# Patient Record
Sex: MALE | Race: WHITE | NOT HISPANIC OR LATINO | Employment: OTHER | URBAN - METROPOLITAN AREA
[De-identification: names, ages, dates, MRNs, and addresses within clinical notes are randomized per-mention and may not be internally consistent; named-entity substitution may affect disease eponyms.]

---

## 2018-08-20 ENCOUNTER — APPOINTMENT (OUTPATIENT)
Dept: CARDIOLOGY | Facility: HOSPITAL | Age: 63
End: 2018-08-20
Attending: INTERNAL MEDICINE

## 2018-08-20 ENCOUNTER — APPOINTMENT (OUTPATIENT)
Dept: GENERAL RADIOLOGY | Facility: HOSPITAL | Age: 63
End: 2018-08-20

## 2018-08-20 ENCOUNTER — HOSPITAL ENCOUNTER (INPATIENT)
Facility: HOSPITAL | Age: 63
LOS: 2 days | Discharge: HOME | End: 2018-08-22
Attending: EMERGENCY MEDICINE | Admitting: INTERNAL MEDICINE

## 2018-08-20 DIAGNOSIS — I21.19 ACUTE MI, INFERIOR WALL (HCC): Primary | ICD-10-CM

## 2018-08-20 LAB
ACT BLD: 334 SECONDS (ref 82–152)
ALBUMIN SERPL-MCNC: 4 G/DL (ref 3.5–5.2)
ALBUMIN/GLOB SERPL: 1.8 G/DL
ALP SERPL-CCNC: 58 U/L (ref 39–117)
ALT SERPL W P-5'-P-CCNC: 14 U/L (ref 1–41)
ANION GAP SERPL CALCULATED.3IONS-SCNC: 12.1 MMOL/L
AST SERPL-CCNC: 13 U/L (ref 1–40)
BASOPHILS # BLD AUTO: 0.02 10*3/MM3 (ref 0–0.2)
BASOPHILS NFR BLD AUTO: 0.2 % (ref 0–1.5)
BH CV ECHO MEAS - ACS: 1.9 CM
BH CV ECHO MEAS - AO MAX PG (FULL): 7 MMHG
BH CV ECHO MEAS - AO MAX PG: 15.2 MMHG
BH CV ECHO MEAS - AO MEAN PG (FULL): 4 MMHG
BH CV ECHO MEAS - AO MEAN PG: 8 MMHG
BH CV ECHO MEAS - AO ROOT AREA (BSA CORRECTED): 1.3
BH CV ECHO MEAS - AO ROOT AREA: 5.3 CM^2
BH CV ECHO MEAS - AO ROOT DIAM: 2.6 CM
BH CV ECHO MEAS - AO V2 MAX: 195 CM/SEC
BH CV ECHO MEAS - AO V2 MEAN: 129 CM/SEC
BH CV ECHO MEAS - AO V2 VTI: 42.6 CM
BH CV ECHO MEAS - AVA(I,A): 2.5 CM^2
BH CV ECHO MEAS - AVA(I,D): 2.5 CM^2
BH CV ECHO MEAS - AVA(V,A): 2.5 CM^2
BH CV ECHO MEAS - AVA(V,D): 2.5 CM^2
BH CV ECHO MEAS - BSA(HAYCOCK): 2.1 M^2
BH CV ECHO MEAS - BSA: 2.1 M^2
BH CV ECHO MEAS - BZI_BMI: 25.5 KILOGRAMS/M^2
BH CV ECHO MEAS - BZI_METRIC_HEIGHT: 182.9 CM
BH CV ECHO MEAS - BZI_METRIC_WEIGHT: 85.3 KG
BH CV ECHO MEAS - EDV(CUBED): 79.5 ML
BH CV ECHO MEAS - EDV(MOD-SP2): 76 ML
BH CV ECHO MEAS - EDV(MOD-SP4): 90 ML
BH CV ECHO MEAS - EDV(TEICH): 83.1 ML
BH CV ECHO MEAS - EF(CUBED): 69.3 %
BH CV ECHO MEAS - EF(MOD-BP): 70 %
BH CV ECHO MEAS - EF(MOD-SP2): 67.1 %
BH CV ECHO MEAS - EF(MOD-SP4): 70 %
BH CV ECHO MEAS - EF(TEICH): 61.2 %
BH CV ECHO MEAS - ESV(CUBED): 24.4 ML
BH CV ECHO MEAS - ESV(MOD-SP2): 25 ML
BH CV ECHO MEAS - ESV(MOD-SP4): 27 ML
BH CV ECHO MEAS - ESV(TEICH): 32.2 ML
BH CV ECHO MEAS - FS: 32.6 %
BH CV ECHO MEAS - IVS/LVPW: 1.1
BH CV ECHO MEAS - IVSD: 1.1 CM
BH CV ECHO MEAS - LAT PEAK E' VEL: 8.9 CM/SEC
BH CV ECHO MEAS - LV DIASTOLIC VOL/BSA (35-75): 43.4 ML/M^2
BH CV ECHO MEAS - LV MASS(C)D: 152.6 GRAMS
BH CV ECHO MEAS - LV MASS(C)DI: 73.5 GRAMS/M^2
BH CV ECHO MEAS - LV MAX PG: 8.2 MMHG
BH CV ECHO MEAS - LV MEAN PG: 4 MMHG
BH CV ECHO MEAS - LV SYSTOLIC VOL/BSA (12-30): 13 ML/M^2
BH CV ECHO MEAS - LV V1 MAX: 143 CM/SEC
BH CV ECHO MEAS - LV V1 MEAN: 86.1 CM/SEC
BH CV ECHO MEAS - LV V1 VTI: 30.2 CM
BH CV ECHO MEAS - LVIDD: 4.3 CM
BH CV ECHO MEAS - LVIDS: 2.9 CM
BH CV ECHO MEAS - LVLD AP2: 7.3 CM
BH CV ECHO MEAS - LVLD AP4: 8 CM
BH CV ECHO MEAS - LVLS AP2: 6.1 CM
BH CV ECHO MEAS - LVLS AP4: 6.3 CM
BH CV ECHO MEAS - LVOT AREA (M): 3.5 CM^2
BH CV ECHO MEAS - LVOT AREA: 3.5 CM^2
BH CV ECHO MEAS - LVOT DIAM: 2.1 CM
BH CV ECHO MEAS - LVPWD: 1 CM
BH CV ECHO MEAS - MED PEAK E' VEL: 9.8 CM/SEC
BH CV ECHO MEAS - MV A DUR: 0.18 SEC
BH CV ECHO MEAS - MV A MAX VEL: 102 CM/SEC
BH CV ECHO MEAS - MV DEC SLOPE: 386 CM/SEC^2
BH CV ECHO MEAS - MV DEC TIME: 0.18 SEC
BH CV ECHO MEAS - MV E MAX VEL: 94.6 CM/SEC
BH CV ECHO MEAS - MV E/A: 0.93
BH CV ECHO MEAS - MV MAX PG: 4.8 MMHG
BH CV ECHO MEAS - MV MEAN PG: 1 MMHG
BH CV ECHO MEAS - MV P1/2T MAX VEL: 115 CM/SEC
BH CV ECHO MEAS - MV P1/2T: 87.3 MSEC
BH CV ECHO MEAS - MV V2 MAX: 109 CM/SEC
BH CV ECHO MEAS - MV V2 MEAN: 50.3 CM/SEC
BH CV ECHO MEAS - MV V2 VTI: 41.7 CM
BH CV ECHO MEAS - MVA P1/2T LCG: 1.9 CM^2
BH CV ECHO MEAS - MVA(P1/2T): 2.5 CM^2
BH CV ECHO MEAS - MVA(VTI): 2.5 CM^2
BH CV ECHO MEAS - PA ACC TIME: 0.11 SEC
BH CV ECHO MEAS - PA MAX PG (FULL): 1.7 MMHG
BH CV ECHO MEAS - PA MAX PG: 3 MMHG
BH CV ECHO MEAS - PA PR(ACCEL): 30 MMHG
BH CV ECHO MEAS - PA V2 MAX: 86.9 CM/SEC
BH CV ECHO MEAS - PULM A REVS DUR: 0.16 SEC
BH CV ECHO MEAS - PULM A REVS VEL: 34.6 CM/SEC
BH CV ECHO MEAS - PULM DIAS VEL: 54.1 CM/SEC
BH CV ECHO MEAS - PULM S/D: 1.1
BH CV ECHO MEAS - PULM SYS VEL: 58.5 CM/SEC
BH CV ECHO MEAS - PVA(V,A): 3.2 CM^2
BH CV ECHO MEAS - PVA(V,D): 3.2 CM^2
BH CV ECHO MEAS - QP/QS: 0.75
BH CV ECHO MEAS - RAP SYSTOLE: 3 MMHG
BH CV ECHO MEAS - RV MAX PG: 1.3 MMHG
BH CV ECHO MEAS - RV MEAN PG: 1 MMHG
BH CV ECHO MEAS - RV V1 MAX: 56.9 CM/SEC
BH CV ECHO MEAS - RV V1 MEAN: 39.7 CM/SEC
BH CV ECHO MEAS - RV V1 VTI: 15.9 CM
BH CV ECHO MEAS - RVOT AREA: 4.9 CM^2
BH CV ECHO MEAS - RVOT DIAM: 2.5 CM
BH CV ECHO MEAS - RVSP: 24.3 MMHG
BH CV ECHO MEAS - SI(AO): 109 ML/M^2
BH CV ECHO MEAS - SI(CUBED): 26.6 ML/M^2
BH CV ECHO MEAS - SI(LVOT): 50.4 ML/M^2
BH CV ECHO MEAS - SI(MOD-SP2): 24.6 ML/M^2
BH CV ECHO MEAS - SI(MOD-SP4): 30.4 ML/M^2
BH CV ECHO MEAS - SI(TEICH): 24.5 ML/M^2
BH CV ECHO MEAS - SV(AO): 226.2 ML
BH CV ECHO MEAS - SV(CUBED): 55.1 ML
BH CV ECHO MEAS - SV(LVOT): 104.6 ML
BH CV ECHO MEAS - SV(MOD-SP2): 51 ML
BH CV ECHO MEAS - SV(MOD-SP4): 63 ML
BH CV ECHO MEAS - SV(RVOT): 78 ML
BH CV ECHO MEAS - SV(TEICH): 50.9 ML
BH CV ECHO MEAS - TAPSE (>1.6): 2.6 CM2
BH CV ECHO MEAS - TR MAX VEL: 231 CM/SEC
BH CV ECHO MEASUREMENTS AVERAGE E/E' RATIO: 10.12
BH CV XLRA - RV BASE: 3.8 CM
BH CV XLRA - RV LENGTH: 7 CM
BH CV XLRA - RV MID: 2.8 CM
BH CV XLRA - TDI S': 15.8 CM/SEC
BILIRUB SERPL-MCNC: 0.2 MG/DL (ref 0.1–1.2)
BUN BLD-MCNC: 15 MG/DL (ref 8–23)
BUN/CREAT SERPL: 15.3 (ref 7–25)
CALCIUM SPEC-SCNC: 8.7 MG/DL (ref 8.6–10.5)
CHLORIDE SERPL-SCNC: 105 MMOL/L (ref 98–107)
CO2 SERPL-SCNC: 24.9 MMOL/L (ref 22–29)
CREAT BLD-MCNC: 0.98 MG/DL (ref 0.76–1.27)
DEPRECATED RDW RBC AUTO: 47.7 FL (ref 37–54)
EOSINOPHIL # BLD AUTO: 0.17 10*3/MM3 (ref 0–0.7)
EOSINOPHIL NFR BLD AUTO: 1.7 % (ref 0.3–6.2)
ERYTHROCYTE [DISTWIDTH] IN BLOOD BY AUTOMATED COUNT: 14.1 % (ref 11.5–14.5)
GFR SERPL CREATININE-BSD FRML MDRD: 78 ML/MIN/1.73
GLOBULIN UR ELPH-MCNC: 2.2 GM/DL
GLUCOSE BLD-MCNC: 136 MG/DL (ref 65–99)
GLUCOSE BLDC GLUCOMTR-MCNC: 120 MG/DL (ref 70–130)
HCT VFR BLD AUTO: 49.6 % (ref 40.4–52.2)
HGB BLD-MCNC: 15.9 G/DL (ref 13.7–17.6)
HOLD SPECIMEN: NORMAL
HOLD SPECIMEN: NORMAL
IMM GRANULOCYTES # BLD: 0.03 10*3/MM3 (ref 0–0.03)
IMM GRANULOCYTES NFR BLD: 0.3 % (ref 0–0.5)
INR PPP: 1.05 (ref 0.9–1.1)
LEFT ATRIUM VOLUME INDEX: 31 ML/M2
LV EF 2D ECHO EST: 70 %
LYMPHOCYTES # BLD AUTO: 5.11 10*3/MM3 (ref 0.9–4.8)
LYMPHOCYTES NFR BLD AUTO: 49.9 % (ref 19.6–45.3)
MAGNESIUM SERPL-MCNC: 2.1 MG/DL (ref 1.6–2.4)
MAGNESIUM SERPL-MCNC: 2.2 MG/DL (ref 1.6–2.4)
MAXIMAL PREDICTED HEART RATE: 159 BPM
MCH RBC QN AUTO: 29.9 PG (ref 27–32.7)
MCHC RBC AUTO-ENTMCNC: 32.1 G/DL (ref 32.6–36.4)
MCV RBC AUTO: 93.4 FL (ref 79.8–96.2)
MONOCYTES # BLD AUTO: 0.71 10*3/MM3 (ref 0.2–1.2)
MONOCYTES NFR BLD AUTO: 6.9 % (ref 5–12)
NEUTROPHILS # BLD AUTO: 4.2 10*3/MM3 (ref 1.9–8.1)
NEUTROPHILS NFR BLD AUTO: 41 % (ref 42.7–76)
PLATELET # BLD AUTO: 198 10*3/MM3 (ref 140–500)
PMV BLD AUTO: 10.7 FL (ref 6–12)
POTASSIUM BLD-SCNC: 4.2 MMOL/L (ref 3.5–5.2)
POTASSIUM BLD-SCNC: 4.3 MMOL/L (ref 3.5–5.2)
PROT SERPL-MCNC: 6.2 G/DL (ref 6–8.5)
PROTHROMBIN TIME: 13.5 SECONDS (ref 11.7–14.2)
RBC # BLD AUTO: 5.31 10*6/MM3 (ref 4.6–6)
SODIUM BLD-SCNC: 142 MMOL/L (ref 136–145)
STRESS TARGET HR: 135 BPM
TROPONIN T SERPL-MCNC: 0.05 NG/ML (ref 0–0.03)
TROPONIN T SERPL-MCNC: 0.72 NG/ML (ref 0–0.03)
WBC NRBC COR # BLD: 10.24 10*3/MM3 (ref 4.5–10.7)
WHOLE BLOOD HOLD SPECIMEN: NORMAL
WHOLE BLOOD HOLD SPECIMEN: NORMAL

## 2018-08-20 PROCEDURE — 83735 ASSAY OF MAGNESIUM: CPT | Performed by: EMERGENCY MEDICINE

## 2018-08-20 PROCEDURE — 84484 ASSAY OF TROPONIN QUANT: CPT | Performed by: EMERGENCY MEDICINE

## 2018-08-20 PROCEDURE — 92941 PRQ TRLML REVSC TOT OCCL AMI: CPT | Performed by: INTERNAL MEDICINE

## 2018-08-20 PROCEDURE — 83735 ASSAY OF MAGNESIUM: CPT | Performed by: INTERNAL MEDICINE

## 2018-08-20 PROCEDURE — C1769 GUIDE WIRE: HCPCS | Performed by: INTERNAL MEDICINE

## 2018-08-20 PROCEDURE — C1757 CATH, THROMBECTOMY/EMBOLECT: HCPCS | Performed by: INTERNAL MEDICINE

## 2018-08-20 PROCEDURE — C1874 STENT, COATED/COV W/DEL SYS: HCPCS | Performed by: INTERNAL MEDICINE

## 2018-08-20 PROCEDURE — 25010000002 PHENYLEPHRINE PER 1 ML: Performed by: INTERNAL MEDICINE

## 2018-08-20 PROCEDURE — 99152 MOD SED SAME PHYS/QHP 5/>YRS: CPT | Performed by: INTERNAL MEDICINE

## 2018-08-20 PROCEDURE — 25010000002 HEPARIN (PORCINE) PER 1000 UNITS: Performed by: INTERNAL MEDICINE

## 2018-08-20 PROCEDURE — 02C03ZZ EXTIRPATION OF MATTER FROM CORONARY ARTERY, ONE ARTERY, PERCUTANEOUS APPROACH: ICD-10-PCS | Performed by: INTERNAL MEDICINE

## 2018-08-20 PROCEDURE — 93010 ELECTROCARDIOGRAM REPORT: CPT | Performed by: INTERNAL MEDICINE

## 2018-08-20 PROCEDURE — G0378 HOSPITAL OBSERVATION PER HR: HCPCS

## 2018-08-20 PROCEDURE — 027034Z DILATION OF CORONARY ARTERY, ONE ARTERY WITH DRUG-ELUTING INTRALUMINAL DEVICE, PERCUTANEOUS APPROACH: ICD-10-PCS | Performed by: INTERNAL MEDICINE

## 2018-08-20 PROCEDURE — 85025 COMPLETE CBC W/AUTO DIFF WBC: CPT | Performed by: EMERGENCY MEDICINE

## 2018-08-20 PROCEDURE — 99220 PR INITIAL OBSERVATION CARE/DAY 70 MINUTES: CPT | Performed by: INTERNAL MEDICINE

## 2018-08-20 PROCEDURE — 71045 X-RAY EXAM CHEST 1 VIEW: CPT

## 2018-08-20 PROCEDURE — 25010000002 MIDAZOLAM PER 1 MG: Performed by: INTERNAL MEDICINE

## 2018-08-20 PROCEDURE — 25010000002 MORPHINE PER 10 MG: Performed by: EMERGENCY MEDICINE

## 2018-08-20 PROCEDURE — C9606 PERC D-E COR REVASC W AMI S: HCPCS | Performed by: INTERNAL MEDICINE

## 2018-08-20 PROCEDURE — 4A023N7 MEASUREMENT OF CARDIAC SAMPLING AND PRESSURE, LEFT HEART, PERCUTANEOUS APPROACH: ICD-10-PCS | Performed by: INTERNAL MEDICINE

## 2018-08-20 PROCEDURE — 84484 ASSAY OF TROPONIN QUANT: CPT | Performed by: INTERNAL MEDICINE

## 2018-08-20 PROCEDURE — 93005 ELECTROCARDIOGRAM TRACING: CPT | Performed by: EMERGENCY MEDICINE

## 2018-08-20 PROCEDURE — 84132 ASSAY OF SERUM POTASSIUM: CPT | Performed by: INTERNAL MEDICINE

## 2018-08-20 PROCEDURE — 25010000002 BH (CUPID ONLY) ADENOSINE 6 MG/100ML MIXTURE: Performed by: INTERNAL MEDICINE

## 2018-08-20 PROCEDURE — 3E073PZ INTRODUCTION OF PLATELET INHIBITOR INTO CORONARY ARTERY, PERCUTANEOUS APPROACH: ICD-10-PCS | Performed by: INTERNAL MEDICINE

## 2018-08-20 PROCEDURE — 0 IOPAMIDOL PER 1 ML: Performed by: INTERNAL MEDICINE

## 2018-08-20 PROCEDURE — C1725 CATH, TRANSLUMIN NON-LASER: HCPCS | Performed by: INTERNAL MEDICINE

## 2018-08-20 PROCEDURE — 82962 GLUCOSE BLOOD TEST: CPT

## 2018-08-20 PROCEDURE — 93458 L HRT ARTERY/VENTRICLE ANGIO: CPT | Performed by: INTERNAL MEDICINE

## 2018-08-20 PROCEDURE — 25010000002 ONDANSETRON PER 1 MG: Performed by: INTERNAL MEDICINE

## 2018-08-20 PROCEDURE — 25010000002 FENTANYL CITRATE (PF) 100 MCG/2ML SOLUTION: Performed by: INTERNAL MEDICINE

## 2018-08-20 PROCEDURE — B2111ZZ FLUOROSCOPY OF MULTIPLE CORONARY ARTERIES USING LOW OSMOLAR CONTRAST: ICD-10-PCS | Performed by: INTERNAL MEDICINE

## 2018-08-20 PROCEDURE — 85347 COAGULATION TIME ACTIVATED: CPT

## 2018-08-20 PROCEDURE — 93005 ELECTROCARDIOGRAM TRACING: CPT | Performed by: INTERNAL MEDICINE

## 2018-08-20 PROCEDURE — 93306 TTE W/DOPPLER COMPLETE: CPT

## 2018-08-20 PROCEDURE — 80053 COMPREHEN METABOLIC PANEL: CPT | Performed by: EMERGENCY MEDICINE

## 2018-08-20 PROCEDURE — C1894 INTRO/SHEATH, NON-LASER: HCPCS | Performed by: INTERNAL MEDICINE

## 2018-08-20 PROCEDURE — 85610 PROTHROMBIN TIME: CPT | Performed by: EMERGENCY MEDICINE

## 2018-08-20 PROCEDURE — 99153 MOD SED SAME PHYS/QHP EA: CPT | Performed by: INTERNAL MEDICINE

## 2018-08-20 PROCEDURE — 99285 EMERGENCY DEPT VISIT HI MDM: CPT

## 2018-08-20 PROCEDURE — 93306 TTE W/DOPPLER COMPLETE: CPT | Performed by: INTERNAL MEDICINE

## 2018-08-20 PROCEDURE — 25010000002 HEPARIN (PORCINE) PER 1000 UNITS: Performed by: EMERGENCY MEDICINE

## 2018-08-20 PROCEDURE — 25010000002 ONDANSETRON PER 1 MG: Performed by: EMERGENCY MEDICINE

## 2018-08-20 PROCEDURE — B2151ZZ FLUOROSCOPY OF LEFT HEART USING LOW OSMOLAR CONTRAST: ICD-10-PCS | Performed by: INTERNAL MEDICINE

## 2018-08-20 PROCEDURE — C1887 CATHETER, GUIDING: HCPCS | Performed by: INTERNAL MEDICINE

## 2018-08-20 DEVICE — XIENCE SIERRA™ EVEROLIMUS ELUTING CORONARY STENT SYSTEM 3.00 MM X 38 MM / RAPID-EXCHANGE
Type: IMPLANTABLE DEVICE | Status: FUNCTIONAL
Brand: XIENCE SIERRA™

## 2018-08-20 RX ORDER — SODIUM CHLORIDE 9 MG/ML
50 INJECTION, SOLUTION INTRAVENOUS CONTINUOUS
Status: DISCONTINUED | OUTPATIENT
Start: 2018-08-20 | End: 2018-08-21

## 2018-08-20 RX ORDER — ONDANSETRON 2 MG/ML
4 INJECTION INTRAMUSCULAR; INTRAVENOUS EVERY 6 HOURS PRN
Status: DISCONTINUED | OUTPATIENT
Start: 2018-08-20 | End: 2018-08-22 | Stop reason: HOSPADM

## 2018-08-20 RX ORDER — ONDANSETRON 4 MG/1
4 TABLET, ORALLY DISINTEGRATING ORAL EVERY 6 HOURS PRN
Status: DISCONTINUED | OUTPATIENT
Start: 2018-08-20 | End: 2018-08-22 | Stop reason: HOSPADM

## 2018-08-20 RX ORDER — ACETAMINOPHEN 325 MG/1
650 TABLET ORAL EVERY 4 HOURS PRN
Status: DISCONTINUED | OUTPATIENT
Start: 2018-08-20 | End: 2018-08-22 | Stop reason: HOSPADM

## 2018-08-20 RX ORDER — CLOPIDOGREL BISULFATE 75 MG/1
600 TABLET ORAL ONCE
Status: COMPLETED | OUTPATIENT
Start: 2018-08-20 | End: 2018-08-20

## 2018-08-20 RX ORDER — TAMSULOSIN HYDROCHLORIDE 0.4 MG/1
1 CAPSULE ORAL NIGHTLY
COMMUNITY
End: 2020-10-08 | Stop reason: ALTCHOICE

## 2018-08-20 RX ORDER — MORPHINE SULFATE 2 MG/ML
1 INJECTION, SOLUTION INTRAMUSCULAR; INTRAVENOUS EVERY 4 HOURS PRN
Status: DISCONTINUED | OUTPATIENT
Start: 2018-08-20 | End: 2018-08-22 | Stop reason: HOSPADM

## 2018-08-20 RX ORDER — ONDANSETRON 2 MG/ML
4 INJECTION INTRAMUSCULAR; INTRAVENOUS ONCE
Status: COMPLETED | OUTPATIENT
Start: 2018-08-20 | End: 2018-08-20

## 2018-08-20 RX ORDER — HYDROCODONE BITARTRATE AND ACETAMINOPHEN 5; 325 MG/1; MG/1
1 TABLET ORAL EVERY 4 HOURS PRN
Status: DISCONTINUED | OUTPATIENT
Start: 2018-08-20 | End: 2018-08-22 | Stop reason: HOSPADM

## 2018-08-20 RX ORDER — ONDANSETRON 2 MG/ML
INJECTION INTRAMUSCULAR; INTRAVENOUS AS NEEDED
Status: DISCONTINUED | OUTPATIENT
Start: 2018-08-20 | End: 2018-08-20 | Stop reason: HOSPADM

## 2018-08-20 RX ORDER — TAMSULOSIN HYDROCHLORIDE 0.4 MG/1
0.4 CAPSULE ORAL NIGHTLY
Status: DISCONTINUED | OUTPATIENT
Start: 2018-08-20 | End: 2018-08-22 | Stop reason: HOSPADM

## 2018-08-20 RX ORDER — ASPIRIN 81 MG/1
81 TABLET, CHEWABLE ORAL DAILY
Status: DISCONTINUED | OUTPATIENT
Start: 2018-08-20 | End: 2018-08-22 | Stop reason: HOSPADM

## 2018-08-20 RX ORDER — SODIUM CHLORIDE 9 MG/ML
INJECTION, SOLUTION INTRAVENOUS CONTINUOUS PRN
Status: COMPLETED | OUTPATIENT
Start: 2018-08-20 | End: 2018-08-20

## 2018-08-20 RX ORDER — NALOXONE HCL 0.4 MG/ML
0.4 VIAL (ML) INJECTION
Status: DISCONTINUED | OUTPATIENT
Start: 2018-08-20 | End: 2018-08-22 | Stop reason: HOSPADM

## 2018-08-20 RX ORDER — ONDANSETRON 4 MG/1
4 TABLET, FILM COATED ORAL EVERY 6 HOURS PRN
Status: DISCONTINUED | OUTPATIENT
Start: 2018-08-20 | End: 2018-08-22 | Stop reason: HOSPADM

## 2018-08-20 RX ORDER — ATORVASTATIN CALCIUM 20 MG/1
40 TABLET, FILM COATED ORAL NIGHTLY
Status: DISCONTINUED | OUTPATIENT
Start: 2018-08-20 | End: 2018-08-22 | Stop reason: HOSPADM

## 2018-08-20 RX ORDER — MIDAZOLAM HYDROCHLORIDE 1 MG/ML
INJECTION INTRAMUSCULAR; INTRAVENOUS AS NEEDED
Status: DISCONTINUED | OUTPATIENT
Start: 2018-08-20 | End: 2018-08-20 | Stop reason: HOSPADM

## 2018-08-20 RX ORDER — NICOTINE 21 MG/24HR
1 PATCH, TRANSDERMAL 24 HOURS TRANSDERMAL
Status: DISCONTINUED | OUTPATIENT
Start: 2018-08-20 | End: 2018-08-22 | Stop reason: HOSPADM

## 2018-08-20 RX ORDER — FENTANYL CITRATE 50 UG/ML
INJECTION, SOLUTION INTRAMUSCULAR; INTRAVENOUS AS NEEDED
Status: DISCONTINUED | OUTPATIENT
Start: 2018-08-20 | End: 2018-08-20 | Stop reason: HOSPADM

## 2018-08-20 RX ORDER — HEPARIN SODIUM 1000 [USP'U]/ML
INJECTION, SOLUTION INTRAVENOUS; SUBCUTANEOUS AS NEEDED
Status: DISCONTINUED | OUTPATIENT
Start: 2018-08-20 | End: 2018-08-20 | Stop reason: HOSPADM

## 2018-08-20 RX ORDER — HEPARIN SODIUM 5000 [USP'U]/ML
4000 INJECTION, SOLUTION INTRAVENOUS; SUBCUTANEOUS ONCE
Status: COMPLETED | OUTPATIENT
Start: 2018-08-20 | End: 2018-08-20

## 2018-08-20 RX ORDER — PHENYLEPHRINE HCL IN 0.9% NACL 0.5 MG/5ML
SYRINGE (ML) INTRAVENOUS AS NEEDED
Status: DISCONTINUED | OUTPATIENT
Start: 2018-08-20 | End: 2018-08-20 | Stop reason: HOSPADM

## 2018-08-20 RX ORDER — TEMAZEPAM 15 MG/1
15 CAPSULE ORAL NIGHTLY PRN
Status: DISCONTINUED | OUTPATIENT
Start: 2018-08-20 | End: 2018-08-22 | Stop reason: HOSPADM

## 2018-08-20 RX ORDER — SODIUM CHLORIDE 0.9 % (FLUSH) 0.9 %
10 SYRINGE (ML) INJECTION AS NEEDED
Status: DISCONTINUED | OUTPATIENT
Start: 2018-08-20 | End: 2018-08-22 | Stop reason: HOSPADM

## 2018-08-20 RX ADMIN — MORPHINE SULFATE 4 MG: 4 INJECTION INTRAVENOUS at 06:04

## 2018-08-20 RX ADMIN — ONDANSETRON 4 MG: 2 INJECTION, SOLUTION INTRAMUSCULAR; INTRAVENOUS at 06:04

## 2018-08-20 RX ADMIN — CLOPIDOGREL BISULFATE 600 MG: 300 TABLET, FILM COATED ORAL at 05:54

## 2018-08-20 RX ADMIN — ATORVASTATIN CALCIUM 40 MG: 20 TABLET, FILM COATED ORAL at 20:24

## 2018-08-20 RX ADMIN — SODIUM CHLORIDE 50 ML/HR: 9 INJECTION, SOLUTION INTRAVENOUS at 15:35

## 2018-08-20 RX ADMIN — ACETAMINOPHEN 650 MG: 325 TABLET, FILM COATED ORAL at 20:23

## 2018-08-20 RX ADMIN — TICAGRELOR 90 MG: 90 TABLET ORAL at 20:26

## 2018-08-20 RX ADMIN — TAMSULOSIN HYDROCHLORIDE 0.4 MG: 0.4 CAPSULE ORAL at 20:24

## 2018-08-20 RX ADMIN — HEPARIN SODIUM 4000 UNITS: 5000 INJECTION, SOLUTION INTRAVENOUS; SUBCUTANEOUS at 06:02

## 2018-08-20 RX ADMIN — IOPAMIDOL 131 ML: 755 INJECTION, SOLUTION INTRAVENOUS at 07:00

## 2018-08-20 NOTE — PROGRESS NOTES
Clinical Pharmacy Services: Medication History    Flavio Frost is a 62 y.o. male presenting to [facility] for Acute MI, inferior wall (CMS/HCC) [I21.19]  Acute MI, inferior wall (CMS/HCC) [I21.19]    He  has a past medical history of Cancer (CMS/HCC); Coronary artery disease; and Enlarged prostate.    Allergies as of 08/20/2018 - Reviewed 08/20/2018   Allergen Reaction Noted   • Penicillins Hives 08/20/2018       Medication information was obtained from: pharmacy  Pharmacy and Phone Number: [facility] [phone]    Prior to Admission Medications       Prescriptions Last Dose Informant Patient Reported? Taking?    tamsulosin (FLOMAX) 0.4 MG capsule 24 hr capsule 8/19/2018  Yes Yes    Take 1 capsule by mouth Every Night.          This medication list is complete to the best of my knowledge as of 8/20/2018    Please call if questions.    Lesly Kowalski, PharmD, BCACP  8/20/2018 3:34 PM

## 2018-08-20 NOTE — H&P
Patient Name: Flavio Frost  Age/Sex: 62 y.o. male  : 1955  MRN: [id number]    Date of Admission: 2018  Date of Encounter Visit: 18  Encounter Provider: Giovanny Leyva MD  Place of Service: [facility] CARDIOLOGY      Referring Provider: No ref. provider found  Patient Care Team:  Lit Salgado MD as PCP - General (Family Medicine)    Subjective:     Admitted for: STEMI    Chief Complaint: Chest pain    History of Present Illness:  Flavio Frost is a 62 y.o. male with a history of hyperlipidemia, cancer, enlarged prostate, current smoker, no known cardiac history. He presented to the ED via EMS early this morning with c/o chest pain that started when he woke up to use the restroom.  Per his report the pain was severe in intensity, central without radiation.  It was associated with diaphoresis and mild shortness of breath.  It was still ongoing when he arrived to the catheterization lab.  His EKG showed inferior ST elevation and sinus bradycardia.  He underwent coronary angiography with an occluded mid RCA.  He underwent a drug-eluting stent placed from the proximal to mid RCA.  This was a 3.0 x 38 mm Xience Lilo stent.  This was postdilated to 3.5 mm with a noncompliant balloon in the proximal to mid segment.  He received multiple doses of intracoronary adenosine and nitroglycerin along with intracoronary Integrilin.  He tolerated this well.  He is noted to have an ischemic cardiomyopathy based on his ejection fraction on ventriculogram as well      Cardiac Testing:  Echo ordered     Past Medical History:  Past Medical History:   Diagnosis Date   • Cancer (CMS/HCC)     skin cancer   • Coronary artery disease    • Enlarged prostate        Past Surgical History:   Procedure Laterality Date   • CARDIAC CATHETERIZATION     • SKIN BIOPSY     • SKIN CANCER EXCISION      Basal cell carcinoma x 2, malignant melanoma x 1   • WRIST SURGERY Left  "2007       Home Medications:   Prescriptions Prior to Admission   Medication Sig Dispense Refill Last Dose   • tamsulosin (FLOMAX) 0.4 MG capsule 24 hr capsule Take 1 capsule by mouth Every Night.   8/19/2018 at Unknown time       Allergies:  Allergies   Allergen Reactions   • Penicillins Hives       Past Social History:  Social History     Social History   • Marital status: Unknown     Spouse name: N/A   • Number of children: N/A   • Years of education: N/A     Occupational History   • Not on file.     Social History Main Topics   • Smoking status: Current Every Day Smoker     Packs/day: 1.00     Types: Cigarettes   • Smokeless tobacco: Never Used   • Alcohol use Yes      Comment: \"a little, not much\"   • Drug use: No   • Sexual activity: Defer     Other Topics Concern   • Not on file     Social History Narrative   • No narrative on file        Past Family History:  History reviewed. No pertinent family history.      Review of Systems:  All systems reviewed. Pertinent positives identified in HPI. All other systems are negative.       Objective:     Objective:  Temp:  [97.7 °F (36.5 °C)-98.2 °F (36.8 °C)] 98.2 °F (36.8 °C)  Heart Rate:  [42-58] 49  Resp:  [16-18] 16  BP: ()/(37-98) 99/68    Intake/Output Summary (Last 24 hours) at 08/20/18 1515  Last data filed at 08/20/18 1200   Gross per 24 hour   Intake              631 ml   Output              300 ml   Net              331 ml     Body mass index is 25.5 kg/m².  1    08/20/18  0551 08/20/18  0813 08/20/18  1101   Weight: 84.8 kg (187 lb) 85.3 kg (188 lb) 85.3 kg (188 lb)           Physical Exam:   General Appearance Well developed, cooperative and well nourished and no acute distress   Head Normocephalic, without abnormality, atraumatic   Ears Ears appear intact with no abnormalities noted   Throat No oral lesions, no thrush, oral mucosa moist   Neck No adenopathy, supple, trachea midline, no thyromegaly, no carotid bruit, no JVD   Back No skin lesions, " erythema or scars, no tenderness to percussion or palptaion,range of motion is normal   Lungs Clear to auscultation,respirations regular, even and unlabored   Heart Regular rhythm and normal rate, normal S1 and S2, no murmur, no gallop, no rub, no click   Chest wall No abnormalities observed   Abdomen Normal bowel sounds, no masses, no hepatomegaly,    Extremities Moves all extremities well, no edema, no cyanosis, no redness   Pulses Pulses palpable and equal bilaterally. Normal radial, carotid, femoral, dorsalis pedis and posterior tibial pulses bilaterally. Normal abdominal aorta   Skin No bleeding, bruising or rash   Psyhiatric Alert and oriented x 3, normal mood and affect       Lab Review:       Results from last 7 days  Lab Units 08/20/18  1438 08/20/18  0554   SODIUM mmol/L  --  142   POTASSIUM mmol/L 4.3 4.2   CHLORIDE mmol/L  --  105   CO2 mmol/L  --  24.9   BUN mg/dL  --  15   CREATININE mg/dL  --  0.98   GLUCOSE mg/dL  --  136*   CALCIUM mg/dL  --  8.7         Results from last 7 days  Lab Units 08/20/18  0926 08/20/18  0554   TROPONIN T ng/mL 0.716* 0.048*       Results from last 7 days  Lab Units 08/20/18  0554   WBC 10*3/mm3 10.24   HEMOGLOBIN g/dL 15.9   HEMATOCRIT % 49.6   PLATELETS 10*3/mm3 198       Results from last 7 days  Lab Units 08/20/18  0554   INR  1.05           Results from last 7 days  Lab Units 08/20/18  1438   MAGNESIUM mg/dL 2.1                       Imaging:    Imaging Results (most recent)     Procedure Component Value Units Date/Time    XR Chest 1 View [[id number]] Collected:  08/20/18 0634     Updated:  08/20/18 0638    Narrative:       XR CHEST 1 VW-     Clinical: Acute STEMI protocol     COMPARISON: None     FINDINGS: Monitoring leads and pads superimposing chest. The lungs are  clear. Cardiac size upper limits of normal. Mediastinum and matt are  satisfactory in appearance. The remainder is unremarkable.     CONCLUSION: No acute cardiovascular or pulmonary process identified.      This report was finalized on 8/20/2018 6:35 AM by Dr. Oscar Kurtz M.D.             Results for orders placed during the hospital encounter of 08/20/18   Echocardiogram 2D complete    Narrative · Left ventricular systolic function is normal. Estimated EF = 70%.  · Mild tricuspid valve regurgitation is present.  · Estimated right ventricular systolic pressure from tricuspid   regurgitation is normal (<35 mmHg).          EKG:               I personally viewed and interpreted the patient's EKG/Telemetry data.    Assessment:   Assessment/Plan   1.  Inferior ST elevation MI  2.  Coronary artery disease  3.  Ischemic cardiomyopathy    -Patient is currently doing well.  Had mild hypotension during procedure however this has resolved.      -Recommend dual antiplatelet therapy with aspirin and ticagrelor  -Metoprolol tartrate with hold parameters  -Continue atorvastatin 40 mg by mouth daily at bedtime  -Transthoracic echocardiogram has been ordered  -Routine post MI ICU care    Thank you for allowing me to participate in the care of Flavio Frost. Feel free to contact me directly with any further questions or concerns.    Giovanny Leyva MD  [facility]  08/20/18  3:15 PM

## 2018-08-20 NOTE — ED PROVIDER NOTES
" EMERGENCY DEPARTMENT ENCOUNTER    CHIEF COMPLAINT  Chief Complaint: Chest pain  History given by: Pt  History limited by: Nothing  Room Number: 16/16  PMD: Lit Salgado MD    HPI:  Pt is a 62 y.o. male who presents complaining of chest pain that began 1 hour ago when the pt woke up to go to the bathroom, which the pt describes as \"feeling funny\". The pt states that he has had slightly elevated BP over the last few weeks but no h/o hypertension.    Began 1 hour ago. The pt has no cardiac history or cardiologist.     Duration:  1 hour  Onset: Sudden  Timing: Constant  Location: Chest  Radiation: None  Quality: \"pain\"  Intensity/Severity: Moderate  Progression: No change  Associated Symptoms: Unknown  Aggravating Factors: Unknown  Alleviating Factors: None  Previous Episodes: None    PAST MEDICAL HISTORY  Active Ambulatory Problems     Diagnosis Date Noted   • No Active Ambulatory Problems     Resolved Ambulatory Problems     Diagnosis Date Noted   • No Resolved Ambulatory Problems     Past Medical History:   Diagnosis Date   • Cancer (CMS/HCC)    • Enlarged prostate        PAST SURGICAL HISTORY  History reviewed. No pertinent surgical history.    FAMILY HISTORY  History reviewed. No pertinent family history.    SOCIAL HISTORY  Social History     Social History   • Marital status: Unknown     Spouse name: N/A   • Number of children: N/A   • Years of education: N/A     Occupational History   • Not on file.     Social History Main Topics   • Smoking status: Current Every Day Smoker     Packs/day: 1.00     Types: Cigarettes   • Smokeless tobacco: Never Used   • Alcohol use Not on file      Comment: socially   • Drug use: No   • Sexual activity: Not on file     Other Topics Concern   • Not on file     Social History Narrative   • No narrative on file       ALLERGIES  Penicillins    REVIEW OF SYSTEMS  Review of Systems   Constitutional: Negative for activity change, appetite change and fever.   HENT: Negative for " congestion and sore throat.    Eyes: Negative.    Respiratory: Negative for cough and shortness of breath.    Cardiovascular: Positive for chest pain. Negative for leg swelling.   Gastrointestinal: Negative for abdominal pain, diarrhea and vomiting.   Endocrine: Negative.    Genitourinary: Negative for decreased urine volume and dysuria.   Musculoskeletal: Negative for neck pain.   Skin: Negative for rash and wound.   Allergic/Immunologic: Negative.    Neurological: Negative for weakness, numbness and headaches.   Hematological: Negative.    Psychiatric/Behavioral: Negative.    All other systems reviewed and are negative.      PHYSICAL EXAM  ED Triage Vitals   Temp Pulse Resp BP SpO2   -- -- -- -- --      Temp src Heart Rate Source Patient Position BP Location FiO2 (%)   -- -- -- -- --       Physical Exam   Constitutional: He is oriented to person, place, and time. No distress.   HENT:   Head: Normocephalic and atraumatic.   Eyes: Pupils are equal, round, and reactive to light. EOM are normal.   Neck: Normal range of motion. Neck supple.   Cardiovascular: Regular rhythm and normal heart sounds.  Bradycardia present.    Pulmonary/Chest: Effort normal and breath sounds normal. No respiratory distress.   Abdominal: Soft. There is no tenderness. There is no rebound and no guarding.   Musculoskeletal: Normal range of motion. He exhibits no edema or deformity.   Neurological: He is alert and oriented to person, place, and time. He has normal sensation and normal strength.   Skin: Skin is warm and dry.   Psychiatric: Mood and affect normal.   Nursing note and vitals reviewed.      LAB RESULTS  Lab Results (last 24 hours)     Procedure Component Value Units Date/Time    CBC & Differential [[id number]] Collected:  08/20/18 0554    Specimen:  Blood Updated:  08/20/18 0603    Narrative:       The following orders were created for panel order CBC & Differential.  Procedure                               Abnormality         Status                      ---------                               -----------         ------                     CBC Auto Differential[[id number]]        Abnormal            Final result                 Please view results for these tests on the individual orders.    Troponin [[id number]] Collected:  08/20/18 0554    Specimen:  Blood Updated:  08/20/18 0558    Comprehensive Metabolic Panel [[id number]] Collected:  08/20/18 0554    Specimen:  Blood Updated:  08/20/18 0558    Magnesium [[id number]] Collected:  08/20/18 0554    Specimen:  Blood Updated:  08/20/18 0558    Protime-INR [[id number]] Collected:  08/20/18 0554    Specimen:  Blood Updated:  08/20/18 0558    CBC Auto Differential [[id number]]  (Abnormal) Collected:  08/20/18 0554    Specimen:  Blood Updated:  08/20/18 0603     WBC 10.24 10*3/mm3      RBC 5.31 10*6/mm3      Hemoglobin 15.9 g/dL      Hematocrit 49.6 %      MCV 93.4 fL      MCH 29.9 pg      MCHC 32.1 (L) g/dL      RDW 14.1 %      RDW-SD 47.7 fl      MPV 10.7 fL      Platelets 198 10*3/mm3      Neutrophil % 41.0 (L) %      Lymphocyte % 49.9 (H) %      Monocyte % 6.9 %      Eosinophil % 1.7 %      Basophil % 0.2 %      Immature Grans % 0.3 %      Neutrophils, Absolute 4.20 10*3/mm3      Lymphocytes, Absolute 5.11 (H) 10*3/mm3      Monocytes, Absolute 0.71 10*3/mm3      Eosinophils, Absolute 0.17 10*3/mm3      Basophils, Absolute 0.02 10*3/mm3      Immature Grans, Absolute 0.03 10*3/mm3           I ordered the above labs and reviewed the results    RADIOLOGY  XR Chest 1 View    (Results Pending)        I ordered the above noted radiological studies. Interpreted by radiologist. Reviewed by me in PACS.       PROCEDURES  Critical Care  Performed by: GRANT PALMA  Authorized by: GRANT PALMA     Critical care provider statement:     Critical care time (minutes):  35    Critical care time was exclusive of:  Separately billable procedures and treating other patients    Critical care was necessary to treat or prevent  imminent or life-threatening deterioration of the following conditions:  Cardiac failure    Critical care was time spent personally by me on the following activities:  Blood draw for specimens, development of treatment plan with patient or surrogate, discussions with consultants, evaluation of patient's response to treatment, examination of patient, interpretation of cardiac output measurements, obtaining history from patient or surrogate, review of old charts, re-evaluation of patient's condition, pulse oximetry, ordering and review of radiographic studies and ordering and review of laboratory studies    I assumed direction of critical care for this patient from another provider in my specialty: no              EKG by EMS at 0528         Rhythm/Rate: Sinus jaime, 42  ST and T waves: ST elevation in 2, 3, aVF, reciprocal depression in 1 and 2     Interpreted Contemporaneously by me, independently viewed  No prior      EKG           EKG time: 0550  Rhythm/Rate: Jaime, 48  P waves and ID: Normal  QRS, axis: Normal   ST and T waves: ST elevation in 2, 3, and aVF with reciprocal changes in aVL and v2     Interpreted Contemporaneously by me, independently viewed  Similar to EKG by EMS which shows MI      PROGRESS AND CONSULTS     0531 Called team C.    0532 Discussed the pt with Dr. Leyva (cardio) who agrees to see the pt.     0548 Discussed the pt's EKG which shows that the pt is having a heart attack. Discussed that the pt will go to cath lab. The pt agrees with the plan and all questions were addressed.     0551 Ordered troponin, CMP, magnesium, protime-INR, CBC, CXR, and EKG for further evaluation. Ordered plavix and heparin. Ordered morphine and Zofran for pain and nausea.     0605 Rechecked the pt, who is feeling ok. The pt is about to receive pain medication.     0607 Pt is going to cath lab.     MEDICAL DECISION MAKING  Results were reviewed/discussed with the patient and they were also made aware of online  access. Pt also made aware that some labs, such as cultures, will not be resulted during ER visit and follow up with PMD is necessary.     MDM  Number of Diagnoses or Management Options  Acute MI, inferior wall (CMS/HCC):      Amount and/or Complexity of Data Reviewed  Clinical lab tests: ordered and reviewed  Tests in the radiology section of CPT®: ordered and reviewed  Tests in the medicine section of CPT®: ordered and reviewed (See EKG)  Decide to obtain previous medical records or to obtain history from someone other than the patient: yes  Review and summarize past medical records: yes  Discuss the patient with other providers: yes (Dr. Leyva (cardio))    Patient Progress  Patient progress: stable         DIAGNOSIS  Final diagnoses:   Acute MI, inferior wall (CMS/HCC)       DISPOSITION  ADMISSION    Discussed treatment plan and reason for admission with pt/family and admitting physician.  Pt/family voiced understanding of the plan for admission for further testing/treatment as needed.     Latest Documented Vital Signs:  As of 6:08 AM  BP- 91/76 HR- (!) 42 Temp- 97.7 °F (36.5 °C) (Oral) O2 sat- 99%    --  Documentation assistance provided by aniya Tinoco for Dr. Ku.  Information recorded by the scribstacie was done at my direction and has been verified and validated by me.     Kayleen Tinoco  08/20/18 0609       Kendell Ku MD  08/20/18 0615

## 2018-08-20 NOTE — PLAN OF CARE
Problem: Patient Care Overview  Goal: Plan of Care Review  Outcome: Ongoing (interventions implemented as appropriate)   08/20/18 5992   Coping/Psychosocial   Plan of Care Reviewed With patient   Plan of Care Review   Progress improving   OTHER   Outcome Summary Pt remains in CCU. No complaints of CP or SOA this shift. Pt did develop small hematoma, area marked and now soft. HR remains in the 40's-50's, MD aware. BP 90's-100's/50's-60's. VSS. Will continue to monitor.        Problem: Fall Risk (Adult)  Goal: Absence of Fall  Outcome: Ongoing (interventions implemented as appropriate)      Problem: Cardiac: ACS (Acute Coronary Syndrome) (Adult)  Goal: Signs and Symptoms of Listed Potential Problems Will be Absent, Minimized or Managed (Cardiac: ACS)  Outcome: Ongoing (interventions implemented as appropriate)

## 2018-08-20 NOTE — CONSULTS
Spiritual Care:  Advance Care planning information provided.  Pt will communicate with nurse when AD is completed and he needs a notary.   Jaylen Galindo

## 2018-08-20 NOTE — CONSULTS
"Met with patient and his daughters, discussed benefits of cardiac rehab. Provided phase II information packet, which includes; general information about cardiac rehab, [facility] Cardiac Re[facility] Programs handout and [employer] [facility] letter article entitled “Cardiac Rehab is often the Best Medicine for Recovery\", stresses the importance of cardiac rehab after a heart event.   I provided the contact information for cardiac rehab here at [facility] along with a list of other facilities in the area with  and their contact information  and encouraged him to call when discharged.   Verbalized understanding  and expressed interest in attending the program after he spoke with his cardiologist .      "

## 2018-08-21 LAB
ANION GAP SERPL CALCULATED.3IONS-SCNC: 11 MMOL/L
BUN BLD-MCNC: 10 MG/DL (ref 8–23)
BUN/CREAT SERPL: 13 (ref 7–25)
CALCIUM SPEC-SCNC: 7.9 MG/DL (ref 8.6–10.5)
CHLORIDE SERPL-SCNC: 108 MMOL/L (ref 98–107)
CHOLEST SERPL-MCNC: 124 MG/DL (ref 0–200)
CO2 SERPL-SCNC: 23 MMOL/L (ref 22–29)
CREAT BLD-MCNC: 0.77 MG/DL (ref 0.76–1.27)
DEPRECATED RDW RBC AUTO: 51.3 FL (ref 37–54)
ERYTHROCYTE [DISTWIDTH] IN BLOOD BY AUTOMATED COUNT: 14.5 % (ref 11.5–14.5)
GFR SERPL CREATININE-BSD FRML MDRD: 103 ML/MIN/1.73
GLUCOSE BLD-MCNC: 95 MG/DL (ref 65–99)
GLUCOSE BLDC GLUCOMTR-MCNC: 82 MG/DL (ref 70–130)
HBA1C MFR BLD: 5.2 % (ref 4.8–5.6)
HCT VFR BLD AUTO: 42.7 % (ref 40.4–52.2)
HDLC SERPL-MCNC: 25 MG/DL (ref 40–60)
HGB BLD-MCNC: 13.1 G/DL (ref 13.7–17.6)
LDLC SERPL CALC-MCNC: 77 MG/DL (ref 0–100)
LDLC/HDLC SERPL: 3.06 {RATIO}
MCH RBC QN AUTO: 29.5 PG (ref 27–32.7)
MCHC RBC AUTO-ENTMCNC: 30.7 G/DL (ref 32.6–36.4)
MCV RBC AUTO: 96.2 FL (ref 79.8–96.2)
PLATELET # BLD AUTO: 149 10*3/MM3 (ref 140–500)
PMV BLD AUTO: 10.6 FL (ref 6–12)
POTASSIUM BLD-SCNC: 4.4 MMOL/L (ref 3.5–5.2)
RBC # BLD AUTO: 4.44 10*6/MM3 (ref 4.6–6)
SODIUM BLD-SCNC: 142 MMOL/L (ref 136–145)
TRIGL SERPL-MCNC: 112 MG/DL (ref 0–150)
VLDLC SERPL-MCNC: 22.4 MG/DL (ref 5–40)
WBC NRBC COR # BLD: 8.58 10*3/MM3 (ref 4.5–10.7)

## 2018-08-21 PROCEDURE — 85027 COMPLETE CBC AUTOMATED: CPT | Performed by: INTERNAL MEDICINE

## 2018-08-21 PROCEDURE — 83036 HEMOGLOBIN GLYCOSYLATED A1C: CPT | Performed by: INTERNAL MEDICINE

## 2018-08-21 PROCEDURE — 80061 LIPID PANEL: CPT | Performed by: INTERNAL MEDICINE

## 2018-08-21 PROCEDURE — 93010 ELECTROCARDIOGRAM REPORT: CPT | Performed by: INTERNAL MEDICINE

## 2018-08-21 PROCEDURE — 99225 PR SBSQ OBSERVATION CARE/DAY 25 MINUTES: CPT | Performed by: INTERNAL MEDICINE

## 2018-08-21 PROCEDURE — 80048 BASIC METABOLIC PNL TOTAL CA: CPT | Performed by: INTERNAL MEDICINE

## 2018-08-21 PROCEDURE — 93005 ELECTROCARDIOGRAM TRACING: CPT | Performed by: INTERNAL MEDICINE

## 2018-08-21 PROCEDURE — 82962 GLUCOSE BLOOD TEST: CPT

## 2018-08-21 RX ADMIN — ATORVASTATIN CALCIUM 40 MG: 20 TABLET, FILM COATED ORAL at 20:16

## 2018-08-21 RX ADMIN — TICAGRELOR 90 MG: 90 TABLET ORAL at 08:33

## 2018-08-21 RX ADMIN — ASPIRIN 81 MG: 81 TABLET, CHEWABLE ORAL at 08:33

## 2018-08-21 RX ADMIN — TAMSULOSIN HYDROCHLORIDE 0.4 MG: 0.4 CAPSULE ORAL at 20:16

## 2018-08-21 RX ADMIN — TICAGRELOR 90 MG: 90 TABLET ORAL at 20:16

## 2018-08-21 NOTE — CONSULTS
"Adult Nutrition  Assessment/PES    Patient Name:  Flavio Frost  YOB: 1955  MRN: [id number]  Admit Date:  8/20/2018    Assessment Date:  8/21/2018    Comments:  Nutrition screen completed.          Reason for Assessment     Row Name 08/21/18 1049          Reason for Assessment    Reason For Assessment diagnosis/disease state     Diagnosis cardiac disease   STEMI, CAD, ischemic cardiomyopathy, tobacco abuse               Anthropometrics     Row Name 08/21/18 1050          Anthropometrics    Height 182.9 cm (72.01\")     Weight 85.3 kg (188 lb)        Ideal Body Weight (IBW)    Ideal Body Weight (IBW) (kg) 82.09     % Ideal Body Weight 103.88        Body Mass Index (BMI)    BMI (kg/m2) 25.54     BMI Assessment BMI 25-29.9: overweight        IBW Adjustment, Para/Tetraplegia    5% Adjustment, Para (IBW) 77.99     10% Adjustment, Para (IBW) 73.88     10% Adjustment, Tetra (IBW) 73.88     15% Adjustment, Tetra (IBW) 69.78             Labs/Tests/Procedures/Meds     Row Name 08/21/18 1050          Labs/Procedures/Meds    Lab Results Reviewed reviewed        Diagnostic Tests/Procedures    Diagnostic Test/Procedure Reviewed reviewed        Medications    Pertinent Medications Reviewed reviewed     Pertinent Medications Comments lipitor             Physical Findings     Row Name 08/21/18 1051          Physical Findings    Skin --   intact             Estimated/Assessed Needs     Row Name 08/21/18 1050          Calculation Measurements    Height 182.9 cm (72.01\")             Nutrition Prescription Ordered     Row Name 08/21/18 1052          Nutrition Prescription PO    Current PO Diet Regular     Common Modifiers Cardiac;Consistent Carbohydrate             Evaluation of Received Nutrient/Fluid Intake     Row Name 08/21/18 1050          Calculation Measurements    Height 182.9 cm (72.01\")             Evaluation of Prescribed Nutrient/Fluid Intake     Row Name 08/21/18 1050          Calculation Measurements    " "Height 182.9 cm (72.01\")           Problem/Interventions:        Problem 1     Row Name 08/21/18 1052          Nutrition Diagnoses Problem 1    Problem 1 Nutrition Appropriate for Condition at this Time     Etiology (related to) Medical Diagnosis     Cardiac CAD;Cardiomyopathy;MI;Hypertriglyceridemia                     Intervention Goal     Row Name 08/21/18 1052          Intervention Goal    General Maintain nutrition     PO Tolerate PO     Weight Maintain weight             Nutrition Intervention     Row Name 08/21/18 1052          Nutrition Intervention    RD/Tech Action Follow Tx progress;Care plan reviewd               Education/Evaluation     Row Name 08/21/18 1052          Education    Education Will Instruct as appropriate        Monitor/Evaluation    Monitor Per protocol         Electronically signed by:  Debbie Jj RD  08/21/18 10:53 AM  "

## 2018-08-21 NOTE — PROGRESS NOTES
Discharge Planning Assessment  [facility]     Patient Name: Flavio Frost  MRN: [id number]  Today's Date: 8/21/2018    Admit Date: 8/20/2018          Discharge Needs Assessment     Row Name 08/21/18 9679       Living Environment    Lives With alone    Current Living Arrangements home/apartment/condo    Primary Care Provided by self    Provides Primary Care For no one    Family Caregiver if Needed child(nitza), adult    Family Caregiver Names Lizzie or Debbie    Quality of Family Relationships supportive    Able to Return to Prior Arrangements yes       Resource/Environmental Concerns    Resource/Environmental Concerns none    Transportation Concerns car, none       Transition Planning    Patient/Family Anticipates Transition to home    Patient/Family Anticipated Services at Transition other (see comments)   cardiac rehab    Transportation Anticipated family or friend will provide       Discharge Needs Assessment    Readmission Within the Last 30 Days no previous admission in last 30 days    Concerns to be Addressed denies needs/concerns at this time    Equipment Currently Used at Home none    Anticipated Changes Related to Illness none    Equipment Needed After Discharge none    Offered/Gave Vendor List yes    Current Discharge Risk dependent with mobility/activities of daily living            Discharge Plan     Row Name 08/21/18 7893       Plan    Plan Home with family support.  Will fill prescriptions at [facility] retail pharmacy prior to discharge.    Patient/Family in Agreement with Plan yes    Plan Comments CCP spoke with pt at bedside.  Introduced self and explained role.  CCP contact information provided.  Face sheet and pharmacy verified.  Pt was independent at home and uses no DME.  He states that he has no discharge needs at this time.  Pt has been applied for Medicaid.  Pt has his paperwork from [employer] including his Medicaid ID number, [employer] savings card and instructions.  [facility] also provided a [employer]  Savings card for pt's use.  Discussed pt's discharge medications.  CCP suggested filling them at the  retail pharmacy prior to his leaving in order to assure no problems getting his Brilinta.  Pt is in agreement.  CCP will follow up in the AM for possible dc tomorrow.  CCP placed a call to the pharmacy to verify that they do have the Brilinta in stock.           Destination     No service coordination in this encounter.      Durable Medical Equipment     No service coordination in this encounter.      Dialysis/Infusion     No service coordination in this encounter.      Home Medical Care     No service coordination in this encounter.      Social Care     No service coordination in this encounter.                Demographic Summary     Row Name 08/21/18 1644       General Information    Admission Type inpatient    Arrived From home    Referral Source admission list    Reason for Consult discharge planning    Preferred Language English     Used During This Interaction no            Functional Status     Row Name 08/21/18 1644       Functional Status    Usual Activity Tolerance good    Current Activity Tolerance good       Functional Status, IADL    Medications independent    Meal Preparation independent    Housekeeping independent    Laundry independent    Shopping independent       Mental Status    General Appearance WDL WDL       Mental Status Summary    Recent Changes in Mental Status/Cognitive Functioning no changes       Employment/    Employment Status self-employed            Psychosocial    No documentation.           Abuse/Neglect    No documentation.           Legal    No documentation.           Substance Abuse    No documentation.           Patient Forms    No documentation.         Marva Hills RN

## 2018-08-21 NOTE — PROGRESS NOTES
"Patient Care Team:  Lit Salgado MD as PCP - General (Family Medicine)    Chief Complaint: Follow-up inferior ST elevation MI    Interval History:    Patient is doing very well today.  Mild chest discomfort still.  ST segments have resolved    Objective   Vital Signs  Temp:  [97.9 °F (36.6 °C)-98.8 °F (37.1 °C)] 98.2 °F (36.8 °C)  Heart Rate:  [43-58] 45  BP: ()/(37-98) 107/65    Intake/Output Summary (Last 24 hours) at 08/21/18 0839  Last data filed at 08/21/18 0600   Gross per 24 hour   Intake           457.89 ml   Output             2075 ml   Net         -1617.11 ml     Flowsheet Rows      First Filed Value   Admission Height  182.9 cm (72\") Documented at 08/20/2018 0551   Admission Weight  84.8 kg (187 lb) Documented at 08/20/2018 0551          General Appearance:    Alert, cooperative, in no acute distress   Head:    Normocephalic, without obvious abnormality, atraumatic       Neck:   No adenopathy, supple, no thyromegaly, no carotid bruit, no    JVD   Lungs:     Clear to auscultation bilaterally, no wheezes, rales, or     rhonchi    Heart:    Normal rate, regular rhythm,  No murmur, rub, or gallop   Chest Wall:    No abnormalities observed   Abdomen:     Normal bowel sounds, soft, non-tender, non-distended,            no rebound tenderness   Extremities:   No cyanosis, clubbing, or edema   Pulses:   Pulses palpable and equal bilaterally   Skin:   No bleeding or rash       Neurologic:   Cranial nerves 2 - 12 grossly intact, sensation intact             aspirin 81 mg Oral Daily   atorvastatin 40 mg Oral Nightly   eptifibatide 180 mcg/kg Intravenous Once   iopamidol 131 mL Intravenous Once in imaging   nicotine 1 patch Transdermal Q24H   tamsulosin 0.4 mg Oral Nightly   ticagrelor 90 mg Oral BID         sodium chloride 50 mL/hr Last Rate: 50 mL/hr (08/20/18 1535)       Results Review:      Results from last 7 days  Lab Units 08/21/18  0546   SODIUM mmol/L 142   POTASSIUM mmol/L 4.4   CHLORIDE " mmol/L 108*   CO2 mmol/L 23.0   BUN mg/dL 10   CREATININE mg/dL 0.77   GLUCOSE mg/dL 95   CALCIUM mg/dL 7.9*       Results from last 7 days  Lab Units 08/20/18  0926 08/20/18  0554   TROPONIN T ng/mL 0.716* 0.048*       Results from last 7 days  Lab Units 08/21/18  0546   WBC 10*3/mm3 8.58   HEMOGLOBIN g/dL 13.1*   HEMATOCRIT % 42.7   PLATELETS 10*3/mm3 149       Results from last 7 days  Lab Units 08/20/18  0554   INR  1.05       Results from last 7 days  Lab Units 08/21/18  0546   CHOLESTEROL mg/dL 124       Results from last 7 days  Lab Units 08/20/18  1438   MAGNESIUM mg/dL 2.1       Results from last 7 days  Lab Units 08/21/18  0546   CHOLESTEROL mg/dL 124   TRIGLYCERIDES mg/dL 112   HDL CHOL mg/dL 25*   LDL CHOL mg/dL 77     @LABRCNT(bnp)@  I reviewed the patient's new clinical results.  I personally viewed and interpreted the patient's EKG/Telemetry data        Assessment/Plan   1.  Inferior ST elevation MI: s/p PCI to the RCA  2.  Coronary artery disease  3.  Ischemic cardiomyopathy:  left ventricular ejection fraction now normal.  4.  Tobacco abuse    -Recommend continuing dual antiplatelet therapy with aspirin and ticagrelor  -Stop metoprolol tartrate.  Patient's resting heart rate less than 50 bpm preclude use of this medication  -Tobacco cessation has been discussed  -I will plan on transferring him to the floor today.  I would love to have a low-dose of lisinopril if possible, however I will monitor his blood pressure today.

## 2018-08-21 NOTE — PLAN OF CARE
Problem: Patient Care Overview  Goal: Plan of Care Review  Outcome: Ongoing (interventions implemented as appropriate)   08/20/18 1907 08/21/18 0400 08/21/18 0757   Coping/Psychosocial   Plan of Care Reviewed With --  patient --    Plan of Care Review   Progress improving --  --    OTHER   Outcome Summary --  --  pt VSS, HR continues to be 40's-50's, complaints of a headache with relief from tylenol        Problem: Fall Risk (Adult)  Goal: Identify Related Risk Factors and Signs and Symptoms  Outcome: Outcome(s) achieved Date Met: 08/21/18    Goal: Absence of Fall  Outcome: Ongoing (interventions implemented as appropriate)      Problem: Cardiac: ACS (Acute Coronary Syndrome) (Adult)  Goal: Signs and Symptoms of Listed Potential Problems Will be Absent, Minimized or Managed (Cardiac: ACS)  Outcome: Ongoing (interventions implemented as appropriate)      Problem: Pain, Acute (Adult)  Goal: Identify Related Risk Factors and Signs and Symptoms  Outcome: Outcome(s) achieved Date Met: 08/21/18    Goal: Acceptable Pain Control/Comfort Level  Outcome: Ongoing (interventions implemented as appropriate)

## 2018-08-21 NOTE — PAYOR COMM NOTE
"UR CONTACT:   JEFF                 P: [phone]  F: [phone]    MEDICAID ID # [id number]    FACILITY NPI:  [id number]  FACILITY TAX ID:  [id number]        Flavio Frost  (62 y.o. Male)     Date of Birth Social Security Number Address Home Phone MRN    1955  [address]  [place] [address] [phone] [id number]    [employer] Marital Status          Unknown Unknown       Admission Date Admission Type Admitting Provider Attending Provider Department, Room/Bed    18 Emergency Giovanny Leyva MD Semder, Christopher A, MD [facility] CORONARY CARE, 332/    Discharge Date Discharge Disposition Discharge Destination                       Attending Provider:  Giovanny Leyva MD    Allergies:  Penicillins    Isolation:  None   Infection:  None   Code Status:  CPR    Ht:  182.9 cm (72.01\")   Wt:  85.3 kg (188 lb)    Admission Cmt:  None   Principal Problem:  None                Active Insurance as of 2018     Patient has no active insurance coverage on file for 2018.          Emergency Contacts      (Rel.) Home Phone Work Phone Mobile Phone    Debbie Frost (Daughter) [phone] -- --    Lizzie Frost (Daughter) [phone] -- --               History & Physical      Giovanny Leyva MD at 2018  6:50 AM                   Patient Name: Flavio Frost  Age/Sex: 62 y.o. male  : 1955  MRN: [id number]    Date of Admission: 2018  Date of Encounter Visit: 18  Encounter Provider: Giovanny Leyva MD  Place of Service: [facility] CARDIOLOGY      Referring Provider: No ref. provider found  Patient Care Team:  Lit Salgado MD as PCP - General (Family Medicine)    Subjective:     Admitted for: STEMI    Chief Complaint: Chest pain    History of Present Illness:  Flavio Frost is a 62 y.o. male with a history of hyperlipidemia, cancer, enlarged prostate, current smoker, " no known cardiac history. He presented to the ED via EMS early this morning with c/o chest pain that started when he woke up to use the restroom.  Per his report the pain was severe in intensity, central without radiation.  It was associated with diaphoresis and mild shortness of breath.  It was still ongoing when he arrived to the catheterization lab.  His EKG showed inferior ST elevation and sinus bradycardia.  He underwent coronary angiography with an occluded mid RCA.  He underwent a drug-eluting stent placed from the proximal to mid RCA.  This was a 3.0 x 38 mm Xience Lilo stent.  This was postdilated to 3.5 mm with a noncompliant balloon in the proximal to mid segment.  He received multiple doses of intracoronary adenosine and nitroglycerin along with intracoronary Integrilin.  He tolerated this well.  He is noted to have an ischemic cardiomyopathy based on his ejection fraction on ventriculogram as well      Cardiac Testing:  Echo ordered     Past Medical History:  Past Medical History:   Diagnosis Date   • Cancer (CMS/HCC)     skin cancer   • Coronary artery disease    • Enlarged prostate        Past Surgical History:   Procedure Laterality Date   • CARDIAC CATHETERIZATION     • SKIN BIOPSY     • SKIN CANCER EXCISION  2001    Basal cell carcinoma x 2, malignant melanoma x 1   • WRIST SURGERY Left 2007       Home Medications:   Prescriptions Prior to Admission   Medication Sig Dispense Refill Last Dose   • tamsulosin (FLOMAX) 0.4 MG capsule 24 hr capsule Take 1 capsule by mouth Every Night.   8/19/2018 at Unknown time       Allergies:  Allergies   Allergen Reactions   • Penicillins Hives       Past Social History:  Social History     Social History   • Marital status: Unknown     Spouse name: N/A   • Number of children: N/A   • Years of education: N/A     Occupational History   • Not on file.     Social History Main Topics   • Smoking status: Current Every Day Smoker     Packs/day: 1.00     Types:  "Cigarettes   • Smokeless tobacco: Never Used   • Alcohol use Yes      Comment: \"a little, not much\"   • Drug use: No   • Sexual activity: Defer     Other Topics Concern   • Not on file     Social History Narrative   • No narrative on file        Past Family History:  History reviewed. No pertinent family history.      Review of Systems:  All systems reviewed. Pertinent positives identified in HPI. All other systems are negative.       Objective:     Objective:  Temp:  [97.7 °F (36.5 °C)-98.2 °F (36.8 °C)] 98.2 °F (36.8 °C)  Heart Rate:  [42-58] 49  Resp:  [16-18] 16  BP: ()/(37-98) 99/68    Intake/Output Summary (Last 24 hours) at 08/20/18 1515  Last data filed at 08/20/18 1200   Gross per 24 hour   Intake              631 ml   Output              300 ml   Net              331 ml     Body mass index is 25.5 kg/m².  1    08/20/18  0551 08/20/18  0813 08/20/18  1101   Weight: 84.8 kg (187 lb) 85.3 kg (188 lb) 85.3 kg (188 lb)           Physical Exam:   General Appearance Well developed, cooperative and well nourished and no acute distress   Head Normocephalic, without abnormality, atraumatic   Ears Ears appear intact with no abnormalities noted   Throat No oral lesions, no thrush, oral mucosa moist   Neck No adenopathy, supple, trachea midline, no thyromegaly, no carotid bruit, no JVD   Back No skin lesions, erythema or scars, no tenderness to percussion or palptaion,range of motion is normal   Lungs Clear to auscultation,respirations regular, even and unlabored   Heart Regular rhythm and normal rate, normal S1 and S2, no murmur, no gallop, no rub, no click   Chest wall No abnormalities observed   Abdomen Normal bowel sounds, no masses, no hepatomegaly,    Extremities Moves all extremities well, no edema, no cyanosis, no redness   Pulses Pulses palpable and equal bilaterally. Normal radial, carotid, femoral, dorsalis pedis and posterior tibial pulses bilaterally. Normal abdominal aorta   Skin No bleeding, " bruising or rash   Psyhiatric Alert and oriented x 3, normal mood and affect       Lab Review:       Results from last 7 days  Lab Units 08/20/18  1438 08/20/18  0554   SODIUM mmol/L  --  142   POTASSIUM mmol/L 4.3 4.2   CHLORIDE mmol/L  --  105   CO2 mmol/L  --  24.9   BUN mg/dL  --  15   CREATININE mg/dL  --  0.98   GLUCOSE mg/dL  --  136*   CALCIUM mg/dL  --  8.7         Results from last 7 days  Lab Units 08/20/18  0926 08/20/18  0554   TROPONIN T ng/mL 0.716* 0.048*       Results from last 7 days  Lab Units 08/20/18  0554   WBC 10*3/mm3 10.24   HEMOGLOBIN g/dL 15.9   HEMATOCRIT % 49.6   PLATELETS 10*3/mm3 198       Results from last 7 days  Lab Units 08/20/18  0554   INR  1.05           Results from last 7 days  Lab Units 08/20/18  1438   MAGNESIUM mg/dL 2.1                       Imaging:    Imaging Results (most recent)     Procedure Component Value Units Date/Time    XR Chest 1 View [[id number]] Collected:  08/20/18 0634     Updated:  08/20/18 0638    Narrative:       XR CHEST 1 VW-     Clinical: Acute STEMI protocol     COMPARISON: None     FINDINGS: Monitoring leads and pads superimposing chest. The lungs are  clear. Cardiac size upper limits of normal. Mediastinum and matt are  satisfactory in appearance. The remainder is unremarkable.     CONCLUSION: No acute cardiovascular or pulmonary process identified.     This report was finalized on 8/20/2018 6:35 AM by Dr. Oscar Kurtz M.D.             Results for orders placed during the hospital encounter of 08/20/18   Echocardiogram 2D complete    Narrative · Left ventricular systolic function is normal. Estimated EF = 70%.  · Mild tricuspid valve regurgitation is present.  · Estimated right ventricular systolic pressure from tricuspid   regurgitation is normal (<35 mmHg).          EKG:               I personally viewed and interpreted the patient's EKG/Telemetry data.    Assessment:   Assessment/Plan   1.  Inferior ST elevation MI  2.  Coronary artery  "disease  3.  Ischemic cardiomyopathy    -Patient is currently doing well.  Had mild hypotension during procedure however this has resolved.      -Recommend dual antiplatelet therapy with aspirin and ticagrelor  -Metoprolol tartrate with hold parameters  -Continue atorvastatin 40 mg by mouth daily at bedtime  -Transthoracic echocardiogram has been ordered  -Routine post MI ICU care    Thank you for allowing me to participate in the care of Flavio Frost. Feel free to contact me directly with any further questions or concerns.    Giovanny Leyva MD  [facility]  08/20/18  3:15 PM    Electronically signed by Giovanny Leyva MD at 8/20/2018  3:17 PM          Emergency Department Notes      Kendell Ku MD at 8/20/2018  5:49 AM      Procedure Orders:    1. Critical Care [[id number]] ordered by Kendell Ku MD at 08/20/18 0559                 EMERGENCY DEPARTMENT ENCOUNTER    CHIEF COMPLAINT  Chief Complaint: Chest pain  History given by: Pt  History limited by: Nothing  Room Number: 16/16  PMD: Lit Salgado MD    HPI:  Pt is a 62 y.o. male who presents complaining of chest pain that began 1 hour ago when the pt woke up to go to the bathroom, which the pt describes as \"feeling funny\". The pt states that he has had slightly elevated BP over the last few weeks but no h/o hypertension.    Began 1 hour ago. The pt has no cardiac history or cardiologist.     Duration:  1 hour  Onset: Sudden  Timing: Constant  Location: Chest  Radiation: None  Quality: \"pain\"  Intensity/Severity: Moderate  Progression: No change  Associated Symptoms: Unknown  Aggravating Factors: Unknown  Alleviating Factors: None  Previous Episodes: None    PAST MEDICAL HISTORY  Active Ambulatory Problems     Diagnosis Date Noted   • No Active Ambulatory Problems     Resolved Ambulatory Problems     Diagnosis Date Noted   • No Resolved Ambulatory Problems     Past Medical History:   Diagnosis Date   • Cancer (CMS/HCC)  "   • Enlarged prostate        PAST SURGICAL HISTORY  History reviewed. No pertinent surgical history.    FAMILY HISTORY  History reviewed. No pertinent family history.    SOCIAL HISTORY  Social History     Social History   • Marital status: Unknown     Spouse name: N/A   • Number of children: N/A   • Years of education: N/A     Occupational History   • Not on file.     Social History Main Topics   • Smoking status: Current Every Day Smoker     Packs/day: 1.00     Types: Cigarettes   • Smokeless tobacco: Never Used   • Alcohol use Not on file      Comment: socially   • Drug use: No   • Sexual activity: Not on file     Other Topics Concern   • Not on file     Social History Narrative   • No narrative on file       ALLERGIES  Penicillins    REVIEW OF SYSTEMS  Review of Systems   Constitutional: Negative for activity change, appetite change and fever.   HENT: Negative for congestion and sore throat.    Eyes: Negative.    Respiratory: Negative for cough and shortness of breath.    Cardiovascular: Positive for chest pain. Negative for leg swelling.   Gastrointestinal: Negative for abdominal pain, diarrhea and vomiting.   Endocrine: Negative.    Genitourinary: Negative for decreased urine volume and dysuria.   Musculoskeletal: Negative for neck pain.   Skin: Negative for rash and wound.   Allergic/Immunologic: Negative.    Neurological: Negative for weakness, numbness and headaches.   Hematological: Negative.    Psychiatric/Behavioral: Negative.    All other systems reviewed and are negative.      PHYSICAL EXAM  ED Triage Vitals   Temp Pulse Resp BP SpO2   -- -- -- -- --      Temp src Heart Rate Source Patient Position BP Location FiO2 (%)   -- -- -- -- --       Physical Exam   Constitutional: He is oriented to person, place, and time. No distress.   HENT:   Head: Normocephalic and atraumatic.   Eyes: Pupils are equal, round, and reactive to light. EOM are normal.   Neck: Normal range of motion. Neck supple.    Cardiovascular: Regular rhythm and normal heart sounds.  Bradycardia present.    Pulmonary/Chest: Effort normal and breath sounds normal. No respiratory distress.   Abdominal: Soft. There is no tenderness. There is no rebound and no guarding.   Musculoskeletal: Normal range of motion. He exhibits no edema or deformity.   Neurological: He is alert and oriented to person, place, and time. He has normal sensation and normal strength.   Skin: Skin is warm and dry.   Psychiatric: Mood and affect normal.   Nursing note and vitals reviewed.      LAB RESULTS  Lab Results (last 24 hours)     Procedure Component Value Units Date/Time    CBC & Differential [[id number]] Collected:  08/20/18 0554    Specimen:  Blood Updated:  08/20/18 0603    Narrative:       The following orders were created for panel order CBC & Differential.  Procedure                               Abnormality         Status                     ---------                               -----------         ------                     CBC Auto Differential[[id number]]        Abnormal            Final result                 Please view results for these tests on the individual orders.    Troponin [[id number]] Collected:  08/20/18 0554    Specimen:  Blood Updated:  08/20/18 0558    Comprehensive Metabolic Panel [[id number]] Collected:  08/20/18 0554    Specimen:  Blood Updated:  08/20/18 0558    Magnesium [[id number]] Collected:  08/20/18 0554    Specimen:  Blood Updated:  08/20/18 0558    Protime-INR [[id number]] Collected:  08/20/18 0554    Specimen:  Blood Updated:  08/20/18 0558    CBC Auto Differential [[id number]]  (Abnormal) Collected:  08/20/18 0554    Specimen:  Blood Updated:  08/20/18 0603     WBC 10.24 10*3/mm3      RBC 5.31 10*6/mm3      Hemoglobin 15.9 g/dL      Hematocrit 49.6 %      MCV 93.4 fL      MCH 29.9 pg      MCHC 32.1 (L) g/dL      RDW 14.1 %      RDW-SD 47.7 fl      MPV 10.7 fL      Platelets 198 10*3/mm3      Neutrophil % 41.0 (L) %       Lymphocyte % 49.9 (H) %      Monocyte % 6.9 %      Eosinophil % 1.7 %      Basophil % 0.2 %      Immature Grans % 0.3 %      Neutrophils, Absolute 4.20 10*3/mm3      Lymphocytes, Absolute 5.11 (H) 10*3/mm3      Monocytes, Absolute 0.71 10*3/mm3      Eosinophils, Absolute 0.17 10*3/mm3      Basophils, Absolute 0.02 10*3/mm3      Immature Grans, Absolute 0.03 10*3/mm3           I ordered the above labs and reviewed the results    RADIOLOGY  XR Chest 1 View    (Results Pending)        I ordered the above noted radiological studies. Interpreted by radiologist. Reviewed by me in PACS.       PROCEDURES  Critical Care  Performed by: GRANT PALMA  Authorized by: GRANT PALMA     Critical care provider statement:     Critical care time (minutes):  35    Critical care time was exclusive of:  Separately billable procedures and treating other patients    Critical care was necessary to treat or prevent imminent or life-threatening deterioration of the following conditions:  Cardiac failure    Critical care was time spent personally by me on the following activities:  Blood draw for specimens, development of treatment plan with patient or surrogate, discussions with consultants, evaluation of patient's response to treatment, examination of patient, interpretation of cardiac output measurements, obtaining history from patient or surrogate, review of old charts, re-evaluation of patient's condition, pulse oximetry, ordering and review of radiographic studies and ordering and review of laboratory studies    I assumed direction of critical care for this patient from another provider in my specialty: no              EKG by EMS at 0528         Rhythm/Rate: Sinus jaime, 42  ST and T waves: ST elevation in 2, 3, aVF, reciprocal depression in 1 and 2     Interpreted Contemporaneously by me, independently viewed  No prior      EKG           EKG time: 0550  Rhythm/Rate: Jaime, 48  P waves and GA: Normal  QRS, axis: Normal   ST and T waves:  ST elevation in 2, 3, and aVF with reciprocal changes in aVL and v2     Interpreted Contemporaneously by me, independently viewed  Similar to EKG by EMS which shows MI      PROGRESS AND CONSULTS     0531 Called team C.    0532 Discussed the pt with Dr. Leyva (cardio) who agrees to see the pt.     0548 Discussed the pt's EKG which shows that the pt is having a heart attack. Discussed that the pt will go to cath lab. The pt agrees with the plan and all questions were addressed.     0551 Ordered troponin, CMP, magnesium, protime-INR, CBC, CXR, and EKG for further evaluation. Ordered plavix and heparin. Ordered morphine and Zofran for pain and nausea.     0605 Rechecked the pt, who is feeling ok. The pt is about to receive pain medication.     0607 Pt is going to cath lab.     MEDICAL DECISION MAKING  Results were reviewed/discussed with the patient and they were also made aware of online access. Pt also made aware that some labs, such as cultures, will not be resulted during ER visit and follow up with PMD is necessary.     MDM  Number of Diagnoses or Management Options  Acute MI, inferior wall (CMS/HCC):      Amount and/or Complexity of Data Reviewed  Clinical lab tests: ordered and reviewed  Tests in the radiology section of CPT®:  ordered and reviewed  Tests in the medicine section of CPT®:  ordered and reviewed (See EKG)  Decide to obtain previous medical records or to obtain history from someone other than the patient: yes  Review and summarize past medical records: yes  Discuss the patient with other providers: yes (Dr. Leyva (cardio))    Patient Progress  Patient progress: stable         DIAGNOSIS  Final diagnoses:   Acute MI, inferior wall (CMS/HCC)       DISPOSITION  ADMISSION    Discussed treatment plan and reason for admission with pt/family and admitting physician.  Pt/family voiced understanding of the plan for admission for further testing/treatment as needed.     Latest Documented Vital Signs:  As of  6:08 AM  BP- 91/76 HR- (!) 42 Temp- 97.7 °F (36.5 °C) (Oral) O2 sat- 99%    --  Documentation assistance provided by aniya Tinoco for Dr. Ku.  Information recorded by the scribe was done at my direction and has been verified and validated by me.     Kayleen Tinoco  08/20/18 0609       Kendell Ku MD  08/20/18 0615      Electronically signed by Kendell Ku MD at 8/20/2018  6:15 AM     Steph Juan RN at 8/20/2018  6:05 AM        Pt here for c/o left chestpain that started 1 hr pta - pt given 324 mg aspirin per EMS prior to arrival.       Steph Juan RN  08/20/18 0606      Electronically signed by Steph Juan RN at 8/20/2018  6:06 AM       Lines, Drains & Airways    Active LDAs     Name:   Placement date:   Placement time:   Site:   Days:    Peripheral IV 08/20/18 0551 Right Hand  08/20/18    0551    Hand    1    Peripheral IV 08/20/18 0552 Right Antecubital  08/20/18    0552    Antecubital    1         Inactive LDAs     Name:   Placement date:   Placement time:   Removal date:   Removal time:   Site:   Days:    [REMOVED] Peripheral IV 08/20/18 0600 Left Antecubital  08/20/18    0600    08/20/18    1643    Antecubital    less than 1    [REMOVED] Arterial Sheath 6 Fr. Right Radial  08/20/18    0619    08/20/18    0655    Radial    less than 1                Hospital Medications (all)       Dose Frequency Start End    acetaminophen (TYLENOL) tablet 650 mg 650 mg Every 4 Hours PRN 8/20/2018     Sig - Route: Take 2 tablets by mouth Every 4 (Four) Hours As Needed for Mild Pain  or Fever (temperature greater than 101F). - Oral    aspirin chewable tablet 81 mg 81 mg Daily 8/20/2018     Sig - Route: Chew 1 tablet Daily. - Oral    atorvastatin (LIPITOR) tablet 40 mg 40 mg Nightly 8/20/2018     Sig - Route: Take 2 tablets by mouth Every Night. - Oral    clopidogrel (PLAVIX) tablet 600 mg 600 mg Once 8/20/2018 8/20/2018    Sig - Route: Take 8 tablets by mouth 1 (One) Time. - Oral     "eptifibatide (INTEGRILIN) bolus from bottle 15,260 mcg 180 mcg/kg × 84.8 kg Once 8/20/2018     Sig - Route: Infuse 7.63 mL into a venous catheter 1 (One) Time. - Intravenous    heparin (porcine) 5000 UNIT/ML injection 4,000 Units 4,000 Units Once 8/20/2018 8/20/2018    Sig - Route: Infuse 0.8 mL into a venous catheter 1 (One) Time. - Intravenous    HYDROcodone-acetaminophen (NORCO) 5-325 MG per tablet 1 tablet 1 tablet Every 4 Hours PRN 8/20/2018     Sig - Route: Take 1 tablet by mouth Every 4 (Four) Hours As Needed for Moderate Pain . - Oral    iopamidol (ISOVUE-370) 76 % injection 131 mL 131 mL Once in Imaging 8/20/2018 8/20/2018    Sig - Route: Infuse 131 mL into a venous catheter Once. - Intravenous    morphine injection 1 mg 1 mg Every 4 Hours PRN 8/20/2018 8/30/2018    Sig - Route: Infuse 0.5 mL into a venous catheter Every 4 (Four) Hours As Needed for Severe Pain . - Intravenous    Linked Group 1:  \"And\" Linked Group Details        morphine injection 4 mg 4 mg Once 8/20/2018 8/20/2018    Sig - Route: Infuse 1 mL into a venous catheter 1 (One) Time. - Intravenous    naloxone (NARCAN) injection 0.4 mg 0.4 mg Every 5 Minutes PRN 8/20/2018     Sig - Route: Infuse 1 mL into a venous catheter Every 5 (Five) Minutes As Needed for Respiratory Depression. - Intravenous    Linked Group 1:  \"And\" Linked Group Details        nicotine (NICODERM CQ) 14 MG/24HR patch 1 patch 1 patch Every 24 Hours Scheduled 8/20/2018     Sig - Route: Place 1 patch on the skin as directed by provider Daily. - Transdermal    ondansetron (ZOFRAN) injection 4 mg 4 mg Once 8/20/2018 8/20/2018    Sig - Route: Infuse 2 mL into a venous catheter 1 (One) Time. - Intravenous    ondansetron (ZOFRAN) injection 4 mg 4 mg Every 6 Hours PRN 8/20/2018     Sig - Route: Infuse 2 mL into a venous catheter Every 6 (Six) Hours As Needed for Nausea or Vomiting. - Intravenous    Linked Group 2:  \"Or\" Linked Group Details        ondansetron (ZOFRAN) tablet 4 mg 4 " "mg Every 6 Hours PRN 8/20/2018     Sig - Route: Take 1 tablet by mouth Every 6 (Six) Hours As Needed for Nausea or Vomiting. - Oral    Linked Group 2:  \"Or\" Linked Group Details        ondansetron ODT (ZOFRAN-ODT) disintegrating tablet 4 mg 4 mg Every 6 Hours PRN 8/20/2018     Sig - Route: Take 1 tablet by mouth Every 6 (Six) Hours As Needed for Nausea or Vomiting. - Oral    Linked Group 2:  \"Or\" Linked Group Details        sodium chloride 0.9 % flush 10 mL 10 mL As Needed 8/20/2018     Sig - Route: Infuse 10 mL into a venous catheter As Needed for Line Care. - Intravenous    sodium chloride 0.9 % infusion  Continuous PRN 8/20/2018 8/20/2018    Sig: Continuous As Needed.    tamsulosin (FLOMAX) 24 hr capsule 0.4 mg 0.4 mg Nightly 8/20/2018     Sig - Route: Take 1 capsule by mouth Every Night. - Oral    temazepam (RESTORIL) capsule 15 mg 15 mg Nightly PRN 8/20/2018     Sig - Route: Take 1 capsule by mouth At Night As Needed for Sleep. - Oral    ticagrelor (BRILINTA) tablet 90 mg 90 mg 2 Times Daily 8/20/2018     Sig - Route: Take 1 tablet by mouth 2 (Two) Times a Day. - Oral          Physician Progress Notes       Giovanny Leyva MD at 8/21/2018  8:39 AM          Patient Care Team:  Lit Salgado MD as PCP - General (Family Medicine)    Chief Complaint: Follow-up inferior ST elevation MI    Interval History:    Patient is doing very well today.  Mild chest discomfort still.  ST segments have resolved    Objective   Vital Signs  Temp:  [97.9 °F (36.6 °C)-98.8 °F (37.1 °C)] 98.2 °F (36.8 °C)  Heart Rate:  [43-58] 45  BP: ()/(37-98) 107/65    Intake/Output Summary (Last 24 hours) at 08/21/18 0839  Last data filed at 08/21/18 0600   Gross per 24 hour   Intake           457.89 ml   Output             2075 ml   Net         -1617.11 ml     Flowsheet Rows      First Filed Value   Admission Height  182.9 cm (72\") Documented at 08/20/2018 0551   Admission Weight  84.8 kg (187 lb) Documented at 08/20/2018 " 0551          General Appearance:    Alert, cooperative, in no acute distress   Head:    Normocephalic, without obvious abnormality, atraumatic       Neck:   No adenopathy, supple, no thyromegaly, no carotid bruit, no    JVD   Lungs:     Clear to auscultation bilaterally, no wheezes, rales, or     rhonchi    Heart:    Normal rate, regular rhythm,  No murmur, rub, or gallop   Chest Wall:    No abnormalities observed   Abdomen:     Normal bowel sounds, soft, non-tender, non-distended,            no rebound tenderness   Extremities:   No cyanosis, clubbing, or edema   Pulses:   Pulses palpable and equal bilaterally   Skin:   No bleeding or rash       Neurologic:   Cranial nerves 2 - 12 grossly intact, sensation intact             aspirin 81 mg Oral Daily   atorvastatin 40 mg Oral Nightly   eptifibatide 180 mcg/kg Intravenous Once   iopamidol 131 mL Intravenous Once in imaging   nicotine 1 patch Transdermal Q24H   tamsulosin 0.4 mg Oral Nightly   ticagrelor 90 mg Oral BID         sodium chloride 50 mL/hr Last Rate: 50 mL/hr (08/20/18 1535)       Results Review:      Results from last 7 days  Lab Units 08/21/18  0546   SODIUM mmol/L 142   POTASSIUM mmol/L 4.4   CHLORIDE mmol/L 108*   CO2 mmol/L 23.0   BUN mg/dL 10   CREATININE mg/dL 0.77   GLUCOSE mg/dL 95   CALCIUM mg/dL 7.9*       Results from last 7 days  Lab Units 08/20/18  0926 08/20/18  0554   TROPONIN T ng/mL 0.716* 0.048*       Results from last 7 days  Lab Units 08/21/18  0546   WBC 10*3/mm3 8.58   HEMOGLOBIN g/dL 13.1*   HEMATOCRIT % 42.7   PLATELETS 10*3/mm3 149       Results from last 7 days  Lab Units 08/20/18  0554   INR  1.05       Results from last 7 days  Lab Units 08/21/18  0546   CHOLESTEROL mg/dL 124       Results from last 7 days  Lab Units 08/20/18  1438   MAGNESIUM mg/dL 2.1       Results from last 7 days  Lab Units 08/21/18  0546   CHOLESTEROL mg/dL 124   TRIGLYCERIDES mg/dL 112   HDL CHOL mg/dL 25*   LDL CHOL mg/dL 77     @LABRCNT(bnp)@  I  reviewed the patient's new clinical results.  I personally viewed and interpreted the patient's EKG/Telemetry data        Assessment/Plan   1.  Inferior ST elevation MI: s/p PCI to the RCA  2.  Coronary artery disease  3.  Ischemic cardiomyopathy:  left ventricular ejection fraction now normal.  4.  Tobacco abuse    -Recommend continuing dual antiplatelet therapy with aspirin and ticagrelor  -Stop metoprolol tartrate.  Patient's resting heart rate less than 50 bpm preclude use of this medication  -Tobacco cessation has been discussed  -I will plan on transferring him to the floor today.  I would love to have a low-dose of lisinopril if possible, however I will monitor his blood pressure today.        Electronically signed by Giovanny Leyva MD at 8/21/2018  8:42 AM          Consult Notes       Charlie Galindo at 8/20/2018 10:30 AM        Spiritual Care:  Advance Care planning information provided.  Pt will communicate with nurse when AD is completed and he needs a notary.  erica Galindo    Electronically signed by Charlie Galindo at 8/20/2018 10:31 AM

## 2018-08-22 VITALS
DIASTOLIC BLOOD PRESSURE: 66 MMHG | TEMPERATURE: 98.6 F | HEART RATE: 56 BPM | SYSTOLIC BLOOD PRESSURE: 114 MMHG | RESPIRATION RATE: 14 BRPM | HEIGHT: 72 IN | WEIGHT: 188 LBS | OXYGEN SATURATION: 99 % | BODY MASS INDEX: 25.47 KG/M2

## 2018-08-22 PROCEDURE — 99239 HOSP IP/OBS DSCHRG MGMT >30: CPT | Performed by: INTERNAL MEDICINE

## 2018-08-22 RX ORDER — ATORVASTATIN CALCIUM 40 MG/1
40 TABLET, FILM COATED ORAL NIGHTLY
Qty: 30 TABLET | Refills: 6 | Status: SHIPPED | OUTPATIENT
Start: 2018-08-22 | End: 2019-03-21 | Stop reason: SDUPTHER

## 2018-08-22 RX ORDER — NICOTINE 21 MG/24HR
1 PATCH, TRANSDERMAL 24 HOURS TRANSDERMAL EVERY 24 HOURS
Qty: 14 PATCH | Refills: 0 | Status: SHIPPED | OUTPATIENT
Start: 2018-08-22 | End: 2018-09-14

## 2018-08-22 RX ORDER — CLOPIDOGREL BISULFATE 75 MG/1
75 TABLET ORAL DAILY
Qty: 30 TABLET | Refills: 11 | Status: SHIPPED | OUTPATIENT
Start: 2018-08-22 | End: 2019-08-19 | Stop reason: SDUPTHER

## 2018-08-22 RX ORDER — ASPIRIN 81 MG/1
81 TABLET, CHEWABLE ORAL DAILY
Qty: 30 TABLET | Refills: 11 | Status: SHIPPED | OUTPATIENT
Start: 2018-08-23

## 2018-08-22 RX ADMIN — ASPIRIN 81 MG: 81 TABLET, CHEWABLE ORAL at 09:08

## 2018-08-22 RX ADMIN — TICAGRELOR 90 MG: 90 TABLET ORAL at 09:08

## 2018-08-22 NOTE — ACP (ADVANCE CARE PLANNING)
responded to consult in [facility] for Advance Care Planning. Patient stated that he received the form on Monday and is taking it home with him to consult with his children.  Patient is being discharged and then will be admitted again in a week. Patient will bring document with him next week to have notarized.

## 2018-08-22 NOTE — PLAN OF CARE
Problem: Patient Care Overview  Goal: Plan of Care Review  Outcome: Ongoing (interventions implemented as appropriate)   08/20/18 1907 08/21/18 2000 08/22/18 0430   Coping/Psychosocial   Plan of Care Reviewed With --  patient --    Plan of Care Review   Progress improving --  --    OTHER   Outcome Summary --  --  pt VSS, slept through most of the night, no complaints, educated on tobacco cessation, plan of care discussed, CTM       Problem: Fall Risk (Adult)  Goal: Absence of Fall  Outcome: Ongoing (interventions implemented as appropriate)      Problem: Cardiac: ACS (Acute Coronary Syndrome) (Adult)  Goal: Signs and Symptoms of Listed Potential Problems Will be Absent, Minimized or Managed (Cardiac: ACS)  Outcome: Ongoing (interventions implemented as appropriate)      Problem: Pain, Acute (Adult)  Goal: Acceptable Pain Control/Comfort Level  Outcome: Ongoing (interventions implemented as appropriate)

## 2018-08-22 NOTE — PROGRESS NOTES
Case Management Discharge Note    Final Note: Discharged home with family by private vehicle.    Destination     No service has been selected for the patient.      Durable Medical Equipment     No service has been selected for the patient.      Dialysis/Infusion     No service has been selected for the patient.      Home Medical Care     No service has been selected for the patient.      Social Care     No service has been selected for the patient.        Other: Other (private vehicle)    Final Discharge Disposition Code: 01 - home or self-care

## 2018-08-22 NOTE — DISCHARGE INSTR - ACTIVITY
Increase activity as tolerated. Do not lift greater than 10lbs with right arm for 1 week. Participate in cardiac rehab per doctor instructions.

## 2018-08-22 NOTE — PROGRESS NOTES
Continued Stay Note  [facility]     Patient Name: Flavio Frost  MRN: [id number]  Today's Date: 8/22/2018    Admit Date: 8/20/2018          Discharge Plan     Row Name 08/22/18 0924       Plan    Plan Home with family support    Plan Comments CCP spoke with pt's RN.  Dr. Leyva has decided that pt will go home on Plavix instead of Brilinta.  She has spoken with pt and he would like to get his prescriptions filled at [facility] in [facility].  She has changed pt's pharmacy and pt is aware.              Discharge Codes    No documentation.           Marva Hills RN

## 2018-08-22 NOTE — DISCHARGE SUMMARY
Date of Discharge:  8/22/2018  Date of Admit: 8/20/2018    Discharge Diagnosis:Active Problems:    Acute MI, inferior wall (CMS/HCC)  Coronary artery disease  Tobacco abuse  Bradycardia    Hospital Course:   62 y.o. male with a history of hyperlipidemia, cancer, enlarged prostate, current smoker, no known cardiac history. He presented to the ED via EMS early this morning with c/o chest pain that started when he woke up to use the restroom.  Per his report the pain was severe in intensity, central without radiation.  It was associated with diaphoresis and mild shortness of breath.  It was still ongoing when he arrived to the catheterization lab.  His EKG showed inferior ST elevation and sinus bradycardia.  He underwent coronary angiography with an occluded mid RCA.  He underwent a drug-eluting stent placed from the proximal to mid RCA.  This was a 3.0 x 38 mm Xience Lilo stent.  This was postdilated to 3.5 mm with a noncompliant balloon in the proximal to mid segment.  He received multiple doses of intracoronary adenosine and nitroglycerin along with intracoronary Integrilin.  He tolerated this well.   His ejection fraction normalized on the echocardiogram.    Unfortunately, he was unable to tolerate low-dose metoprolol tartrate secondary to significant sinus bradycardia and lightheadedness.  He also had mild hypotension which precluded starting lisinopril therapy.  He does not have insurance and was moved over from Brilinta to Plavix therapy.      Procedures Performed  Procedure(s):  Left Heart Cath  Stent CARRIE coronary  Percutaneous Coronary Intervention  Coronary angiography  Left ventriculography  Percutaneous Mechanical Thrombectomy      Conclusions:   1. Left main: Normal  2. LAD: Discrete 70-80% apical stenosis  3. LCX: 60% proximal OM1 stenosis  4. RCA: Acutely occluded in the midsegment  5.  Moderately depressed left ventricular systolic function.  Ejection fraction 30%.  Basal to mid inferior wall  "akinesis.  Mild anterolateral hypokinesis  6.  Successful PCI and aspiration thrombectomy of the RCA.  3.0 x 38 mm Xience Lilo drug-eluting stent deployed across the proximal to mid segment and postdilated in the proximal to mid segment with a 3.5 x 20 mm noncompliant trek balloon to high pressure    Consults     Date and Time Order Name Status Description    8/20/2018 0551 Consult Interventional Cardiology and Notify Cath Lab Completed           Pertinent Test Results:   TTE 8/20/18  · Left ventricular systolic function is normal. Estimated EF = 70%.  · Mild tricuspid valve regurgitation is present.  · Estimated right ventricular systolic pressure from tricuspid regurgitation is normal (<35 mmHg).    Discharge Physical Exam:  Temp:  [97.8 °F (36.6 °C)-98.6 °F (37 °C)] 98.6 °F (37 °C)  Heart Rate:  [45-58] 54  Resp:  [14] 14  BP: ()/(62-77) 114/66    Intake/Output Summary (Last 24 hours) at 08/22/18 0936  Last data filed at 08/22/18 0600   Gross per 24 hour   Intake              240 ml   Output                0 ml   Net              240 ml     Flowsheet Rows      First Filed Value   Admission Height  182.9 cm (72\") Documented at 08/20/2018 0551   Admission Weight  84.8 kg (187 lb) Documented at 08/20/2018 0551          General Appearance:    Alert, cooperative, in no acute distress   Head:    Normocephalic, without obvious abnormality, atraumatic       Neck:   No adenopathy, supple, no thyromegaly, no carotid bruit, no    JVD   Lungs:     Clear to auscultation bilaterally, no wheezes, rales, or     rhonchi    Heart:    Bradycardic rate, regular rhythm,  No murmur, rub, or gallop   Chest Wall:    No abnormalities observed   Abdomen:     Normal bowel sounds, soft, non-tender, non-distended,            no rebound tenderness   Extremities:   No cyanosis, clubbing, or edema   Pulses:   Pulses palpable and equal bilaterally   Skin:   No bleeding or rash       Neurologic:   Cranial nerves 2 - 12 grossly intact, " sensation intact             Discharge Medications     Discharge Medications      New Medications      Instructions Start Date   aspirin 81 MG chewable tablet   81 mg, Oral, Daily      atorvastatin 40 MG tablet  Commonly known as:  LIPITOR   40 mg, Oral, Nightly      clopidogrel 75 MG tablet  Commonly known as:  PLAVIX   75 mg, Oral, Daily      nicotine 14 MG/24HR patch  Commonly known as:  NICODERM CQ   1 patch, Transdermal, Every 24 Hours      nicotine 7 MG/24HR patch  Commonly known as:  NICODERM CQ   1 patch, Transdermal, Every 24 Hours, Start after 14 mg patch has been completed         Continue These Medications      Instructions Start Date   tamsulosin 0.4 MG capsule 24 hr capsule  Commonly known as:  FLOMAX   1 capsule, Oral, Nightly             Discharge Diet:  cardiac    Activity at Discharge:  ad gin.  No lifting greater than 10 pounds right arm for one week    Discharge disposition: Home    Condition on Discharge: Good    Follow-up Appointments  No future appointments.  Additional Instructions for the Follow-ups that You Need to Schedule     Discharge Follow-up with Specialty: Cardiology; 1 Week    As directed      Specialty:  Cardiology    Follow Up:  1 Week    Follow Up Details:  Our office will call with a one week appointment with Denice HINKLE. Please call [phone] with questions.               Test Results Pending at Discharge       Giovanny Leyva MD  08/22/18  9:34 AM    Greater than 30 min spent in reviewing records, discussion and examination of the patient and discussion with other members of the patient's medical team.     Dictated utilizing Dragon dictation

## 2018-08-23 ENCOUNTER — READMISSION MANAGEMENT (OUTPATIENT)
Dept: CALL CENTER | Facility: HOSPITAL | Age: 63
End: 2018-08-23

## 2018-08-23 NOTE — OUTREACH NOTE
Prep Survey      Responses   Facility patient discharged from?  [place]   Is patient eligible?  Yes   Discharge diagnosis  Acute MI, inferior wall, Tobacco abuse, heart cath (R Arm) with stent placement   Does the patient have one of the following disease processes/diagnoses(primary or secondary)?  Acute MI (STEMI,NSTEMI)   Does the patient have Home health ordered?  No   Is there a DME ordered?  No   Comments regarding appointments  Cardiac Rehab ordered   Medication alerts for this patient  Declined taking Brilinta, home on Plavix   General alerts for this patient  No lifting greater than 10 pounds right arm for one week   Prep survey completed?  Yes          Leslie Snyder RN

## 2018-08-27 ENCOUNTER — READMISSION MANAGEMENT (OUTPATIENT)
Dept: CALL CENTER | Facility: HOSPITAL | Age: 63
End: 2018-08-27

## 2018-08-27 NOTE — OUTREACH NOTE
AMI Week 1 Survey      Responses   Facility patient discharged from?  [place]   Does the patient have one of the following disease processes/diagnoses(primary or secondary)?  Acute MI (STEMI,NSTEMI)   Is there a successful TCM telephone encounter documented?  No   Week 1 attempt successful?  No   Unsuccessful attempts  Attempt 1          Camron Solis RN

## 2018-08-30 ENCOUNTER — READMISSION MANAGEMENT (OUTPATIENT)
Dept: CALL CENTER | Facility: HOSPITAL | Age: 63
End: 2018-08-30

## 2018-09-10 ENCOUNTER — READMISSION MANAGEMENT (OUTPATIENT)
Dept: CALL CENTER | Facility: HOSPITAL | Age: 63
End: 2018-09-10

## 2018-09-10 NOTE — OUTREACH NOTE
AMI Week 2 Survey      Responses   Facility patient discharged from?  [place]   Does the patient have one of the following disease processes/diagnoses(primary or secondary)?  Acute MI (STEMI,NSTEMI)   Week 2 attempt successful?  No   Unsuccessful attempts  Attempt 1          Flavio Lind RN

## 2018-09-13 ENCOUNTER — READMISSION MANAGEMENT (OUTPATIENT)
Dept: CALL CENTER | Facility: HOSPITAL | Age: 63
End: 2018-09-13

## 2018-09-13 NOTE — OUTREACH NOTE
AMI Week 2 Survey      Responses   Facility patient discharged from?  [place]   Does the patient have one of the following disease processes/diagnoses(primary or secondary)?  Acute MI (STEMI,NSTEMI)   Week 2 attempt successful?  Yes   Call start time  1614   Call end time  1625   General alerts for this patient  No lifting greater than 10 pounds right arm for one week   Discharge diagnosis  Acute MI, inferior wall, Tobacco abuse, heart cath (R Arm) with stent placement   Meds reviewed with patient/caregiver?  Yes   Is the patient having any side effects they believe may be caused by any medication additions or changes?  No   Does the patient have all prescriptions related to this admission filled (includes statins,anticoagulants,HTN meds,anti-arrhythmia meds)  Yes   Is the patient taking all medications as directed (includes completed medication regime)?  Yes   Does the patient have a primary care provider?   Yes   Does the patient have an appointment with their PCP,cardiologist,or clinic within 7 days of discharge?  Greater than 7 days   Has the patient kept scheduled appointments due by today?  No   Comments  Accidentally missed previous Cards appt. New appt tomorrow. Will make PCP appt after that. He is questioning if he would benefit from Cardiac Rehab as he is working hard already. States that he doesnt have any activity restrictions per MD.   Has home health visited the patient within 72 hours of discharge?  N/A   Psychosocial issues?  No   Comments  Pt quit smoking. Back to work. Trying to eat better.   Did the patient receive a copy of their discharge instructions?  Yes   Nursing interventions  Reviewed instructions with patient   What is the patient's perception of their health status since discharge?  Improving   Nursing interventions  Nurse provided patient education, Advised patient to call provider   Is the patient/caregiver able to teach back signs and symptoms of when to call for help immediately:   Sudden chest discomfort, Sudden discomfort in arms, back, neck or jaw, Dizziness or lightheadedness, Shortness of breath at any time, Sudden sweating or clammy skin, Nausea or vomiting, Irregular or rapid heart rate   Nursing interventions  Nurse provided patient education   Is the pateint /caregiver able to teach back the importance of cardiac rehab?  Yes   Nursing interventions  Provided education on importance of cardiac rehab   Is the patient/caregiver able to teach back lifestyle changes to help prevent MIs  Quit smoking, Reducing stress, Regular exercise as approved by provider, Heart healthy diet   Is the patient/caregiver able to teach back ways to prevent a second heart attack:  Take medications, Participate in Cardiac Rehab, Follow up with MD   Is the patient/caregiver able to teach back the hierarchy of who to call/visit for symptoms/problems? PCP, Specialist, Home health nurse, Urgent Care, ED, 911  Yes   Week 2 call completed?  Yes          Flavio Lind RN

## 2018-09-14 ENCOUNTER — HOSPITAL ENCOUNTER (EMERGENCY)
Facility: HOSPITAL | Age: 63
Discharge: HOME | End: 2018-09-14
Attending: EMERGENCY MEDICINE | Admitting: EMERGENCY MEDICINE

## 2018-09-14 ENCOUNTER — APPOINTMENT (OUTPATIENT)
Dept: GENERAL RADIOLOGY | Facility: HOSPITAL | Age: 63
End: 2018-09-14

## 2018-09-14 ENCOUNTER — OFFICE VISIT (OUTPATIENT)
Dept: CARDIOLOGY | Facility: CLINIC | Age: 63
End: 2018-09-14

## 2018-09-14 VITALS
BODY MASS INDEX: 25.41 KG/M2 | HEIGHT: 72 IN | DIASTOLIC BLOOD PRESSURE: 69 MMHG | SYSTOLIC BLOOD PRESSURE: 109 MMHG | RESPIRATION RATE: 16 BRPM | WEIGHT: 187.6 LBS | TEMPERATURE: 98.1 F | OXYGEN SATURATION: 97 % | HEART RATE: 57 BPM

## 2018-09-14 VITALS
HEART RATE: 55 BPM | BODY MASS INDEX: 24.92 KG/M2 | WEIGHT: 184 LBS | SYSTOLIC BLOOD PRESSURE: 124 MMHG | DIASTOLIC BLOOD PRESSURE: 76 MMHG | HEIGHT: 72 IN

## 2018-09-14 DIAGNOSIS — J40 BRONCHITIS: ICD-10-CM

## 2018-09-14 DIAGNOSIS — I07.1 TRICUSPID VALVE INSUFFICIENCY, UNSPECIFIED ETIOLOGY: ICD-10-CM

## 2018-09-14 DIAGNOSIS — Z72.0 TOBACCO ABUSE: ICD-10-CM

## 2018-09-14 DIAGNOSIS — I25.10 CORONARY ARTERY DISEASE INVOLVING NATIVE CORONARY ARTERY OF NATIVE HEART WITHOUT ANGINA PECTORIS: ICD-10-CM

## 2018-09-14 DIAGNOSIS — R00.2 PALPITATIONS: Primary | ICD-10-CM

## 2018-09-14 DIAGNOSIS — I21.19 ACUTE MI, INFERIOR WALL (HCC): Primary | ICD-10-CM

## 2018-09-14 DIAGNOSIS — I42.9 CARDIOMYOPATHY, UNSPECIFIED TYPE (HCC): ICD-10-CM

## 2018-09-14 DIAGNOSIS — R00.2 HEART PALPITATIONS: ICD-10-CM

## 2018-09-14 DIAGNOSIS — R42 DIZZINESS: ICD-10-CM

## 2018-09-14 DIAGNOSIS — Z95.5 STATUS POST INSERTION OF DRUG-ELUTING STENT INTO RIGHT CORONARY ARTERY FOR CORONARY ARTERY DISEASE: ICD-10-CM

## 2018-09-14 LAB
ALBUMIN SERPL-MCNC: 3.8 G/DL (ref 3.5–5.2)
ALBUMIN/GLOB SERPL: 1.5 G/DL
ALP SERPL-CCNC: 62 U/L (ref 39–117)
ALT SERPL W P-5'-P-CCNC: 22 U/L (ref 1–41)
ANION GAP SERPL CALCULATED.3IONS-SCNC: 11.5 MMOL/L
AST SERPL-CCNC: 15 U/L (ref 1–40)
BASOPHILS # BLD AUTO: 0.02 10*3/MM3 (ref 0–0.2)
BASOPHILS NFR BLD AUTO: 0.3 % (ref 0–1.5)
BILIRUB SERPL-MCNC: 0.2 MG/DL (ref 0.1–1.2)
BUN BLD-MCNC: 13 MG/DL (ref 8–23)
BUN/CREAT SERPL: 13.5 (ref 7–25)
CALCIUM SPEC-SCNC: 9 MG/DL (ref 8.6–10.5)
CHLORIDE SERPL-SCNC: 104 MMOL/L (ref 98–107)
CO2 SERPL-SCNC: 25.5 MMOL/L (ref 22–29)
CREAT BLD-MCNC: 0.96 MG/DL (ref 0.76–1.27)
DEPRECATED RDW RBC AUTO: 45.2 FL (ref 37–54)
EOSINOPHIL # BLD AUTO: 0.16 10*3/MM3 (ref 0–0.7)
EOSINOPHIL NFR BLD AUTO: 2.6 % (ref 0.3–6.2)
ERYTHROCYTE [DISTWIDTH] IN BLOOD BY AUTOMATED COUNT: 13.6 % (ref 11.5–14.5)
GFR SERPL CREATININE-BSD FRML MDRD: 80 ML/MIN/1.73
GLOBULIN UR ELPH-MCNC: 2.6 GM/DL
GLUCOSE BLD-MCNC: 101 MG/DL (ref 65–99)
HCT VFR BLD AUTO: 43.9 % (ref 40.4–52.2)
HGB BLD-MCNC: 14.1 G/DL (ref 13.7–17.6)
HOLD SPECIMEN: NORMAL
HOLD SPECIMEN: NORMAL
IMM GRANULOCYTES # BLD: 0 10*3/MM3 (ref 0–0.03)
IMM GRANULOCYTES NFR BLD: 0 % (ref 0–0.5)
INR PPP: 1.05 (ref 0.9–1.1)
LYMPHOCYTES # BLD AUTO: 2.3 10*3/MM3 (ref 0.9–4.8)
LYMPHOCYTES NFR BLD AUTO: 37.5 % (ref 19.6–45.3)
MAGNESIUM SERPL-MCNC: 2.3 MG/DL (ref 1.6–2.4)
MCH RBC QN AUTO: 29.3 PG (ref 27–32.7)
MCHC RBC AUTO-ENTMCNC: 32.1 G/DL (ref 32.6–36.4)
MCV RBC AUTO: 91.1 FL (ref 79.8–96.2)
MONOCYTES # BLD AUTO: 0.65 10*3/MM3 (ref 0.2–1.2)
MONOCYTES NFR BLD AUTO: 10.6 % (ref 5–12)
NEUTROPHILS # BLD AUTO: 3 10*3/MM3 (ref 1.9–8.1)
NEUTROPHILS NFR BLD AUTO: 49 % (ref 42.7–76)
NT-PROBNP SERPL-MCNC: 263.2 PG/ML (ref 5–900)
PLATELET # BLD AUTO: 190 10*3/MM3 (ref 140–500)
PMV BLD AUTO: 11 FL (ref 6–12)
POTASSIUM BLD-SCNC: 4.1 MMOL/L (ref 3.5–5.2)
PROT SERPL-MCNC: 6.4 G/DL (ref 6–8.5)
PROTHROMBIN TIME: 13.5 SECONDS (ref 11.7–14.2)
RBC # BLD AUTO: 4.82 10*6/MM3 (ref 4.6–6)
SODIUM BLD-SCNC: 141 MMOL/L (ref 136–145)
TROPONIN T SERPL-MCNC: <0.01 NG/ML (ref 0–0.03)
TSH SERPL DL<=0.05 MIU/L-ACNC: 1.17 MIU/ML (ref 0.27–4.2)
WBC NRBC COR # BLD: 6.13 10*3/MM3 (ref 4.5–10.7)
WHOLE BLOOD HOLD SPECIMEN: NORMAL
WHOLE BLOOD HOLD SPECIMEN: NORMAL

## 2018-09-14 PROCEDURE — 93005 ELECTROCARDIOGRAM TRACING: CPT | Performed by: EMERGENCY MEDICINE

## 2018-09-14 PROCEDURE — 96374 THER/PROPH/DIAG INJ IV PUSH: CPT

## 2018-09-14 PROCEDURE — 99284 EMERGENCY DEPT VISIT MOD MDM: CPT

## 2018-09-14 PROCEDURE — 99214 OFFICE O/P EST MOD 30 MIN: CPT | Performed by: NURSE PRACTITIONER

## 2018-09-14 PROCEDURE — 80053 COMPREHEN METABOLIC PANEL: CPT | Performed by: NURSE PRACTITIONER

## 2018-09-14 PROCEDURE — 94799 UNLISTED PULMONARY SVC/PX: CPT

## 2018-09-14 PROCEDURE — 93005 ELECTROCARDIOGRAM TRACING: CPT

## 2018-09-14 PROCEDURE — 84443 ASSAY THYROID STIM HORMONE: CPT | Performed by: EMERGENCY MEDICINE

## 2018-09-14 PROCEDURE — 85610 PROTHROMBIN TIME: CPT | Performed by: NURSE PRACTITIONER

## 2018-09-14 PROCEDURE — 84484 ASSAY OF TROPONIN QUANT: CPT | Performed by: NURSE PRACTITIONER

## 2018-09-14 PROCEDURE — 71046 X-RAY EXAM CHEST 2 VIEWS: CPT

## 2018-09-14 PROCEDURE — 85025 COMPLETE CBC W/AUTO DIFF WBC: CPT | Performed by: NURSE PRACTITIONER

## 2018-09-14 PROCEDURE — 25010000002 ONDANSETRON PER 1 MG: Performed by: NURSE PRACTITIONER

## 2018-09-14 PROCEDURE — 94640 AIRWAY INHALATION TREATMENT: CPT

## 2018-09-14 PROCEDURE — 93010 ELECTROCARDIOGRAM REPORT: CPT | Performed by: INTERNAL MEDICINE

## 2018-09-14 PROCEDURE — 93000 ELECTROCARDIOGRAM COMPLETE: CPT | Performed by: NURSE PRACTITIONER

## 2018-09-14 PROCEDURE — 83735 ASSAY OF MAGNESIUM: CPT | Performed by: EMERGENCY MEDICINE

## 2018-09-14 PROCEDURE — 83880 ASSAY OF NATRIURETIC PEPTIDE: CPT | Performed by: NURSE PRACTITIONER

## 2018-09-14 RX ORDER — ALBUTEROL SULFATE 2.5 MG/3ML
2.5 SOLUTION RESPIRATORY (INHALATION) ONCE
Status: COMPLETED | OUTPATIENT
Start: 2018-09-14 | End: 2018-09-14

## 2018-09-14 RX ORDER — DOXYCYCLINE 100 MG/1
100 CAPSULE ORAL 2 TIMES DAILY
Qty: 20 CAPSULE | Refills: 0 | Status: SHIPPED | OUTPATIENT
Start: 2018-09-14 | End: 2018-10-01

## 2018-09-14 RX ORDER — ALBUTEROL SULFATE 90 UG/1
2 AEROSOL, METERED RESPIRATORY (INHALATION) EVERY 6 HOURS PRN
Qty: 1 INHALER | Refills: 0 | Status: ON HOLD | OUTPATIENT
Start: 2018-09-14 | End: 2022-08-27

## 2018-09-14 RX ORDER — NITROGLYCERIN 0.4 MG/1
TABLET SUBLINGUAL
Qty: 45 TABLET | Refills: 11 | Status: SHIPPED | OUTPATIENT
Start: 2018-09-14 | End: 2021-03-22 | Stop reason: SDUPTHER

## 2018-09-14 RX ORDER — NITROGLYCERIN 0.4 MG/1
TABLET SUBLINGUAL
Qty: 45 TABLET | Refills: 11 | Status: SHIPPED | OUTPATIENT
Start: 2018-09-14 | End: 2018-09-14 | Stop reason: SDUPTHER

## 2018-09-14 RX ORDER — ONDANSETRON 2 MG/ML
4 INJECTION INTRAMUSCULAR; INTRAVENOUS ONCE
Status: COMPLETED | OUTPATIENT
Start: 2018-09-14 | End: 2018-09-14

## 2018-09-14 RX ORDER — SODIUM CHLORIDE 0.9 % (FLUSH) 0.9 %
10 SYRINGE (ML) INJECTION AS NEEDED
Status: DISCONTINUED | OUTPATIENT
Start: 2018-09-14 | End: 2018-09-14 | Stop reason: HOSPADM

## 2018-09-14 RX ADMIN — ALBUTEROL SULFATE 2.5 MG: 2.5 SOLUTION RESPIRATORY (INHALATION) at 17:12

## 2018-09-14 RX ADMIN — ONDANSETRON 4 MG: 2 INJECTION INTRAMUSCULAR; INTRAVENOUS at 17:23

## 2018-09-14 NOTE — ED PROVIDER NOTES
" EMERGENCY DEPARTMENT ENCOUNTER    CHIEF COMPLAINT  Chief Complaint: Palpitations  History given by: Patient   History limited by: none  Room Number: 20/20  PMD: Lit Salgado MD      HPI:  Pt is a 62 y.o. male, Hx of MI and stent placement, who presents complaining of palpitations(\"skipping beats and tachycardia\") that began this morning. Pt reports palpitations 30-40 today. Pt saw his Cardiologist Dr. Leyva today who plans to put pt on holter monitor next week for palpitations. Pt reports SOA, dizziness, productive cough with colored sputum.       Duration:  1 day  Onset: gradual  Timing: episodic  Location: NA  Radiation: NA  Quality: \"skipping beats and tachycardic\"  Intensity/Severity: moderate  Associated Symptoms: SOA, dizziness, productive cough with colored sputum  Previous Episodes: Pt has Hx of MI and stent placement.     PAST MEDICAL HISTORY  Active Ambulatory Problems     Diagnosis Date Noted   • Acute MI, inferior wall (CMS/HCC) 08/20/2018   • BPH (benign prostatic hyperplasia) 10/14/2013   • DDD (degenerative disc disease), cervical 10/01/2013   • Disequilibrium 12/02/2013   • Dyslipidemia 10/14/2013   • Hematochezia 11/30/2012   • Inguinal hernia 11/30/2012   • TIA (transient ischemic attack) 12/02/2013   • Tobacco abuse 10/14/2013   • Vertigo 10/14/2013   • Tricuspid regurgitation 08/20/2018   • Status post insertion of drug-eluting stent into right coronary artery for coronary artery disease 08/20/2018   • Heart palpitations 09/14/2018   • Dizziness 09/14/2018     Resolved Ambulatory Problems     Diagnosis Date Noted   • No Resolved Ambulatory Problems     Past Medical History:   Diagnosis Date   • BPH (benign prostatic hyperplasia)    • Bradycardia    • Cancer (CMS/HCC)    • Coronary artery disease    • Enlarged prostate    • Hyperlipidemia    • Hypotension    • Myocardial infarction        PAST SURGICAL HISTORY  Past Surgical History:   Procedure Laterality Date   • CARDIAC " "CATHETERIZATION     • CARDIAC CATHETERIZATION N/A 8/20/2018    Procedure: Left Heart Cath;  Surgeon: Giovanny Leyva MD;  Location:  [facility];  Service: Cardiology   • CARDIAC CATHETERIZATION N/A 8/20/2018    Procedure: Stent CARRIE coronary;  Surgeon: Giovanny Leyva MD;  Location:  [facility];  Service: Cardiology   • CARDIAC CATHETERIZATION N/A 8/20/2018    Procedure: Coronary angiography;  Surgeon: Giovanny Leyva MD;  Location:  [facility];  Service: Cardiology   • CARDIAC CATHETERIZATION N/A 8/20/2018    Procedure: Left ventriculography;  Surgeon: Giovanny Leyva MD;  Location:  [facility];  Service: Cardiology   • CARDIAC CATHETERIZATION  8/20/2018    Procedure: Percutaneous Mechanical Thrombectomy;  Surgeon: Giovanny Leyva MD;  Location:  [facility];  Service: Cardiology   • RI RT/LT HEART CATHETERS N/A 8/20/2018    Procedure: Percutaneous Coronary Intervention;  Surgeon: Giovanny Leyva MD;  Location:  [facility];  Service: Cardiology   • SKIN BIOPSY     • SKIN CANCER EXCISION  2001    Basal cell carcinoma x 2, malignant melanoma x 1   • WRIST SURGERY Left 2007       FAMILY HISTORY  History reviewed. No pertinent family history.    SOCIAL HISTORY  Social History     Social History   • Marital status: Single     Spouse name: N/A   • Number of children: N/A   • Years of education: N/A     Occupational History   • Not on file.     Social History Main Topics   • Smoking status: Current Every Day Smoker     Packs/day: 1.00     Types: Electronic Cigarette   • Smokeless tobacco: Never Used   • Alcohol use Yes      Comment: \"a little, not much\"   • Drug use: No   • Sexual activity: Defer     Other Topics Concern   • Not on file     Social History Narrative   • No narrative on file       ALLERGIES  Penicillins and Sertraline    REVIEW OF SYSTEMS  Review of Systems " "  Constitutional: Negative for activity change, appetite change and fever.   HENT: Negative for congestion and sore throat.    Eyes: Negative.    Respiratory: Positive for cough (productive) and shortness of breath.    Cardiovascular: Positive for palpitations (\"skipping, fast\"). Negative for chest pain and leg swelling.   Gastrointestinal: Negative for abdominal pain, diarrhea and vomiting.   Endocrine: Negative.    Genitourinary: Negative for decreased urine volume and dysuria.   Musculoskeletal: Negative for neck pain.   Skin: Negative for rash and wound.   Allergic/Immunologic: Negative.    Neurological: Positive for dizziness. Negative for weakness, numbness and headaches.   Hematological: Negative.    Psychiatric/Behavioral: Negative.    All other systems reviewed and are negative.      PHYSICAL EXAM  ED Triage Vitals   Temp Heart Rate Resp BP SpO2   -- 09/14/18 1558 09/14/18 1558 09/14/18 1615 09/14/18 1558    63 16 130/83 98 %      Temp src Heart Rate Source Patient Position BP Location FiO2 (%)   -- 09/14/18 1558 09/14/18 1615 09/14/18 1615 --    Monitor Lying Right arm        Physical Exam   Constitutional: He is oriented to person, place, and time. No distress.   HENT:   Head: Normocephalic and atraumatic.   Eyes: Pupils are equal, round, and reactive to light. EOM are normal.   Neck: Normal range of motion. Neck supple.   Cardiovascular: Regular rhythm and normal heart sounds.  Bradycardia present.    Pulmonary/Chest: Effort normal and breath sounds normal. No respiratory distress.   Abdominal: Soft. There is no tenderness. There is no rebound and no guarding.   Musculoskeletal: Normal range of motion. He exhibits no edema.   Neurological: He is alert and oriented to person, place, and time. He has normal sensation and normal strength.   Skin: Skin is warm and dry.   Psychiatric: Mood and affect normal.   Nursing note and vitals reviewed.      LAB RESULTS  Lab Results (last 24 hours)     Procedure " Component Value Units Date/Time    CBC & Differential [[id number]] Collected:  09/14/18 1617    Specimen:  Blood Updated:  09/14/18 1654    Narrative:       The following orders were created for panel order CBC & Differential.  Procedure                               Abnormality         Status                     ---------                               -----------         ------                     CBC Auto Differential[[id number]]        Abnormal            Final result                 Please view results for these tests on the individual orders.    Comprehensive Metabolic Panel [[id number]]  (Abnormal) Collected:  09/14/18 1617    Specimen:  Blood Updated:  09/14/18 1745     Glucose 101 (H) mg/dL      BUN 13 mg/dL      Creatinine 0.96 mg/dL      Sodium 141 mmol/L      Potassium 4.1 mmol/L      Chloride 104 mmol/L      CO2 25.5 mmol/L      Calcium 9.0 mg/dL      Total Protein 6.4 g/dL      Albumin 3.80 g/dL      ALT (SGPT) 22 U/L      AST (SGOT) 15 U/L      Alkaline Phosphatase 62 U/L      Total Bilirubin 0.2 mg/dL      eGFR Non African Amer 80 mL/min/1.73      Globulin 2.6 gm/dL      A/G Ratio 1.5 g/dL      BUN/Creatinine Ratio 13.5     Anion Gap 11.5 mmol/L     Protime-INR [[id number]]  (Normal) Collected:  09/14/18 1617    Specimen:  Blood Updated:  09/14/18 1658     Protime 13.5 Seconds      INR 1.05    BNP [[id number]]  (Normal) Collected:  09/14/18 1617    Specimen:  Blood Updated:  09/14/18 1752     proBNP 263.2 pg/mL     Narrative:       Among patients with dyspnea, NT-proBNP is highly sensitive for the detection of acute congestive heart failure. In addition NT-proBNP of <300 pg/ml effectively rules out acute congestive heart failure with 99% negative predictive value.    Troponin [[id number]]  (Normal) Collected:  09/14/18 1617    Specimen:  Blood Updated:  09/14/18 1752     Troponin T <0.010 ng/mL     Narrative:       Troponin T Reference Ranges:  Less than 0.03 ng/mL:    Negative for AMI  0.03 to 0.09 ng/mL:       Indeterminant for AMI  Greater than 0.09 ng/mL: Positive for AMI    CBC Auto Differential [[id number]]  (Abnormal) Collected:  09/14/18 1617    Specimen:  Blood Updated:  09/14/18 1654     WBC 6.13 10*3/mm3      RBC 4.82 10*6/mm3      Hemoglobin 14.1 g/dL      Hematocrit 43.9 %      MCV 91.1 fL      MCH 29.3 pg      MCHC 32.1 (L) g/dL      RDW 13.6 %      RDW-SD 45.2 fl      MPV 11.0 fL      Platelets 190 10*3/mm3      Neutrophil % 49.0 %      Lymphocyte % 37.5 %      Monocyte % 10.6 %      Eosinophil % 2.6 %      Basophil % 0.3 %      Immature Grans % 0.0 %      Neutrophils, Absolute 3.00 10*3/mm3      Lymphocytes, Absolute 2.30 10*3/mm3      Monocytes, Absolute 0.65 10*3/mm3      Eosinophils, Absolute 0.16 10*3/mm3      Basophils, Absolute 0.02 10*3/mm3      Immature Grans, Absolute 0.00 10*3/mm3     Magnesium [[id number]]  (Normal) Collected:  09/14/18 1617    Specimen:  Blood Updated:  09/14/18 1745     Magnesium 2.3 mg/dL     TSH [[id number]]  (Normal) Collected:  09/14/18 1617    Specimen:  Blood Updated:  09/14/18 1752     TSH 1.170 mIU/mL           I ordered the above labs and reviewed the results    RADIOLOGY  XR Chest 2 View   Final Result   No evidence for acute pulmonary process. Follow-up as   clinical indications persist.       This report was finalized on 9/14/2018 5:21 PM by Dr. Parveen Friend M.D.               I ordered the above noted radiological studies. Interpreted by radiologist. Reviewed by me in PACS.       PROCEDURES  Procedures    EKG           EKG time: 1555  Rhythm/Rate: Jez, 45  P waves and ME: Nml  QRS, axis: Nml QRS, LAD  ST and T waves: inferior T wave inversion     Interpreted Contemporaneously by me, independently viewed  unchanged compared to prior 8/21/18    PROGRESS AND CONSULTS  ED Course as of Sep 14 1848   Fri Sep 14, 2018   1639 Recent stemi.  States this am he had same sensations/feelings as last week.    [EP]      ED Course User Index  [EP] Ely Youssef, VARINDER      1553  Ordered EKG    1651  Ordered lab work and CXR    1859  Notified pt of unchanged EKG.    1840  Discussed the pt's case with Dr. White, Cardiology who recommends pt to be discharged and f/u next week for Holter.    1843  Pt recheck. Notified pt of negative lab work and CXR. Discussed with pt the plan to discharge him home and f/u with Dr. Leyva, Cardiology as planned for Holter monitor. Abx and inhaler given. Pt understands and agrees with treatment plan. All concerns addressed.      MEDICAL DECISION MAKING  Results were reviewed/discussed with the patient and they were also made aware of online access. Pt also made aware that some labs, such as cultures, will not be resulted during ER visit and follow up with PMD is necessary.     MDM  Number of Diagnoses or Management Options  Bronchitis:   Coronary artery disease involving native coronary artery of native heart without angina pectoris:   Palpitations:      Amount and/or Complexity of Data Reviewed  Clinical lab tests: reviewed and ordered (Lab work is unremarkable. )  Tests in the radiology section of CPT®: ordered and reviewed (CXR show no acute findings. )  Tests in the medicine section of CPT®: reviewed and ordered (See EKG results in procedure. )  Decide to obtain previous medical records or to obtain history from someone other than the patient: yes  Review and summarize past medical records: yes (Pt had MI august 20th 2018 and had stent placement. )  Discuss the patient with other providers: yes (Dr. White, Cardiology)  Independent visualization of images, tracings, or specimens: yes           DIAGNOSIS  Final diagnoses:   Palpitations   Coronary artery disease involving native coronary artery of native heart without angina pectoris   Bronchitis       DISPOSITION  DISCHARGE    Patient discharged in stable condition.    Reviewed implications of results, diagnosis, meds, responsibility to follow up, warning signs and symptoms of possible worsening,  potential complications and reasons to return to ER.    Patient/Family voiced understanding of above instructions.    Discussed plan for discharge, as there is no emergent indication for admission. Patient referred to primary care provider for BP management due to today's BP. Pt/family is agreeable and understands need for follow up and repeat testing.  Pt is aware that discharge does not mean that nothing is wrong but it indicates no emergency is present that requires admission and they must continue care with follow-up as given below or physician of their choice.     FOLLOW-UP  Lit Salgado MD  [address]  [facility] [address]  [phone]    Schedule an appointment as soon as possible for a visit       Giovanny Leyva MD  [address]  [facility] [id number] [phone]      follow up as scheduled         Medication List      New Prescriptions    albuterol 108 (90 Base) MCG/ACT inhaler  Commonly known as:  PROVENTIL HFA;VENTOLIN HFA  Inhale 2 puffs Every 6 (Six) Hours As Needed for Wheezing.     doxycycline 100 MG capsule  Commonly known as:  MONODOX  Take 1 capsule by mouth 2 (Two) Times a Day.              Latest Documented Vital Signs:  As of 6:48 PM  BP- 106/76 HR- 59 Temp- 98.1 °F (36.7 °C) (Oral) O2 sat- 97%    --  Documentation assistance provided by aniya Medrano for Dr. Herbert.  Information recorded by the scribe was done at my direction and has been verified and validated by me.          Christopher Medrano  09/14/18 1849       Sanchez Herbert MD  09/14/18 1918

## 2018-09-14 NOTE — ED TRIAGE NOTES
Pt c/o frequent palpitations, SOA nausea and dizziness since this morning. pt followed up with Dr. Leyav this morning after having an MI on 8/20, states EKG this morning was normal

## 2018-09-14 NOTE — PROGRESS NOTES
Date of Office Visit: 2018  Encounter Provider: VARINDER Koenig  Place of Service: [facility] CARDIOLOGY  Patient Name: Flavio Frost  :1955      Subjective:     Chief Complaint: MI s/p CARRIE to RCA    History of Present Illness:  Flavio Frost is a pleasant 62 y.o. male, who is new to me.  Outside records have been obtained and reviewed by me.  The patient has a past medical history of hyperlipidemia, skin cancer, enlarged prostate, and current smoker.    On 18 he presented to the ED via EMS in the morning with complaints of chest pain that started when he woke up to use the restroom.  He had associated diaphoresis and mild shortness of breath.  His EKG showed inferior ST elevation and sinus bradycardia.  The pain was still ongoing when he arrived to the cath lab.  He underwent coronary angiography with an occluded mid RCA.  Dr. Leyva placed a drug-eluting stent from the proximal to mid RCA.  The stent was postdilated to 3.5 mm with a noncompliant balloon in the proximal to mid segment.  He received multiple doses of intracoronary adenosine and nitroglycerin along with intracoronary Integrilin.  He tolerated this well.  He was noted to have an ischemic cardiomyopathy based on his EF on ventriculogram.  He was started on dual antiplatelet therapy with aspirin and ticagrelor.  He was started on metoprolol with hold parameters as he had some mild hypotension during his cath.  The next hospital day the metoprolol was stopped because the patient's resting heart rate was less than 50 bpm.  Was still having some mild chest discomfort but his ST segment elevations had resolved.  Dr. Leyva wanted to start him on low dose Lisinopril, because of his mild hypotension he was unable to do that.  Because the patient does not have insurance he was unable to continue with Ticagrelor so he was switched to Plavix therapy.     His lipid panel on 18 revealed total cholesterol  of 124, triglycerides 112, HDL was low at 25, LDL was 77.  He had a normal hemoglobin A1c.  His calcium was low at 7.9 (8.6) and chloride mildly high at 108 (107). I will defer to his PCP to address this.  His kidney function and potassium levels were within normal limits.  His CBC showed a mildly low RBC at 4.44 (4.60), hemoglobin was slightly low at 13.1 (13.7) MCHG 30.7 (32.6), Platelet count was 149,000 WNL.    He missed his initial hospital follow-up visit with Denice Forman PA-C on 9/4/18.  He his here today to follow-up post cardiac cath and stent placement.  Today he is feeling much better with report of significantly increased strength and stamina.  He has had no chest pain, though his presenting symptoms with his MI did not include chest pain but rather mild chest tightness, along with dizziness, nausea, cold sweats and shortness of breath.  In which he has not experienced since.  He does report that he still experiences shortness of breath which he has for years.  He denies ever having a workup for COPD or using inhalers.  He also reports that this week starting on Monday he developed cold symptoms and a sore throat as well as some wheezing in his chest.  Throughout Tuesday Wednesday and Thursday of this week and by today all of these symptoms have improved besides the wheezing.  He has not followed up with his PCP since hospitalization or since these cold symptoms have started.  He is seen to find a new PCP outside of [facility].  He denies any swelling of the legs orthopnea, PND, syncope or near syncope.  He reports some intermittent periods of dizziness and lightheadedness which he has had off-and-on for years.  He does not know any triggers for these episodes but thinks he may have some carotid artery stenosis.  He also states that he is having more frequent palpitations since his heart catheter and stent placement.  He was having the sensation of skipped beats prior to the procedure, but  they've gotten more frequent.  He denies the sensation that his heart is racing or getting irregular he just feels that his heart is skipping beats.  He reports that this occurs all throughout the day but more so in the morning.  He does not really have chest pain with this or any other symptoms.  Reports that while he was in the hospital when he was having these skipped beats he would look up at the heart monitor and noticed abnormal beats on the monitor.  He thinks that since he is no longer smoking cigarettes and now just using an electronic cigarette and since he has cut back on his caffeine intake that this may have a exacerbated this feeling.  The patient quit smoking for one week but then started using the e-cig.  He is not using any nicotine patches.  He states is tolerating all his new medications with no side effects and he is taking them everyday as prescribed.  He denies any bleeding issues, blood in his stool or urine.      Testin18 Echo post cath:   Left ventricular systolic function is normal. Estimated EF = 70%.  Mild tricuspid valve regurgitation is present.  Estimated right ventricular systolic pressure from tricuspid regurgitation is normal (<35 mmHg).      Procedures:    18 Cardiac Cath Conclusions:   1. Left main: Normal  2. LAD: Discrete 70-80% apical stenosis  3. LCX: 60% proximal OM1 stenosis  4. RCA: Acutely occluded in the midsegment  5.  Moderately depressed left ventricular systolic function.  Ejection fraction 30%.  Basal to mid inferior wall akinesis.  Mild anterolateral hypokinesis  6.  Successful PCI and aspiration thrombectomy of the RCA.  3.0 x 38 mm Xience Lilo drug-eluting stent deployed across the proximal to mid segment and postdilated in the proximal to mid segment with a 3.5 x 20 mm noncompliant trek balloon to high pressure      Past Medical History:   Diagnosis Date   • BPH (benign prostatic hyperplasia)    • Bradycardia    • Cancer (CMS/HCC)     skin cancer    • Coronary artery disease    • Enlarged prostate    • Hyperlipidemia    • Hypotension    • Myocardial infarction      Past Surgical History:   Procedure Laterality Date   • CARDIAC CATHETERIZATION     • CARDIAC CATHETERIZATION N/A 8/20/2018    Procedure: Left Heart Cath;  Surgeon: Giovanny Leyva MD;  Location:  [facility];  Service: Cardiology   • CARDIAC CATHETERIZATION N/A 8/20/2018    Procedure: Stent CARRIE coronary;  Surgeon: Giovanny Leyva MD;  Location:  [facility];  Service: Cardiology   • CARDIAC CATHETERIZATION N/A 8/20/2018    Procedure: Coronary angiography;  Surgeon: Giovanny Leyva MD;  Location:  [facility];  Service: Cardiology   • CARDIAC CATHETERIZATION N/A 8/20/2018    Procedure: Left ventriculography;  Surgeon: Giovanny Leyva MD;  Location:  [facility];  Service: Cardiology   • CARDIAC CATHETERIZATION  8/20/2018    Procedure: Percutaneous Mechanical Thrombectomy;  Surgeon: Giovanny Leyva MD;  Location:  [facility];  Service: Cardiology   • SD RT/LT HEART CATHETERS N/A 8/20/2018    Procedure: Percutaneous Coronary Intervention;  Surgeon: Giovanny Leyva MD;  Location:  [facility];  Service: Cardiology   • SKIN BIOPSY     • SKIN CANCER EXCISION  2001    Basal cell carcinoma x 2, malignant melanoma x 1   • WRIST SURGERY Left 2007     Outpatient Medications Prior to Visit   Medication Sig Dispense Refill   • aspirin 81 MG chewable tablet Chew 1 tablet Daily. 30 tablet 11   • atorvastatin (LIPITOR) 40 MG tablet Take 1 tablet by mouth Every Night. 30 tablet 6   • clopidogrel (PLAVIX) 75 MG tablet Take 1 tablet by mouth Daily. 30 tablet 11   • tamsulosin (FLOMAX) 0.4 MG capsule 24 hr capsule Take 1 capsule by mouth Every Night.     • nicotine (NICODERM CQ) 14 MG/24HR patch Place 1 patch on the skin as directed by provider Daily. 14 patch 0   • nicotine  "(NICODERM CQ) 7 MG/24HR patch Place 1 patch on the skin as directed by provider Daily. Start after 14 mg patch has been completed 28 patch 0     No facility-administered medications prior to visit.        Allergies as of 09/14/2018 - Reviewed 09/14/2018   Allergen Reaction Noted   • Penicillins Hives 08/20/2018   • Sertraline Anxiety 01/22/2014     Social History     Social History   • Marital status: Unknown     Spouse name: N/A   • Number of children: N/A   • Years of education: N/A     Occupational History   • Not on file.     Social History Main Topics   • Smoking status: Current Every Day Smoker     Packs/day: 1.00     Types: Electronic Cigarette   • Smokeless tobacco: Never Used   • Alcohol use Yes      Comment: \"a little, not much\"   • Drug use: No   • Sexual activity: Defer     Other Topics Concern   • Not on file     Social History Narrative   • No narrative on file     History reviewed. No pertinent family history.  Review of Systems   Constitution: Negative for chills, fever and malaise/fatigue.   HENT: Positive for sore throat (started monday and has improved and is better today). Negative for ear pain, hearing loss and nosebleeds.    Eyes: Negative for double vision, pain, vision loss in left eye and vision loss in right eye.   Cardiovascular: Positive for irregular heartbeat (feelings like heart is skipping a beat- has gotten worse since MI and stent placement and cutting back on smoking) and palpitations. Negative for chest pain, claudication, dyspnea on exertion, leg swelling, near-syncope, orthopnea, paroxysmal nocturnal dyspnea and syncope.   Respiratory: Positive for cough (started Monday and has improved throughout the week and is better today), shortness of breath and wheezing (started Monday and has persisted. has not followd up with PCP since hospital). Negative for snoring. Sleep disturbances due to breathing: same as prior to MI.    Endocrine: Negative for cold intolerance and heat " "intolerance.   Hematologic/Lymphatic: Negative for bleeding problem.   Skin: Negative for color change, itching, rash and unusual hair distribution.   Musculoskeletal: Negative for joint pain and joint swelling.   Gastrointestinal: Negative for abdominal pain, diarrhea, hematochezia, melena, nausea and vomiting.   Genitourinary: Negative for decreased libido, frequency, hematuria, hesitancy and incomplete emptying.   Neurological: Positive for paresthesias (intemittent numbnes/tingling of the arm- chronic for years). Negative for excessive daytime sleepiness, dizziness, headaches, light-headedness, loss of balance and seizures.   Psychiatric/Behavioral: Negative for depression.          Objective:     Vitals:    09/14/18 0844   BP: 124/76   BP Location: Left arm   Patient Position: Sitting   Pulse: 55   Weight: 83.5 kg (184 lb)   Height: 182.9 cm (72\")     Body mass index is 24.95 kg/m².    PHYSICAL EXAM:  Physical Exam   Constitutional: He is oriented to person, place, and time. He appears well-developed and well-nourished. No distress.   HENT:   Head: Normocephalic and atraumatic.   Hoarse vocal quality   Eyes: Pupils are equal, round, and reactive to light.   Neck: Trachea normal. Normal carotid pulses and no JVD present. Carotid bruit is not present.   Cardiovascular: Regular rhythm, normal heart sounds and intact distal pulses.  Bradycardia present.  Exam reveals no S3 and no S4.    No murmur heard.  Pulses:       Carotid pulses are 2+ on the right side, and 2+ on the left side.       Radial pulses are 2+ on the right side, and 2+ on the left side.        Dorsalis pedis pulses are 2+ on the right side, and 2+ on the left side.        Posterior tibial pulses are 2+ on the right side, and 2+ on the left side.   Right radial cath site well healed without erythema or echymosis, palpable proximal and distal pulses, good capillary refill, no numbness or tingling, no hematomas, good motor function of hand "   Pulmonary/Chest: Effort normal. No respiratory distress. He has wheezes in the right upper field, the right lower field, the left upper field, the left middle field and the left lower field. He has no rales.   Inspiratory wheezes throughout, intermittent scattered expiratory wheezes present as well.   Abdominal: Soft. Bowel sounds are normal. He exhibits no distension and no ascites. There is no splenomegaly or hepatomegaly. There is no tenderness.   Musculoskeletal: Normal range of motion. He exhibits no edema.   Neurological: He is alert and oriented to person, place, and time.   Skin: Skin is warm, dry and intact. No bruising, no ecchymosis and no petechiae noted. He is not diaphoretic. No erythema. No pallor.   Psychiatric: He has a normal mood and affect. His speech is normal and behavior is normal. Judgment and thought content normal. Cognition and memory are normal.   Nursing note and vitals reviewed.        ECG 12 Lead  Date/Time: 9/14/2018 8:57 AM  Performed by: MAKEDA NELSON  Authorized by: MAKEDA NELSON   Comparison: compared with previous ECG   Similar to previous ECG  Rhythm: sinus bradycardia  Rate: bradycardic  BPM: 55  Clinical impression: abnormal ECG  Comments: Inverted t-waves: II, III, avF- inferior infarct age indeterminant    Indication: CAD            Assessment:       Diagnosis Plan   1. Acute MI, inferior wall (CMS/HCC)  Holter Monitor - 48 Hour    Ambulatory Referral to Cardiac Rehab   2. Status post insertion of drug-eluting stent into right coronary artery for coronary artery disease  Holter Monitor - 48 Hour    Ambulatory Referral to Cardiac Rehab   3. Cardiomyopathy, unspecified type (CMS/HCC)     4. Heart palpitations  Holter Monitor - 48 Hour   5. Dizziness  Holter Monitor - 48 Hour   6. Tobacco abuse     7. Tricuspid valve insufficiency, unspecified etiology         Plan:     Coronary Artery Disease  Assessment  • There is a new diagnosis of stable angina in the past 12  months    Plan  • Lifestyle modifications discussed include adhering to a heart healthy diet, avoidance of tobacco products, maintenance of a healthy weight, medication compliance, regular exercise and regular monitoring of cholesterol and blood pressure    Subjective - Objective  • There is a history of past MI on or around 8/20/2018  • There has been a previous stent procedure using CARRIE on or around 8/20/2018  • Current antiplatelet therapy includes aspirin 81 mg and clopidogrel 75 mg  • The patient qualifies for cardiac rehabilitation, and has been referred to cardiac rehab      2. Heart Failure  Assessment  • NYHA class I - There is no limitation of physical activity. Physical activity does not cause fatigue, palpitations or shortness of breath.  • The patient was newly diagnosed with heart failure within the past 12 months  • ACE inhibitor not prescribed for medical reasons  • Beta blocker not prescribed for medical reasons  • The most recent ejection fraction is 70%  • The left ventricle was last assessed on 8/20/2018  • Patient's EF initally with STEMI was 30% on ventriculogram. After stent placed TTE revealed a normal LV function with EF of 70%.     Plan  • The patient has received heart failure education on the following topics: dietary sodium restriction, smoking cessation, symptom management, physical activity, weight monitoring and minimizing alcohol intake    Subjective/Objective  • The patient reports dyspnea    • Physical exam findings negative for rales, peripheral edema, elevated JVP, S3 gallop, S4 gallop, hepatomegaly and ascites.      2. Ischemia cardiomyopathy: Initially decreased LV function to 30% on ventriculogram.  Echocardiogram after cath showed LV function returned to normal. EF 70%. Patient was unable to start on beta blocker or low dose ACE inhibitor due to bradycardia and mild hypotension with lightheadedness in hospital.     3. Palpitations: Report sensation of more frequent skipped  beats since his STEMI and stent placement.  Sounds like these may just be PVCs, but I will order a 48 hour Holter monitor to evaluate.     4. Dizziness: Intermittent spells for years. I will order 48 hour holter monitor to see if there are any arrhythmias contributing.     5. Tobacco use: Quit smoking for 1 week then started using e-cigarette. Not using Nicotine patches as they are ineffective. I educated on the importance of no smoking at all.     6. Tricupsid regurgitation: Mild on 8/20/18 echo.     7.  I advised the patient to follow-up with his PCP in regards to his cold symptoms and wheezing.  He may need a workup for COPD.  I also advised the patient to buy a blood pressure cuff for his arm to monitor his blood pressures at home and bring in the log to his next visit. Will need repeat labs including a fasting lipid panel and CMP with new meds in 3 months with PCP. Patient's HDL low at 25. I educated on the importance of regular exercise and recommended that he participate in cardiac rehab.     8. Follow up with Dr. Leyva in 4 week post cath visit, unless otherwise needed sooner.  I advised the patient to call our office with any questions or concerns.             Your medication list          Accurate as of 9/14/18 12:08 PM. If you have any questions, ask your nurse or doctor.               START taking these medications      Instructions Last Dose Given Next Dose Due   nitroglycerin 0.4 MG SL tablet  Commonly known as:  NITROSTAT  Started by:  VARINDER Koenig      1 under the tongue as needed for angina, may repeat q5mins for up three doses          CONTINUE taking these medications      Instructions Last Dose Given Next Dose Due   aspirin 81 MG chewable tablet      Chew 1 tablet Daily.       atorvastatin 40 MG tablet  Commonly known as:  LIPITOR      Take 1 tablet by mouth Every Night.       clopidogrel 75 MG tablet  Commonly known as:  PLAVIX      Take 1 tablet by mouth Daily.       tamsulosin 0.4 MG  capsule 24 hr capsule  Commonly known as:  FLOMAX      Take 1 capsule by mouth Every Night.          STOP taking these medications    nicotine 14 MG/24HR patch  Commonly known as:  NICODERM CQ  Stopped by:  VARINDER Keonig        nicotine 7 MG/24HR patch  Commonly known as:  NICODERM CQ  Stopped by:  VARINDER Koenig              Where to Get Your Medications      [facility] [place] AT [facility] [phone] [facility] [phone] [address], [place] [id number]    Phone:  [phone]   · nitroglycerin 0.4 MG SL tablet         The above medication changes may not have been made by this provider.  Medication list was updated to reflect medications patient is currently taking including medication changes and discontinuations made by other healthcare providers.       I have reviewed this case with Denice Forman PA-C . It has been a pleasure to participate in this patient's care. Please feel free to contact me with any questions or concerns.     VARINDER Koenig  09/14/2018       Dictated utilizing Dragon Dictation System.

## 2018-09-17 ENCOUNTER — TELEPHONE (OUTPATIENT)
Dept: CARDIAC REHAB | Facility: HOSPITAL | Age: 63
End: 2018-09-17

## 2018-09-17 NOTE — TELEPHONE ENCOUNTER
Pt returned my call.  He is interested in cardiac rehab, but is currently fighting a cold and he is due to have a Holter monitor placed this Friday for reported feelings of palpitations that caused him to return to ER.  Offered to schedule him out a bit so he would have time to have the Holter results read.  Pt prefers to call us back and make appt then.

## 2018-09-17 NOTE — TELEPHONE ENCOUNTER
Called pt and left voicemail for pt to return my call to schedule outpatient phase II cardiac rehab.

## 2018-09-21 ENCOUNTER — HOSPITAL ENCOUNTER (OUTPATIENT)
Dept: CARDIOLOGY | Facility: HOSPITAL | Age: 63
Discharge: HOME | End: 2018-09-21
Admitting: NURSE PRACTITIONER

## 2018-09-21 PROCEDURE — 93226 XTRNL ECG REC<48 HR SCAN A/R: CPT

## 2018-09-21 PROCEDURE — 93225 XTRNL ECG REC<48 HRS REC: CPT

## 2018-09-24 ENCOUNTER — READMISSION MANAGEMENT (OUTPATIENT)
Dept: CALL CENTER | Facility: HOSPITAL | Age: 63
End: 2018-09-24

## 2018-09-24 PROCEDURE — 93227 XTRNL ECG REC<48 HR R&I: CPT | Performed by: INTERNAL MEDICINE

## 2018-09-24 NOTE — OUTREACH NOTE
AMI Week 3 Survey      Responses   Facility patient discharged from?  [facility]   Does the patient have one of the following disease processes/diagnoses(primary or secondary)?  Acute MI (STEMI,NSTEMI)   Week 3 attempt successful?  Yes   Call start time  0942   Call end time  0946   General alerts for this patient  No lifting greater than 10 pounds right arm for one week   Discharge diagnosis  Acute MI, inferior wall, Tobacco abuse, heart cath (R Arm) with stent placement   Is patient permission given to speak with other caregiver?  No   Medication alerts for this patient  plavix   Meds reviewed with patient/caregiver?  Yes   Is the patient having any side effects they believe may be caused by any medication additions or changes?  No   Does the patient have all prescriptions related to this admission filled (includes statins,anticoagulants,HTN meds,anti-arrhythmia meds)  Yes   Is the patient taking all medications as directed (includes completed medication regime)?  Yes   Comments regarding appointments  Cardiac Rehab has been ordered and he has spoke with them .   Does the patient have a primary care provider?   Yes   Does the patient have an appointment with their PCP,cardiologist,or clinic within 7 days of discharge?  Yes   Has the patient kept scheduled appointments due by today?  Yes   Comments  He has seen his PCP. He sees the cardiologist on Tuesday.   Has home health visited the patient within 72 hours of discharge?  N/A   Psychosocial issues?  No   Comments  Pt quit smoking. Back to work. Trying to eat better.   Did the patient receive a copy of their discharge instructions?  Yes   Nursing interventions  Reviewed instructions with patient   What is the patient's perception of their health status since discharge?  Improving   Nursing interventions  Nurse provided patient education   Is the patient/caregiver able to teach back signs and symptoms of when to call for help immediately:  Sudden chest discomfort,  Sudden discomfort in arms, back, neck or jaw, Dizziness or lightheadedness, Shortness of breath at any time, Sudden sweating or clammy skin, Nausea or vomiting, Irregular or rapid heart rate   Nursing interventions  Nurse provided patient education   Is the pateint /caregiver able to teach back the importance of cardiac rehab?  Yes   Nursing interventions  Provided education on importance of cardiac rehab   Is the patient/caregiver able to teach back lifestyle changes to help prevent MIs  Quit smoking, Reducing stress, Regular exercise as approved by provider, Heart healthy diet   Is the patient/caregiver able to teach back ways to prevent a second heart attack:  Take medications, Participate in Cardiac Rehab, Follow up with MD   If the patient is a current smoker, are they able to teach back resources for cessation?  [phone]   Is the patient/caregiver able to teach back the hierarchy of who to call/visit for symptoms/problems? PCP, Specialist, Home health nurse, Urgent Care, ED, 911  Yes   Week 3 call completed?  Yes          Rachana Mosqueda RN

## 2018-09-25 ENCOUNTER — TELEPHONE (OUTPATIENT)
Dept: CARDIOLOGY | Facility: CLINIC | Age: 63
End: 2018-09-25

## 2018-09-26 NOTE — TELEPHONE ENCOUNTER
I called and spoke with the patient and notified him about his 48 Holter monitor results read by Dr. Cruz.  He had rare premature ventricular contractions and premature atrial contractions.  There were no episodes of ventricular tachycardia, no atrioventricular blocks noted sinoatrial node conduction was normal.  He had 3 episodes of atrial tachycardia of the longest lasting 4 beats with the fastest rate of 140 bpm.  There were 2 diary events noted, but the patient reported that he accidentally hit the button and a couple times when he was not having symptoms.  He did report that while he was wearing the Holter monitor he really didn't have symptoms of palpitations.  He reports that he has had a couple symptoms of palpitations since he is turned his Holter monitor.  Overall he is feeling pretty well, and he has not really had any other associated symptoms and reports that he will discuss this further with Dr. Leyva at his follow-up appointment with him on 10/1/18.

## 2018-10-01 ENCOUNTER — OFFICE VISIT (OUTPATIENT)
Dept: CARDIOLOGY | Facility: CLINIC | Age: 63
End: 2018-10-01

## 2018-10-01 VITALS
HEART RATE: 50 BPM | SYSTOLIC BLOOD PRESSURE: 110 MMHG | WEIGHT: 185 LBS | BODY MASS INDEX: 25.06 KG/M2 | DIASTOLIC BLOOD PRESSURE: 80 MMHG | HEIGHT: 72 IN

## 2018-10-01 DIAGNOSIS — I25.10 CORONARY ARTERY DISEASE INVOLVING NATIVE CORONARY ARTERY OF NATIVE HEART WITHOUT ANGINA PECTORIS: ICD-10-CM

## 2018-10-01 DIAGNOSIS — E78.5 DYSLIPIDEMIA: Primary | ICD-10-CM

## 2018-10-01 PROCEDURE — 99214 OFFICE O/P EST MOD 30 MIN: CPT | Performed by: INTERNAL MEDICINE

## 2018-10-01 PROCEDURE — 93000 ELECTROCARDIOGRAM COMPLETE: CPT | Performed by: INTERNAL MEDICINE

## 2018-10-01 NOTE — PROGRESS NOTES
Flavio Frost  1955  Date of Office Visit: 10/01/2018  Encounter Provider: Giovanny Leyva MD  Place of Service: [facility]      CHIEF COMPLAINT:  Coronary disease with prior inferior myocardial infarction      HISTORY OF PRESENT ILLNESS:  62 y.o. male with a history of hyperlipidemia, cancer, enlarged prostate, current smoker, no known cardiac history. He presented to the ED via EMS 8/20/18 with a complaint of chest pain that started when he woke up to use the restroom.  Per his report the pain was severe in intensity, central without radiation.  It was associated with diaphoresis and mild shortness of breath.  It was still ongoing when he arrived to the catheterization lab.  His EKG showed inferior ST elevation and sinus bradycardia.  He underwent coronary angiography with an occluded mid RCA.  He underwent a drug-eluting stent placed from the proximal to mid RCA.  This was a 3.0 x 38 mm Xience Lilo stent.  This was postdilated to 3.5 mm with a noncompliant balloon in the proximal to mid segment. He received multiple doses of intracoronary adenosine and nitroglycerin along with intracoronary Integrilin.  He tolerated this well.   His ejection fraction normalized on the echocardiogram.  Unfortunately, he was unable to tolerate low-dose metoprolol tartrate secondary to significant sinus bradycardia and lightheadedness.  He also had mild hypotension which precluded starting lisinopril therapy.  He does not have insurance and was moved over from Brilinta to Plavix therapy.   Since our last meeting, the patient has been doing well.  He denies any chest pain consistent with angina.  He states that his dyspnea on exertion has significantly improved since our last meeting.  He does occasionally have episodes where he is lightheaded and this does increase in intensity when he stands from a seated position.  He states that, most of the time, his blood pressure is in  the high 90s mmHg to very low 100 mmHg range when he has those issues.  He also states he may not be drinking enough water.            Review of Systems   Constitution: Negative for fever, weakness and malaise/fatigue.   HENT: Negative for nosebleeds and sore throat.    Eyes: Negative for blurred vision and double vision.   Cardiovascular: Negative for chest pain, claudication, palpitations and syncope.   Respiratory: Negative for cough, shortness of breath and snoring.    Endocrine: Negative for cold intolerance, heat intolerance and polydipsia.   Skin: Negative for itching, poor wound healing and rash.   Musculoskeletal: Negative for joint pain, joint swelling, muscle weakness and myalgias.   Gastrointestinal: Negative for abdominal pain, melena, nausea and vomiting.   Neurological: Positive for light-headedness. Negative for loss of balance, seizures and vertigo.   Psychiatric/Behavioral: Negative for altered mental status and depression.       Past Medical History:   Diagnosis Date   • BPH (benign prostatic hyperplasia)    • Bradycardia    • Cancer (CMS/HCC)     skin cancer   • Coronary artery disease    • Enlarged prostate    • Hyperlipidemia    • Hypotension    • Myocardial infarction (CMS/HCC)        The following portions of the patient's history were reviewed and updated as appropriate: Social history , Family history and Surgical history     Current Outpatient Prescriptions on File Prior to Visit   Medication Sig Dispense Refill   • albuterol (PROVENTIL HFA;VENTOLIN HFA) 108 (90 Base) MCG/ACT inhaler Inhale 2 puffs Every 6 (Six) Hours As Needed for Wheezing. 1 inhaler 0   • aspirin 81 MG chewable tablet Chew 1 tablet Daily. 30 tablet 11   • atorvastatin (LIPITOR) 40 MG tablet Take 1 tablet by mouth Every Night. 30 tablet 6   • clopidogrel (PLAVIX) 75 MG tablet Take 1 tablet by mouth Daily. 30 tablet 11   • nitroglycerin (NITROSTAT) 0.4 MG SL tablet 1 under the tongue as needed for angina, may repeat q5mins  "for up three doses 45 tablet 11   • tamsulosin (FLOMAX) 0.4 MG capsule 24 hr capsule Take 1 capsule by mouth Every Night.     • [DISCONTINUED] doxycycline (MONODOX) 100 MG capsule Take 1 capsule by mouth 2 (Two) Times a Day. 20 capsule 0     No current facility-administered medications on file prior to visit.        Allergies   Allergen Reactions   • Penicillins Hives   • Sertraline Anxiety     ?halllucinations       Vitals:    10/01/18 1427   BP: 110/80   Pulse: 50   Weight: 83.9 kg (185 lb)   Height: 182.9 cm (72\")     Physical Exam   Constitutional: He is oriented to person, place, and time. He appears well-developed and well-nourished.   HENT:   Head: Normocephalic and atraumatic.   Eyes: Conjunctivae and EOM are normal. No scleral icterus.   Neck: Normal range of motion. Neck supple. Normal carotid pulses, no hepatojugular reflux and no JVD present. Carotid bruit is not present. No tracheal deviation present. No thyromegaly present.   Cardiovascular: Normal rate and regular rhythm.  Exam reveals no gallop and no friction rub.    No murmur heard.  Pulses:       Carotid pulses are 2+ on the right side, and 2+ on the left side.       Radial pulses are 2+ on the right side, and 2+ on the left side.        Femoral pulses are 2+ on the right side, and 2+ on the left side.       Dorsalis pedis pulses are 2+ on the right side, and 2+ on the left side.        Posterior tibial pulses are 2+ on the right side, and 2+ on the left side.   Pulmonary/Chest: Breath sounds normal. No respiratory distress. He has no decreased breath sounds. He has no wheezes. He has no rhonchi. He has no rales. He exhibits no tenderness.   Abdominal: Soft. Bowel sounds are normal. He exhibits no distension. There is no tenderness. There is no rebound.   Musculoskeletal: He exhibits no edema or deformity.   Neurological: He is alert and oriented to person, place, and time. He has normal strength. No sensory deficit.   Skin: No rash noted. No " erythema.   Psychiatric: He has a normal mood and affect. His behavior is normal.     No components found for: CBC  No results found for: CMP  No components found for: LIPID  No results found for: BMP      ECG 12 Lead  Date/Time: 10/1/2018 4:05 PM  Performed by: ARTEMIO ZARAGOZA  Authorized by: ARTEMIO ZARAGOZA   Comparison: compared with previous ECG from 9/14/2018  Similar to previous ECG  Rhythm: sinus rhythm  Rate: normal  QRS axis: normal  Clinical impression: non-specific ECG  Comments: Nonspecific T-wave abnormalities inferior leads.               8/20/18  Conclusions:   1. Left main: Normal  2. LAD: Discrete 70-80% apical stenosis  3. LCX: 60% proximal OM1 stenosis  4. RCA: Acutely occluded in the midsegment  5.  Moderately depressed left ventricular systolic function.  Ejection fraction 30%.  Basal to mid inferior wall akinesis.  Mild anterolateral hypokinesis  6.  Successful PCI and aspiration thrombectomy of the RCA.  3.0 x 38 mm Xience Lilo drug-eluting stent deployed across the proximal to mid segment and postdilated in the proximal to mid segment with a 3.5 x 20 mm noncompliant trek balloon to high pressure     DISCUSSION/SUMMARYThe patient is a 62-year-old male with a medical history of coronary artery disease with a myocardial infarction in 08/2018.  He underwent percutaneous coronary intervention to the proximal to mid-right coronary artery and tolerated this well.  His ejection fraction is normal.  Since that time, he has quit smoking and overall is doing well other than mild lightheadedness and borderline hypotension.    1. Coronary disease with prior inferior myocardial infarction.  - Status post percutaneous coronary intervention of the proximal to mid-right coronary artery with drug-eluting stent.  - Continue aspirin and Plavix.  Plavix to continue for one year.  - Continue his current statin dose.  - He is not on ACE inhibitor or beta-blockade secondary to  hypotension.  2. Hyperlipidemia as above.  Continue atorvastatin at current dose.  3. Lightheadedness.  - I have encouraged him to liberalize his water intake and wear compression stockings.  I would not recommend midodrine or other medications such as that currently.    Coronary Artery Disease  Assessment  • The patient has no angina    Plan  • Lifestyle modifications discussed include adhering to a heart healthy diet, avoidance of tobacco products and medication compliance    Subjective - Objective  • There has been a previous stent procedure using CARRIE on or around 8/20/2018  • Current antiplatelet therapy includes aspirin 81 mg and clopidogrel 75 mg

## 2018-10-02 ENCOUNTER — READMISSION MANAGEMENT (OUTPATIENT)
Dept: CALL CENTER | Facility: HOSPITAL | Age: 63
End: 2018-10-02

## 2018-10-02 NOTE — OUTREACH NOTE
AMI Week 4 Survey      Responses   Facility patient discharged from?  [facility]   Does the patient have one of the following disease processes/diagnoses(primary or secondary)?  Acute MI (STEMI,NSTEMI)   Week 4 attempt successful?  No          Amna Chakraborty RN

## 2018-10-03 ENCOUNTER — TELEPHONE (OUTPATIENT)
Dept: CARDIAC REHAB | Facility: HOSPITAL | Age: 63
End: 2018-10-03

## 2018-10-03 NOTE — TELEPHONE ENCOUNTER
Called pt to provide appointment for outpatient cardiac rehab.  Long d/w pt about the purpose and benefits of program.  Pt would like to talk with his cardiologist about whether he needs this or not.  Pt says he works a very physical job and long hours.  Not sure if he could make it to sessions given his work schedule.  He also lives in [facility] is a bit of a drive to get here.  He will call us back if he wishes to schedule.

## 2018-10-10 ENCOUNTER — TELEPHONE (OUTPATIENT)
Dept: CARDIAC REHAB | Facility: HOSPITAL | Age: 63
End: 2018-10-10

## 2018-10-10 NOTE — TELEPHONE ENCOUNTER
Left message for pt to please call me back with his decision whether to attend our cardiac rehab program or not.

## 2019-03-22 RX ORDER — ATORVASTATIN CALCIUM 40 MG/1
TABLET, FILM COATED ORAL
Qty: 30 TABLET | Refills: 5 | Status: SHIPPED | OUTPATIENT
Start: 2019-03-22 | End: 2019-08-19 | Stop reason: SDUPTHER

## 2019-05-10 ENCOUNTER — OFFICE VISIT (OUTPATIENT)
Dept: CARDIOLOGY | Facility: CLINIC | Age: 64
End: 2019-05-10

## 2019-05-10 VITALS
OXYGEN SATURATION: 98 % | DIASTOLIC BLOOD PRESSURE: 96 MMHG | SYSTOLIC BLOOD PRESSURE: 144 MMHG | BODY MASS INDEX: 25.81 KG/M2 | WEIGHT: 190.6 LBS | HEART RATE: 51 BPM | HEIGHT: 72 IN

## 2019-05-10 DIAGNOSIS — E78.5 HYPERLIPIDEMIA, UNSPECIFIED HYPERLIPIDEMIA TYPE: ICD-10-CM

## 2019-05-10 DIAGNOSIS — I10 ESSENTIAL HYPERTENSION: ICD-10-CM

## 2019-05-10 DIAGNOSIS — R53.82 CHRONIC FATIGUE: ICD-10-CM

## 2019-05-10 DIAGNOSIS — I25.10 CORONARY ARTERY DISEASE INVOLVING NATIVE CORONARY ARTERY OF NATIVE HEART WITHOUT ANGINA PECTORIS: Primary | ICD-10-CM

## 2019-05-10 DIAGNOSIS — R42 DIZZINESS: ICD-10-CM

## 2019-05-10 PROCEDURE — 93000 ELECTROCARDIOGRAM COMPLETE: CPT | Performed by: NURSE PRACTITIONER

## 2019-05-10 PROCEDURE — 99214 OFFICE O/P EST MOD 30 MIN: CPT | Performed by: NURSE PRACTITIONER

## 2019-05-29 ENCOUNTER — APPOINTMENT (OUTPATIENT)
Dept: SLEEP MEDICINE | Facility: HOSPITAL | Age: 64
End: 2019-05-29

## 2019-08-19 ENCOUNTER — TELEPHONE (OUTPATIENT)
Dept: CARDIOLOGY | Facility: CLINIC | Age: 64
End: 2019-08-19

## 2019-08-19 DIAGNOSIS — E78.2 MIXED HYPERLIPIDEMIA: Primary | ICD-10-CM

## 2019-08-19 RX ORDER — CLOPIDOGREL BISULFATE 75 MG/1
75 TABLET ORAL DAILY
Qty: 90 TABLET | Refills: 0 | Status: SHIPPED | OUTPATIENT
Start: 2019-08-19 | End: 2019-11-20 | Stop reason: SDUPTHER

## 2019-08-19 RX ORDER — ATORVASTATIN CALCIUM 40 MG/1
40 TABLET, FILM COATED ORAL NIGHTLY
Qty: 90 TABLET | Refills: 0 | Status: SHIPPED | OUTPATIENT
Start: 2019-08-19 | End: 2019-12-20

## 2019-08-19 NOTE — TELEPHONE ENCOUNTER
Called pt and informed him labs were put in computer and to go over to outpatient lab to blood drawn. He stated he would get this done this week.    VINICIUS Vazquez

## 2019-08-19 NOTE — TELEPHONE ENCOUNTER
Pt would like a reminder at appt for next refills on atorvastatin and clopidogrel. Requested that I make a note in chart. Pt also due for labs. Please put in orders.    VINICIUS Vazquez

## 2019-11-20 RX ORDER — CLOPIDOGREL BISULFATE 75 MG/1
TABLET ORAL
Qty: 90 TABLET | Refills: 3 | Status: SHIPPED | OUTPATIENT
Start: 2019-11-20 | End: 2020-06-17

## 2019-12-20 RX ORDER — ATORVASTATIN CALCIUM 40 MG/1
TABLET, FILM COATED ORAL
Qty: 30 TABLET | Refills: 0 | Status: SHIPPED | OUTPATIENT
Start: 2019-12-20 | End: 2020-01-20

## 2020-01-20 RX ORDER — ATORVASTATIN CALCIUM 40 MG/1
TABLET, FILM COATED ORAL
Qty: 90 TABLET | Refills: 0 | Status: SHIPPED | OUTPATIENT
Start: 2020-01-20 | End: 2020-03-16 | Stop reason: SDUPTHER

## 2020-03-16 RX ORDER — ATORVASTATIN CALCIUM 40 MG/1
40 TABLET, FILM COATED ORAL NIGHTLY
Qty: 30 TABLET | Refills: 0 | Status: SHIPPED | OUTPATIENT
Start: 2020-03-16 | End: 2020-05-22 | Stop reason: SDUPTHER

## 2020-03-19 ENCOUNTER — TELEPHONE (OUTPATIENT)
Dept: CARDIOLOGY | Facility: CLINIC | Age: 65
End: 2020-03-19

## 2020-03-19 NOTE — TELEPHONE ENCOUNTER
I saw in overdue results that patient was supposed to have a CMP/Lipid panel ordered on 8/19/19.  I left a message for pt to see if he has had this done.  Madisyn

## 2020-03-28 ENCOUNTER — NURSE TRIAGE (OUTPATIENT)
Dept: CALL CENTER | Facility: HOSPITAL | Age: 65
End: 2020-03-28

## 2020-03-28 NOTE — TELEPHONE ENCOUNTER
"States he 2018 he had a stent placed after a MI and has been seeing Dr. Leyva since then. States for the last week he has had palpitations. He did have a holter monitor last year.     Checked his BP and it is 163/113. States he can feel his heart beating in his ears like he did prior to his MI. States he needs to go to ER but is asking how to do that with the virus that is going on.     Reason for Disposition  • [1] Systolic BP  >= 160 OR Diastolic >= 100 AND [2] cardiac or neurologic symptoms (e.g., chest pain, difficulty breathing, unsteady gait, blurred vision)    Additional Information  • Negative: Difficult to awaken or acting confused (e.g., disoriented, slurred speech)  • Negative: Severe difficulty breathing (e.g., struggling for each breath, speaks in single words)  • Negative: [1] Weakness of the face, arm or leg on one side of the body AND [2] new onset  • Negative: [1] Numbness (i.e., loss of sensation) of the face, arm or leg on one side of the body AND [2] new onset  • Negative: [1] Chest pain lasts > 5 minutes AND [2] history of heart disease  (i.e., heart attack, bypass surgery, angina, angioplasty, CHF)  • Negative: [1] Chest pain AND [2] took nitrogylcerin AND [3] pain was not relieved  • Negative: Sounds like a life-threatening emergency to the triager  • Negative: Symptom is main concern  (e.g., headache, chest pain)  • Negative: Low blood pressure is main concern  • Negative: [1] Pregnant > 20 weeks (or postpartum < 6 weeks) AND [2] new hand or face swelling    Answer Assessment - Initial Assessment Questions  1. BLOOD PRESSURE: \"What is the blood pressure?\" \"Did you take at least two measurements 5 minutes apart?\"      Se note  2. ONSET: \"When did you take your blood pressure?\"      n/a  3. HOW: \"How did you obtain the blood pressure?\" (e.g., visiting nurse, automatic home BP monitor)      n/a  4. HISTORY: \"Do you have a history of high blood pressure?\"      n/a  5. MEDICATIONS: \"Are you " "taking any medications for blood pressure?\" \"Have you missed any doses recently?\"      n/a  6. OTHER SYMPTOMS: \"Do you have any symptoms?\" (e.g., headache, chest pain, blurred vision, difficulty breathing, weakness)      n/a  7. PREGNANCY: \"Is there any chance you are pregnant?\" \"When was your last menstrual period?\"      n/a    Protocols used: HIGH BLOOD PRESSURE-ADULT-AH      "

## 2020-05-20 ENCOUNTER — TELEPHONE (OUTPATIENT)
Dept: CARDIOLOGY | Facility: CLINIC | Age: 65
End: 2020-05-20

## 2020-05-20 NOTE — TELEPHONE ENCOUNTER
Pt called back. He is overdue for lipid and CMP, his last was in 2018. He said he is fearful of coming and getting labwork done. And wants to know if he can stop his atorvastatin.    He is also due for a 1 year follow up as well.    Can we do him as a Telehealth appt and extend his atorvastatin for 90 days and then have him do labs?  Please advise.    Thanks,  Mellissa

## 2020-05-22 RX ORDER — ATORVASTATIN CALCIUM 40 MG/1
40 TABLET, FILM COATED ORAL NIGHTLY
Qty: 90 TABLET | Refills: 0 | Status: SHIPPED | OUTPATIENT
Start: 2020-05-22 | End: 2020-08-20

## 2020-05-31 ENCOUNTER — HOSPITAL ENCOUNTER (EMERGENCY)
Facility: HOSPITAL | Age: 65
Discharge: HOME | End: 2020-05-31
Attending: EMERGENCY MEDICINE | Admitting: EMERGENCY MEDICINE

## 2020-05-31 ENCOUNTER — APPOINTMENT (OUTPATIENT)
Dept: GENERAL RADIOLOGY | Facility: HOSPITAL | Age: 65
End: 2020-05-31

## 2020-05-31 VITALS
TEMPERATURE: 97.3 F | DIASTOLIC BLOOD PRESSURE: 91 MMHG | HEART RATE: 54 BPM | RESPIRATION RATE: 18 BRPM | SYSTOLIC BLOOD PRESSURE: 141 MMHG | BODY MASS INDEX: 25.73 KG/M2 | OXYGEN SATURATION: 96 % | WEIGHT: 190 LBS | HEIGHT: 72 IN

## 2020-05-31 DIAGNOSIS — R07.9 CHEST PAIN, UNSPECIFIED TYPE: Primary | ICD-10-CM

## 2020-05-31 LAB
ALBUMIN SERPL-MCNC: 4.2 G/DL (ref 3.5–5.2)
ALBUMIN/GLOB SERPL: 1.9 G/DL
ALP SERPL-CCNC: 61 U/L (ref 39–117)
ALT SERPL W P-5'-P-CCNC: 19 U/L (ref 1–41)
ANION GAP SERPL CALCULATED.3IONS-SCNC: 11.3 MMOL/L (ref 5–15)
AST SERPL-CCNC: 19 U/L (ref 1–40)
BASOPHILS # BLD AUTO: 0.02 10*3/MM3 (ref 0–0.2)
BASOPHILS NFR BLD AUTO: 0.3 % (ref 0–1.5)
BILIRUB SERPL-MCNC: 0.4 MG/DL (ref 0.2–1.2)
BUN BLD-MCNC: 15 MG/DL (ref 8–23)
BUN/CREAT SERPL: 17 (ref 7–25)
CALCIUM SPEC-SCNC: 9.2 MG/DL (ref 8.6–10.5)
CHLORIDE SERPL-SCNC: 103 MMOL/L (ref 98–107)
CO2 SERPL-SCNC: 25.7 MMOL/L (ref 22–29)
CREAT BLD-MCNC: 0.88 MG/DL (ref 0.76–1.27)
DEPRECATED RDW RBC AUTO: 45.5 FL (ref 37–54)
EOSINOPHIL # BLD AUTO: 0.05 10*3/MM3 (ref 0–0.4)
EOSINOPHIL NFR BLD AUTO: 0.7 % (ref 0.3–6.2)
ERYTHROCYTE [DISTWIDTH] IN BLOOD BY AUTOMATED COUNT: 14.1 % (ref 12.3–15.4)
GFR SERPL CREATININE-BSD FRML MDRD: 87 ML/MIN/1.73
GLOBULIN UR ELPH-MCNC: 2.2 GM/DL
GLUCOSE BLD-MCNC: 99 MG/DL (ref 65–99)
HCT VFR BLD AUTO: 43.5 % (ref 37.5–51)
HGB BLD-MCNC: 14.6 G/DL (ref 13–17.7)
HOLD SPECIMEN: NORMAL
HOLD SPECIMEN: NORMAL
IMM GRANULOCYTES # BLD AUTO: 0.03 10*3/MM3 (ref 0–0.05)
IMM GRANULOCYTES NFR BLD AUTO: 0.4 % (ref 0–0.5)
LIPASE SERPL-CCNC: 21 U/L (ref 13–60)
LYMPHOCYTES # BLD AUTO: 1.66 10*3/MM3 (ref 0.7–3.1)
LYMPHOCYTES NFR BLD AUTO: 22.2 % (ref 19.6–45.3)
MCH RBC QN AUTO: 29.7 PG (ref 26.6–33)
MCHC RBC AUTO-ENTMCNC: 33.6 G/DL (ref 31.5–35.7)
MCV RBC AUTO: 88.4 FL (ref 79–97)
MONOCYTES # BLD AUTO: 0.51 10*3/MM3 (ref 0.1–0.9)
MONOCYTES NFR BLD AUTO: 6.8 % (ref 5–12)
NEUTROPHILS # BLD AUTO: 5.21 10*3/MM3 (ref 1.7–7)
NEUTROPHILS NFR BLD AUTO: 69.6 % (ref 42.7–76)
NRBC BLD AUTO-RTO: 0 /100 WBC (ref 0–0.2)
PLATELET # BLD AUTO: 202 10*3/MM3 (ref 140–450)
PMV BLD AUTO: 10.2 FL (ref 6–12)
POTASSIUM BLD-SCNC: 4.3 MMOL/L (ref 3.5–5.2)
PROT SERPL-MCNC: 6.4 G/DL (ref 6–8.5)
RBC # BLD AUTO: 4.92 10*6/MM3 (ref 4.14–5.8)
SODIUM BLD-SCNC: 140 MMOL/L (ref 136–145)
TROPONIN T SERPL-MCNC: <0.01 NG/ML (ref 0–0.03)
TROPONIN T SERPL-MCNC: <0.01 NG/ML (ref 0–0.03)
WBC NRBC COR # BLD: 7.48 10*3/MM3 (ref 3.4–10.8)
WHOLE BLOOD HOLD SPECIMEN: NORMAL
WHOLE BLOOD HOLD SPECIMEN: NORMAL

## 2020-05-31 PROCEDURE — 99284 EMERGENCY DEPT VISIT MOD MDM: CPT

## 2020-05-31 PROCEDURE — 93010 ELECTROCARDIOGRAM REPORT: CPT | Performed by: INTERNAL MEDICINE

## 2020-05-31 PROCEDURE — 80053 COMPREHEN METABOLIC PANEL: CPT | Performed by: EMERGENCY MEDICINE

## 2020-05-31 PROCEDURE — 84484 ASSAY OF TROPONIN QUANT: CPT | Performed by: EMERGENCY MEDICINE

## 2020-05-31 PROCEDURE — 93005 ELECTROCARDIOGRAM TRACING: CPT | Performed by: EMERGENCY MEDICINE

## 2020-05-31 PROCEDURE — 85025 COMPLETE CBC W/AUTO DIFF WBC: CPT | Performed by: EMERGENCY MEDICINE

## 2020-05-31 PROCEDURE — 93005 ELECTROCARDIOGRAM TRACING: CPT

## 2020-05-31 PROCEDURE — 83690 ASSAY OF LIPASE: CPT | Performed by: EMERGENCY MEDICINE

## 2020-05-31 PROCEDURE — 71045 X-RAY EXAM CHEST 1 VIEW: CPT

## 2020-05-31 RX ORDER — SODIUM CHLORIDE 0.9 % (FLUSH) 0.9 %
10 SYRINGE (ML) INJECTION AS NEEDED
Status: DISCONTINUED | OUTPATIENT
Start: 2020-05-31 | End: 2020-05-31 | Stop reason: HOSPADM

## 2020-05-31 RX ORDER — ASPIRIN 81 MG/1
81 TABLET, CHEWABLE ORAL ONCE
Status: COMPLETED | OUTPATIENT
Start: 2020-05-31 | End: 2020-05-31

## 2020-05-31 RX ADMIN — ASPIRIN 81 MG: 81 TABLET, CHEWABLE ORAL at 14:55

## 2020-06-03 ENCOUNTER — TELEPHONE (OUTPATIENT)
Dept: CARDIOLOGY | Facility: CLINIC | Age: 65
End: 2020-06-03

## 2020-06-04 ENCOUNTER — OFFICE VISIT (OUTPATIENT)
Dept: CARDIOLOGY | Facility: CLINIC | Age: 65
End: 2020-06-04

## 2020-06-04 VITALS
HEIGHT: 72 IN | SYSTOLIC BLOOD PRESSURE: 148 MMHG | BODY MASS INDEX: 26.28 KG/M2 | DIASTOLIC BLOOD PRESSURE: 88 MMHG | WEIGHT: 194 LBS | HEART RATE: 59 BPM

## 2020-06-04 DIAGNOSIS — I25.10 CORONARY ARTERY DISEASE INVOLVING NATIVE CORONARY ARTERY OF NATIVE HEART WITHOUT ANGINA PECTORIS: ICD-10-CM

## 2020-06-04 DIAGNOSIS — E78.5 HYPERLIPIDEMIA, UNSPECIFIED HYPERLIPIDEMIA TYPE: ICD-10-CM

## 2020-06-04 DIAGNOSIS — Z95.5 STATUS POST INSERTION OF DRUG-ELUTING STENT INTO RIGHT CORONARY ARTERY FOR CORONARY ARTERY DISEASE: ICD-10-CM

## 2020-06-04 DIAGNOSIS — J43.9 PULMONARY EMPHYSEMA, UNSPECIFIED EMPHYSEMA TYPE (HCC): ICD-10-CM

## 2020-06-04 DIAGNOSIS — R06.09 DOE (DYSPNEA ON EXERTION): Primary | ICD-10-CM

## 2020-06-04 DIAGNOSIS — I10 ESSENTIAL HYPERTENSION: ICD-10-CM

## 2020-06-04 PROCEDURE — 99214 OFFICE O/P EST MOD 30 MIN: CPT | Performed by: INTERNAL MEDICINE

## 2020-06-04 PROCEDURE — 93000 ELECTROCARDIOGRAM COMPLETE: CPT | Performed by: INTERNAL MEDICINE

## 2020-06-04 NOTE — PROGRESS NOTES
Flavio Frost  1955  Date of Office Visit: 06/04/20  Encounter Provider: Giovanny Leyva MD  Place of Service: [facility]      CHIEF COMPLAINT:  Coronary disease with prior inferior myocardial infarction  Chest pain  Dyspnea on exertion  History of tobacco abuse    HISTORY OF PRESENT ILLNESS:  64 y.o. male with a history of hyperlipidemia, cancer, enlarged prostate, current smoker, no known cardiac history. He presented to the ED via EMS 8/20/18 with a complaint of chest pain that started when he woke up to use the restroom.  Per his report the pain was severe in intensity, central without radiation.  It was associated with diaphoresis and mild shortness of breath.  It was still ongoing when he arrived to the catheterization lab.  His EKG showed inferior ST elevation and sinus bradycardia.  He underwent coronary angiography with an occluded mid RCA.  He underwent a drug-eluting stent placed from the proximal to mid RCA.  This was a 3.0 x 38 mm Xience Lilo stent.  This was postdilated to 3.5 mm with a noncompliant balloon in the proximal to mid segment. He received multiple doses of intracoronary adenosine and nitroglycerin along with intracoronary Integrilin.  He tolerated this well.   His ejection fraction normalized on the echocardiogram.  Unfortunately, he was unable to tolerate low-dose metoprolol tartrate secondary to significant sinus bradycardia and lightheadedness.  He also had mild hypotension which precluded starting lisinopril therapy.  He does not have insurance and was moved over from Brilinta to Plavix therapy.     He was in the emergency room as he stated that he was sitting, watching TV, and had the onset of nausea, vertiginous symptoms, and shortness of breath.  He was worried that he might be having a heart attack.  He went to bed and when he was supine, he noticed worsening of his vertiginous symptoms and came in for evaluation.  His EKG  and cardiac biomarkers were normal.  He had troponin x2 that was negative and was not felt to need admitted.    He does state that he continues to have dyspnea with mild levels of exertion.  The dyspnea is moderate in intensity and does resolve with rest.  This is not associated with chest pain or pressure.  He has not followed with pulmonology.          Review of Systems   Constitution: Negative for fever and malaise/fatigue.   HENT: Negative for nosebleeds and sore throat.    Eyes: Negative for blurred vision and double vision.   Cardiovascular: Positive for dyspnea on exertion. Negative for chest pain, claudication, palpitations and syncope.   Respiratory: Negative for cough, shortness of breath and snoring.    Endocrine: Negative for cold intolerance, heat intolerance and polydipsia.   Skin: Negative for itching, poor wound healing and rash.   Musculoskeletal: Negative for joint pain, joint swelling, muscle weakness and myalgias.   Gastrointestinal: Negative for abdominal pain, melena, nausea and vomiting.   Neurological: Positive for light-headedness. Negative for loss of balance, seizures, vertigo and weakness.   Psychiatric/Behavioral: Negative for altered mental status and depression.       Past Medical History:   Diagnosis Date   • BPH (benign prostatic hyperplasia)    • Bradycardia    • Cancer (CMS/HCC)     skin cancer   • Coronary artery disease    • Enlarged prostate    • Hyperlipidemia    • Hypotension    • Myocardial infarction (CMS/HCC)        The following portions of the patient's history were reviewed and updated as appropriate: Social history , Family history and Surgical history     Current Outpatient Medications on File Prior to Visit   Medication Sig Dispense Refill   • albuterol (PROVENTIL HFA;VENTOLIN HFA) 108 (90 Base) MCG/ACT inhaler Inhale 2 puffs Every 6 (Six) Hours As Needed for Wheezing. 1 inhaler 0   • aspirin 81 MG chewable tablet Chew 1 tablet Daily. 30 tablet 11   • atorvastatin  "(LIPITOR) 40 MG tablet Take 1 tablet by mouth Every Night. 90 tablet 0   • clopidogrel (PLAVIX) 75 MG tablet TAKE ONE TABLET BY MOUTH DAILY 90 tablet 3   • nitroglycerin (NITROSTAT) 0.4 MG SL tablet 1 under the tongue as needed for angina, may repeat q5mins for up three doses 45 tablet 11   • tamsulosin (FLOMAX) 0.4 MG capsule 24 hr capsule Take 1 capsule by mouth Every Night.       No current facility-administered medications on file prior to visit.        Allergies   Allergen Reactions   • Penicillins Hives   • Sertraline Anxiety     ?halllucinations       Vitals:    06/04/20 1153   BP: 148/88   Pulse: 59   Weight: 88 kg (194 lb)   Height: 182.9 cm (72\")     Physical Exam   Constitutional: He is oriented to person, place, and time. He appears well-developed and well-nourished.   HENT:   Head: Normocephalic and atraumatic.   Eyes: Conjunctivae and EOM are normal. No scleral icterus.   Neck: Normal range of motion. Neck supple. Normal carotid pulses, no hepatojugular reflux and no JVD present. Carotid bruit is not present. No tracheal deviation present. No thyromegaly present.   Cardiovascular: Normal rate and regular rhythm. Exam reveals no gallop and no friction rub.   No murmur heard.  Pulses:       Carotid pulses are 2+ on the right side, and 2+ on the left side.       Radial pulses are 2+ on the right side, and 2+ on the left side.        Femoral pulses are 2+ on the right side, and 2+ on the left side.       Dorsalis pedis pulses are 2+ on the right side, and 2+ on the left side.        Posterior tibial pulses are 2+ on the right side, and 2+ on the left side.   Pulmonary/Chest: Breath sounds normal. No respiratory distress. He has no decreased breath sounds. He has no wheezes. He has no rhonchi. He has no rales. He exhibits no tenderness.   Abdominal: Soft. Bowel sounds are normal. He exhibits no distension. There is no tenderness. There is no rebound.   Musculoskeletal: He exhibits no edema or deformity. "   Neurological: He is alert and oriented to person, place, and time. He has normal strength. No sensory deficit.   Skin: No rash noted. No erythema.   Psychiatric: He has a normal mood and affect. His behavior is normal.     No results found for: CBC  No results found for: CMP  No components found for: LIPID  No results found for: BMP      ECG 12 Lead  Date/Time: 6/4/2020 12:52 PM  Performed by: Giovanny Leyva MD  Authorized by: Giovanny Leyva MD   Comparison: compared with previous ECG from 5/10/2019  Rhythm: sinus rhythm  Rate: normal  QRS axis: normal    Clinical impression: normal ECG               8/20/18  Conclusions:   1. Left main: Normal  2. LAD: Discrete 70-80% apical stenosis  3. LCX: 60% proximal OM1 stenosis  4. RCA: Acutely occluded in the midsegment  5.  Moderately depressed left ventricular systolic function.  Ejection fraction 30%.  Basal to mid inferior wall akinesis.  Mild anterolateral hypokinesis  6.  Successful PCI and aspiration thrombectomy of the RCA.  3.0 x 38 mm Xience Liol drug-eluting stent deployed across the proximal to mid segment and postdilated in the proximal to mid segment with a 3.5 x 20 mm noncompliant trek balloon to high pressure     DISCUSSION/SUMMARYThe patient is a 64-year-old male with a medical history of coronary artery disease with a myocardial infarction in 08/2018.  He underwent percutaneous coronary intervention to the proximal to mid-right coronary artery and tolerated this well.  His ejection fraction is normal.  He does complain of dyspnea on exertion that appears to be limiting and has activity.  He did have an episode of nausea, vertiginous symptoms, dyspnea and chest fullness but has not had chest pain consistent with stable angina.    1. Coronary disease with prior inferior myocardial infarction.   - Status post percutaneous coronary intervention of the proximal to mid-right coronary artery with drug-eluting stent.   - Continue aspirin and  Plavix.  If stress test is negative I will recommend he come off of Plavix.   - Continue his current statin dose.   -Not on beta-blocker secondary to resting bradycardia.   -I have recommended a standard treadmill stress test.  He will get this scheduled.  2.   Hyperlipidemia as above.  Continue atorvastatin at current dose.  3.   Lightheadedness.  -Unchanged.  Chronic.

## 2020-06-11 ENCOUNTER — HOSPITAL ENCOUNTER (OUTPATIENT)
Dept: CARDIOLOGY | Facility: HOSPITAL | Age: 65
Discharge: HOME | End: 2020-06-11
Admitting: INTERNAL MEDICINE

## 2020-06-11 DIAGNOSIS — R06.09 DOE (DYSPNEA ON EXERTION): ICD-10-CM

## 2020-06-11 LAB
BH CV STRESS BP STAGE 1: NORMAL
BH CV STRESS BP STAGE 2: NORMAL
BH CV STRESS DURATION MIN STAGE 1: 3
BH CV STRESS DURATION MIN STAGE 2: 3
BH CV STRESS DURATION MIN STAGE 3: 1
BH CV STRESS DURATION SEC STAGE 1: 0
BH CV STRESS DURATION SEC STAGE 2: 0
BH CV STRESS DURATION SEC STAGE 3: 0
BH CV STRESS GRADE STAGE 1: 10
BH CV STRESS GRADE STAGE 2: 12
BH CV STRESS GRADE STAGE 3: 14
BH CV STRESS HR STAGE 1: 94
BH CV STRESS HR STAGE 2: 129
BH CV STRESS HR STAGE 3: 143
BH CV STRESS METS STAGE 1: 5
BH CV STRESS METS STAGE 2: 7.5
BH CV STRESS METS STAGE 3: 10
BH CV STRESS PROTOCOL 1: NORMAL
BH CV STRESS RECOVERY BP: NORMAL MMHG
BH CV STRESS RECOVERY HR: 81 BPM
BH CV STRESS SPEED STAGE 1: 1.7
BH CV STRESS SPEED STAGE 2: 2.5
BH CV STRESS SPEED STAGE 3: 3.4
BH CV STRESS STAGE 1: 1
BH CV STRESS STAGE 2: 2
BH CV STRESS STAGE 3: 3
MAXIMAL PREDICTED HEART RATE: 157 BPM
PERCENT MAX PREDICTED HR: 91.08 %
STRESS BASELINE BP: NORMAL MMHG
STRESS BASELINE HR: 62 BPM
STRESS PERCENT HR: 107 %
STRESS POST ESTIMATED WORKLOAD: 8 METS
STRESS POST EXERCISE DUR MIN: 7 MIN
STRESS POST EXERCISE DUR SEC: 0 SEC
STRESS POST PEAK BP: NORMAL MMHG
STRESS POST PEAK HR: 143 BPM
STRESS TARGET HR: 133 BPM

## 2020-06-11 PROCEDURE — 93016 CV STRESS TEST SUPVJ ONLY: CPT | Performed by: INTERNAL MEDICINE

## 2020-06-11 PROCEDURE — 93018 CV STRESS TEST I&R ONLY: CPT | Performed by: INTERNAL MEDICINE

## 2020-06-11 PROCEDURE — 93017 CV STRESS TEST TRACING ONLY: CPT

## 2020-06-17 ENCOUNTER — TELEPHONE (OUTPATIENT)
Dept: CARDIOLOGY | Facility: CLINIC | Age: 65
End: 2020-06-17

## 2020-06-17 NOTE — TELEPHONE ENCOUNTER
----- Message from Giovanny Leyva MD sent at 6/12/2020  9:08 AM EDT -----  Ledy Brady, Mellissa Sarmiento, Giovanny Olvera MD             Are you still planning on him coming off his Plavix since it was normal?      Notes recorded by Giovanny Leyva MD on 6/11/2020 at 3:15 PM EDT  Please tell him that his stress test looks great

## 2020-07-01 ENCOUNTER — TRANSCRIBE ORDERS (OUTPATIENT)
Dept: SLEEP MEDICINE | Facility: HOSPITAL | Age: 65
End: 2020-07-01

## 2020-07-01 DIAGNOSIS — Z01.818 OTHER SPECIFIED PRE-OPERATIVE EXAMINATION: Primary | ICD-10-CM

## 2020-07-04 ENCOUNTER — LAB (OUTPATIENT)
Dept: LAB | Facility: HOSPITAL | Age: 65
End: 2020-07-04

## 2020-07-04 DIAGNOSIS — Z01.818 OTHER SPECIFIED PRE-OPERATIVE EXAMINATION: ICD-10-CM

## 2020-07-04 PROCEDURE — C9803 HOPD COVID-19 SPEC COLLECT: HCPCS

## 2020-07-04 PROCEDURE — U0004 COV-19 TEST NON-CDC HGH THRU: HCPCS

## 2020-07-06 LAB
REF LAB TEST METHOD: NORMAL
SARS-COV-2 RNA RESP QL NAA+PROBE: NOT DETECTED

## 2020-07-07 ENCOUNTER — HOSPITAL ENCOUNTER (OUTPATIENT)
Dept: RESPIRATORY THERAPY | Facility: HOSPITAL | Age: 65
Discharge: HOME | End: 2020-07-07
Admitting: INTERNAL MEDICINE

## 2020-07-07 DIAGNOSIS — J43.9 PULMONARY EMPHYSEMA, UNSPECIFIED EMPHYSEMA TYPE (HCC): ICD-10-CM

## 2020-07-07 PROCEDURE — 94726 PLETHYSMOGRAPHY LUNG VOLUMES: CPT

## 2020-07-07 PROCEDURE — 94060 EVALUATION OF WHEEZING: CPT

## 2020-07-07 PROCEDURE — 94729 DIFFUSING CAPACITY: CPT

## 2020-07-07 RX ORDER — ALBUTEROL SULFATE 2.5 MG/3ML
2.5 SOLUTION RESPIRATORY (INHALATION) ONCE AS NEEDED
Status: COMPLETED | OUTPATIENT
Start: 2020-07-07 | End: 2020-07-07

## 2020-07-07 RX ORDER — ALBUTEROL SULFATE 2.5 MG/3ML
SOLUTION RESPIRATORY (INHALATION)
Status: COMPLETED
Start: 2020-07-07 | End: 2020-07-07

## 2020-07-07 RX ADMIN — ALBUTEROL SULFATE 2.5 MG: 2.5 SOLUTION RESPIRATORY (INHALATION) at 14:37

## 2020-07-15 ENCOUNTER — TELEPHONE (OUTPATIENT)
Dept: CARDIOLOGY | Facility: CLINIC | Age: 65
End: 2020-07-15

## 2020-07-15 NOTE — TELEPHONE ENCOUNTER
----- Message from Giovanny Leyva MD sent at 7/8/2020  4:33 PM EDT -----  Please let him know that lung function tests look great.

## 2020-07-21 DIAGNOSIS — R06.09 DOE (DYSPNEA ON EXERTION): Primary | ICD-10-CM

## 2020-07-21 NOTE — TELEPHONE ENCOUNTER
I spoke with him   He is aware   I am going to refer him to pulmonary.  He still having dyspnea on exertion and I cannot come up with a clear cardiac etiology at this time

## 2020-07-21 NOTE — TELEPHONE ENCOUNTER
Pt called back and would like to discuss his PFTs with you. He said he is still getting short of breath with exertion and wants to know if there is any further tests he needs.  He can be reached at #[phone].    Thanks,  Mellissa

## 2020-08-18 ENCOUNTER — TELEPHONE (OUTPATIENT)
Dept: CARDIOLOGY | Facility: CLINIC | Age: 65
End: 2020-08-18

## 2020-08-18 DIAGNOSIS — E78.5 HYPERLIPIDEMIA, UNSPECIFIED HYPERLIPIDEMIA TYPE: ICD-10-CM

## 2020-08-18 DIAGNOSIS — I25.10 CORONARY ARTERY DISEASE INVOLVING NATIVE CORONARY ARTERY OF NATIVE HEART WITHOUT ANGINA PECTORIS: Primary | ICD-10-CM

## 2020-08-20 RX ORDER — ATORVASTATIN CALCIUM 40 MG/1
TABLET, FILM COATED ORAL
Qty: 90 TABLET | Refills: 0 | Status: SHIPPED | OUTPATIENT
Start: 2020-08-20 | End: 2020-11-23

## 2020-10-06 ENCOUNTER — TELEPHONE (OUTPATIENT)
Dept: CARDIOLOGY | Facility: CLINIC | Age: 65
End: 2020-10-06

## 2020-10-06 NOTE — TELEPHONE ENCOUNTER
"Dr. Leyva,    Mr. Frost called to report a 2 week history of daily BP's in the 140s/90s. Today he is 147/97. He complains that he can \"hear his heart beat in his ears\". He also continues to be SOA with strenuous exercise though this wasn't his reason for calling today.  You ordered labs on him previously but he hasn't had them drawn yet.    He previously had hypotension and isn't on any BP medications at this time.          Thank you,  Radha Marin RN  [facility]  Triage      "

## 2020-10-07 NOTE — TELEPHONE ENCOUNTER
I called him. He is coming in tomorrow at 12:45p. He will also be fasting since he is due for his lipid and CMP as well.    Mellissa

## 2020-10-08 ENCOUNTER — OFFICE VISIT (OUTPATIENT)
Dept: CARDIOLOGY | Facility: CLINIC | Age: 65
End: 2020-10-08

## 2020-10-08 ENCOUNTER — LAB (OUTPATIENT)
Dept: LAB | Facility: HOSPITAL | Age: 65
End: 2020-10-08

## 2020-10-08 VITALS
DIASTOLIC BLOOD PRESSURE: 98 MMHG | SYSTOLIC BLOOD PRESSURE: 160 MMHG | HEIGHT: 72 IN | BODY MASS INDEX: 26.01 KG/M2 | HEART RATE: 56 BPM | WEIGHT: 192 LBS

## 2020-10-08 DIAGNOSIS — N40.0 BENIGN PROSTATIC HYPERPLASIA, UNSPECIFIED WHETHER LOWER URINARY TRACT SYMPTOMS PRESENT: Primary | ICD-10-CM

## 2020-10-08 DIAGNOSIS — I25.10 CORONARY ARTERY DISEASE INVOLVING NATIVE CORONARY ARTERY OF NATIVE HEART WITHOUT ANGINA PECTORIS: ICD-10-CM

## 2020-10-08 DIAGNOSIS — R06.09 DOE (DYSPNEA ON EXERTION): ICD-10-CM

## 2020-10-08 DIAGNOSIS — E78.5 HYPERLIPIDEMIA, UNSPECIFIED HYPERLIPIDEMIA TYPE: ICD-10-CM

## 2020-10-08 DIAGNOSIS — I10 ESSENTIAL HYPERTENSION: ICD-10-CM

## 2020-10-08 DIAGNOSIS — Z95.5 STATUS POST INSERTION OF DRUG-ELUTING STENT INTO RIGHT CORONARY ARTERY FOR CORONARY ARTERY DISEASE: ICD-10-CM

## 2020-10-08 DIAGNOSIS — Z72.0 TOBACCO ABUSE: ICD-10-CM

## 2020-10-08 LAB
ALBUMIN SERPL-MCNC: 4.2 G/DL (ref 3.5–5.2)
ALBUMIN/GLOB SERPL: 1.6 G/DL
ALP SERPL-CCNC: 70 U/L (ref 39–117)
ALT SERPL W P-5'-P-CCNC: 24 U/L (ref 1–41)
ANION GAP SERPL CALCULATED.3IONS-SCNC: 7.8 MMOL/L (ref 5–15)
AST SERPL-CCNC: 21 U/L (ref 1–40)
BILIRUB SERPL-MCNC: 0.5 MG/DL (ref 0–1.2)
BUN SERPL-MCNC: 13 MG/DL (ref 8–23)
BUN/CREAT SERPL: 13.4 (ref 7–25)
CALCIUM SPEC-SCNC: 9.4 MG/DL (ref 8.6–10.5)
CHLORIDE SERPL-SCNC: 102 MMOL/L (ref 98–107)
CHOLEST SERPL-MCNC: 111 MG/DL (ref 0–200)
CO2 SERPL-SCNC: 29.2 MMOL/L (ref 22–29)
CREAT SERPL-MCNC: 0.97 MG/DL (ref 0.76–1.27)
GFR SERPL CREATININE-BSD FRML MDRD: 78 ML/MIN/1.73
GLOBULIN UR ELPH-MCNC: 2.6 GM/DL
GLUCOSE SERPL-MCNC: 92 MG/DL (ref 65–99)
HDLC SERPL-MCNC: 38 MG/DL (ref 40–60)
LDLC SERPL CALC-MCNC: 58 MG/DL (ref 0–100)
LDLC/HDLC SERPL: 1.55 {RATIO}
POTASSIUM SERPL-SCNC: 4.5 MMOL/L (ref 3.5–5.2)
PROT SERPL-MCNC: 6.8 G/DL (ref 6–8.5)
SODIUM SERPL-SCNC: 139 MMOL/L (ref 136–145)
TRIGL SERPL-MCNC: 70 MG/DL (ref 0–150)
VLDLC SERPL-MCNC: 15 MG/DL (ref 5–40)

## 2020-10-08 PROCEDURE — 80053 COMPREHEN METABOLIC PANEL: CPT

## 2020-10-08 PROCEDURE — 93000 ELECTROCARDIOGRAM COMPLETE: CPT | Performed by: NURSE PRACTITIONER

## 2020-10-08 PROCEDURE — 80061 LIPID PANEL: CPT

## 2020-10-08 PROCEDURE — 36415 COLL VENOUS BLD VENIPUNCTURE: CPT

## 2020-10-08 PROCEDURE — 99214 OFFICE O/P EST MOD 30 MIN: CPT | Performed by: NURSE PRACTITIONER

## 2020-10-08 RX ORDER — LISINOPRIL 10 MG/1
10 TABLET ORAL DAILY
Qty: 30 TABLET | Refills: 11 | Status: SHIPPED | OUTPATIENT
Start: 2020-10-08 | End: 2021-10-12

## 2020-10-08 RX ORDER — TAMSULOSIN HYDROCHLORIDE 0.4 MG/1
1 CAPSULE ORAL DAILY
Qty: 30 CAPSULE | Refills: 1 | Status: SHIPPED | OUTPATIENT
Start: 2020-10-08 | End: 2020-12-07 | Stop reason: SDUPTHER

## 2020-10-08 RX ORDER — ISOSORBIDE MONONITRATE 30 MG/1
30 TABLET, EXTENDED RELEASE ORAL DAILY
Qty: 30 TABLET | Refills: 11 | Status: SHIPPED | OUTPATIENT
Start: 2020-10-08 | End: 2020-12-17 | Stop reason: SDDI

## 2020-10-08 RX ORDER — LISINOPRIL 10 MG/1
5 TABLET ORAL DAILY
Qty: 30 TABLET | Refills: 11 | Status: SHIPPED | OUTPATIENT
Start: 2020-10-08 | End: 2020-10-08

## 2020-10-08 NOTE — PROGRESS NOTES
Date of Office Visit: 10/08/2020  Encounter Provider: VARINDER Beaulieu  Place of Service: [facility] CARDIOLOGY  Patient Name: Flavio Frost  :1955    Chief Complaint   Patient presents with   • Hypertension   :     HPI: Flavio Frost is a 64 y.o. male who presents today for issues recently with his blood pressure.  He is a patient of Dr. traylor and he is new to me today.  In 2018 he had an acute MI and was noted to have an occlusion in his mid right coronary and had a drug-eluting stent placed.  He had some other moderate vessel disease in the OM of the circumflex about 60% and the apical LAD 70 to 80%.  There was some other diffuse 30% disease.  Left main was normal.  EF on cardiac cath was about 30% and echocardiogram showed EF greater than 70%.  He had no significant valvular heart disease.  At that time he did not tolerate beta-blockers to be due to fatigue and bradycardia.  Due to some issues with some hypotension he was unable to also tolerate ACEs or ARBS.  Over the summer he came to see Dr. Leyva for dyspnea with exertion.  He has stopped smoking he [place] but does not watch his diet.  We did a treadmill test that was essentially unremarkable.  He was referred to pulmonary for pulmonary evaluation and had an unremarkable PFT and pulmonary said this was not his lungs.    Patient called yesterday stating that his blood pressure was elevated.  He had recently stopped taking his Flomax however it really was not his Flomax he was taking Flomax he was getting from a friend every day for the last year because it helped him urinate.  He lost his primary care doctor and has not been getting close follow-up.    Past Medical History:   Diagnosis Date   • BPH (benign prostatic hyperplasia)    • Bradycardia    • Cancer (CMS/HCC)     skin cancer   • Coronary artery disease    • Enlarged prostate    • Hyperlipidemia    •  Hypotension    • Myocardial infarction (CMS/HCC)        Past Surgical History:   Procedure Laterality Date   • CARDIAC CATHETERIZATION     • CARDIAC CATHETERIZATION N/A 8/20/2018    Procedure: Left Heart Cath;  Surgeon: Giovanny Leyva MD;  Location:  [facility] CATH INVASIVE LOCATION;  Service: Cardiology   • CARDIAC CATHETERIZATION N/A 8/20/2018    Procedure: Stent CARRIE coronary;  Surgeon: Giovanny Leyva MD;  Location:  [facility] CATH INVASIVE LOCATION;  Service: Cardiology   • CARDIAC CATHETERIZATION N/A 8/20/2018    Procedure: Coronary angiography;  Surgeon: Giovanny Leyva MD;  Location:  [facility] INVASIVE [facility]ATION;  Service: Cardiology   • CARDIAC CATHETERIZATION N/A 8/20/2018    Procedure: Left ventriculography;  Surgeon: Giovanny Leyva MD;  Location:  [facility] INV[facility];  Service: Cardiology   • CARDIAC CATHETERIZATION  8/20/2018    Procedure: Percutaneous Mechanical Thrombectomy;  Surgeon: Giovanny Leyva MD;  Location:  [facility] C[facility] INVAS[facility] LOCATION;  Service: Cardiology   • WA RT/LT HEART CATHETERS N/A 8/20/2018    Procedure: Percutaneous Coronary Intervention;  Surgeon: Giovanny Leyva MD;  Location:  [facility] CATH INVASIVE LOCATION;  Service: Cardiology   • SKIN BIOPSY     • SKIN CANCER EXCISION  2001    Basal cell carcinoma x 2, malignant melanoma x 1   • WRIST SURGERY Left 2007       Social History     Socioeconomic History   • Marital status: Single     Spouse name: Not on file   • Number of children: Not on file   • Years of education: Not on file   • Highest education level: Not on file   Tobacco Use   • Smoking status: Current Every Day Smoker     Packs/day: 1.00     Types: Electronic Cigarette   • Smokeless tobacco: Never Used   • Tobacco comment: Quit smoking cigarettes 8/2018, now just uses ECig   Substance and Sexual Activity   • Alcohol use: Yes     Comment: very little   • Drug use: No   • Sexual activity: Defer       Family History   Problem  "Relation Age of Onset   • Heart attack Mother 60   • Heart disease Mother    • Hypertension Mother    • Diabetes Mother    • Heart attack Father 43   • Heart disease Father    • No Known Problems Daughter    • No Known Problems Daughter        Review of Systems   Constitution: Negative for diaphoresis and malaise/fatigue.   Cardiovascular: Positive for dyspnea on exertion. Negative for chest pain, claudication, irregular heartbeat, leg swelling, near-syncope, orthopnea, palpitations, paroxysmal nocturnal dyspnea and syncope.   Respiratory: Negative for cough, shortness of breath and sleep disturbances due to breathing.    Musculoskeletal: Negative for falls.   Neurological: Negative for dizziness and weakness.   Psychiatric/Behavioral: Negative for altered mental status and substance abuse.       Allergies   Allergen Reactions   • Penicillins Hives   • Sertraline Anxiety     ?halllucinations         Current Outpatient Medications:   •  albuterol (PROVENTIL HFA;VENTOLIN HFA) 108 (90 Base) MCG/ACT inhaler, Inhale 2 puffs Every 6 (Six) Hours As Needed for Wheezing., Disp: 1 inhaler, Rfl: 0  •  aspirin 81 MG chewable tablet, Chew 1 tablet Daily., Disp: 30 tablet, Rfl: 11  •  atorvastatin (LIPITOR) 40 MG tablet, TAKE ONE TABLET BY MOUTH ONCE NIGHTLY, Disp: 90 tablet, Rfl: 0  •  nitroglycerin (NITROSTAT) 0.4 MG SL tablet, 1 under the tongue as needed for angina, may repeat q5mins for up three doses, Disp: 45 tablet, Rfl: 11  •  isosorbide mononitrate (IMDUR) 30 MG 24 hr tablet, Take 1 tablet by mouth Daily., Disp: 30 tablet, Rfl: 11  •  lisinopril (PRINIVIL,ZESTRIL) 10 MG tablet, Take 1 tablet by mouth Daily., Disp: 30 tablet, Rfl: 11  •  tamsulosin (FLOMAX) 0.4 MG capsule 24 hr capsule, Take 1 capsule by mouth Daily., Disp: 30 capsule, Rfl: 1      Objective:     Vitals:    10/08/20 1240   BP: 160/98   Pulse: 56   Weight: 87.1 kg (192 lb)   Height: 182.9 cm (72\")     Body mass index is 26.04 kg/m².    PHYSICAL " EXAM:    Constitutional:       General: Not in acute distress.     Appearance: Normal appearance. Well-developed.   Eyes:      Pupils: Pupils are equal, round, and reactive to light.   HENT:      Head: Normocephalic.   Neck:      Musculoskeletal: Normal range of motion and neck supple. No edema.      Vascular: No carotid bruit or JVD.   Pulmonary:      Effort: Pulmonary effort is normal. No tachypnea.      Breath sounds: Normal breath sounds. No wheezing. No rales.   Cardiovascular:      Normal rate. Regular rhythm.      No gallop.   Pulses:     Intact distal pulses.   Edema:     Peripheral edema absent.   Abdominal:      General: Bowel sounds are normal.      Palpations: Abdomen is soft.      Tenderness: There is no abdominal tenderness.   Musculoskeletal: Normal range of motion.   Skin:     General: Skin is warm and dry.   Neurological:      Mental Status: Alert and oriented to person, place, and time.           ECG 12 Lead    Date/Time: 10/8/2020 1:44 PM  Performed by: Maria L Emerson APRN  Authorized by: Maria L Emerson APRN   Comparison: compared with previous ECG from 6/4/2020  Similar to previous ECG  Rhythm: sinus rhythm  Rate: normal  QRS axis: right    Clinical impression: normal ECG              Assessment:       Diagnosis Plan   1. Benign prostatic hyperplasia, unspecified whether lower urinary tract symptoms present  Ambulatory Referral to Family Practice    Basic Metabolic Panel   2. BOX (dyspnea on exertion)  Stress Test With Myocardial Perfusion One Day   3. Coronary artery disease involving native coronary artery of native heart without angina pectoris     4. Essential hypertension     5. Hyperlipidemia, unspecified hyperlipidemia type     6. Status post insertion of drug-eluting stent into right coronary artery for coronary artery disease     7. Tobacco abuse       Orders Placed This Encounter   Procedures   • Basic Metabolic Panel     Standing Status:   Future     Standing Expiration Date:    10/8/2021   • Ambulatory Referral to Family Practice     Referral Priority:   Routine     Referral Type:   Routine Care     Referral Reason:   Specialty Services Required     Requested Specialty:   Family Medicine     Number of Visits Requested:   1   • Stress Test With Myocardial Perfusion One Day     Standing Status:   Future     Standing Expiration Date:   10/8/2021     Order Specific Question:   What stress agent will be used?     Answer:   Exercise with possible pharmacologic     Order Specific Question:   Reason for exam?     Answer:   Known Coronary Artery Disease     Order Specific Question:   Known CAD specification?     Answer:   Follow-up with New or Worsening Symptoms          Plan:       Mr. Alexander blood pressure is high in the office today I am going to start him on some lisinopril 10 mg a day.  He also has lost follow-up with his PCP and is asking me for some Flomax I will give him a month supply and I am referring him to a PCP clinic for further management from there.  I am concerned that some of his dyspnea with exertion could be an angina equivalent.  When he had his MI in 2018 he never had chest pain he felt like he had the flu and had some vomiting.  So what I am get a do is put him on some nitrates see if this helps and schedule him for stress test with Card[facility] imaging.  I discussed this plan with Dr. Prajapati who agreed.        Your medication list          Accurate as of October 8, 2020  1:33 PM. If you have any questions, ask your nurse or doctor.            START taking these medications      Instructions Last Dose Given Next Dose Due   isosorbide mononitrate 30 MG 24 hr tablet  Commonly known as: IMDUR  Started by: VARINDER Beaulieu      Take 1 tablet by mouth Daily.       lisinopril 10 MG tablet  Commonly known as: PRINIVIL,ZESTRIL  Started by: VARINDER Beaulieu      Take 1 tablet by mouth Daily.       tamsulosin 0.4 MG capsule 24 hr capsule  Commonly known as:  FLOMAX  Started by: VARINDER Beaulieu      Take 1 capsule by mouth Daily.          CONTINUE taking these medications      Instructions Last Dose Given Next Dose Due   albuterol sulfate  (90 Base) MCG/ACT inhaler  Commonly known as: PROVENTIL HFA;VENTOLIN HFA;PROAIR HFA      Inhale 2 puffs Every 6 (Six) Hours As Needed for Wheezing.       aspirin 81 MG chewable tablet      Chew 1 tablet Daily.       atorvastatin 40 MG tablet  Commonly known as: LIPITOR      TAKE ONE TABLET BY MOUTH ONCE NIGHTLY       nitroglycerin 0.4 MG SL tablet  Commonly known as: NITROSTAT      1 under the tongue as needed for angina, may repeat q5mins for up three doses             Where to Get Your Medications      [facility] [address] [place] [place] [facility] [address] [facility] - [phone]  - [phone] [address] [address]    Phone: [phone]   · isosorbide mononitrate 30 MG 24 hr tablet  · lisinopril 10 MG tablet  · tamsulosin 0.4 MG capsule 24 hr capsule           As always, it has been a pleasure to participate in your patient's care.      Sincerely,     Maria L REEDER

## 2020-10-09 ENCOUNTER — TELEPHONE (OUTPATIENT)
Dept: CARDIOLOGY | Facility: CLINIC | Age: 65
End: 2020-10-09

## 2020-10-09 NOTE — TELEPHONE ENCOUNTER
----- Message from VARINDER Beaulieu sent at 10/9/2020 12:00 PM EDT -----  Let him know they are normal   ----- Message -----  From: Mellissa Brady MA  Sent: 10/9/2020  10:24 AM EDT  To: VARINDER Beaulieu    Can you address since you saw him yesterday. This was just routine lipids.    Mellissa Pond  ----- Message -----  From: Lab, Background User  Sent: 10/8/2020   1:34 PM EDT  To: Giovanny Leyva MD

## 2020-10-15 ENCOUNTER — HOSPITAL ENCOUNTER (OUTPATIENT)
Dept: CARDIOLOGY | Facility: HOSPITAL | Age: 65
Discharge: HOME | End: 2020-10-15
Admitting: NURSE PRACTITIONER

## 2020-10-15 VITALS — HEIGHT: 72 IN | BODY MASS INDEX: 26.01 KG/M2 | WEIGHT: 192 LBS

## 2020-10-15 DIAGNOSIS — R06.09 DOE (DYSPNEA ON EXERTION): ICD-10-CM

## 2020-10-15 LAB
BH CV NUCLEAR PRIOR STUDY: 2
BH CV STRESS BP STAGE 1: NORMAL
BH CV STRESS BP STAGE 2: NORMAL
BH CV STRESS BP STAGE 3: NORMAL
BH CV STRESS DURATION MIN STAGE 1: 3
BH CV STRESS DURATION MIN STAGE 2: 3
BH CV STRESS DURATION MIN STAGE 3: 2
BH CV STRESS DURATION SEC STAGE 1: 0
BH CV STRESS DURATION SEC STAGE 2: 0
BH CV STRESS DURATION SEC STAGE 3: 0
BH CV STRESS GRADE STAGE 1: 10
BH CV STRESS GRADE STAGE 2: 12
BH CV STRESS GRADE STAGE 3: 14
BH CV STRESS HR STAGE 1: 92
BH CV STRESS HR STAGE 2: 125
BH CV STRESS HR STAGE 3: 146
BH CV STRESS METS STAGE 1: 5
BH CV STRESS METS STAGE 2: 7.5
BH CV STRESS METS STAGE 3: 10
BH CV STRESS PROTOCOL 1: NORMAL
BH CV STRESS RECOVERY BP: NORMAL MMHG
BH CV STRESS RECOVERY HR: 90 BPM
BH CV STRESS SPEED STAGE 1: 1.7
BH CV STRESS SPEED STAGE 2: 2.5
BH CV STRESS SPEED STAGE 3: 3.4
BH CV STRESS STAGE 1: 1
BH CV STRESS STAGE 2: 2
BH CV STRESS STAGE 3: 3
LV EF NUC BP: 55 %
MAXIMAL PREDICTED HEART RATE: 157 BPM
PERCENT MAX PREDICTED HR: 92.99 %
STRESS BASELINE BP: NORMAL MMHG
STRESS BASELINE HR: 63 BPM
STRESS PERCENT HR: 109 %
STRESS POST ESTIMATED WORKLOAD: 9 METS
STRESS POST EXERCISE DUR MIN: 8 MIN
STRESS POST EXERCISE DUR SEC: 0 SEC
STRESS POST PEAK BP: NORMAL MMHG
STRESS POST PEAK HR: 146 BPM
STRESS TARGET HR: 133 BPM

## 2020-10-15 PROCEDURE — 93018 CV STRESS TEST I&R ONLY: CPT | Performed by: INTERNAL MEDICINE

## 2020-10-15 PROCEDURE — 93016 CV STRESS TEST SUPVJ ONLY: CPT | Performed by: INTERNAL MEDICINE

## 2020-10-15 PROCEDURE — 93017 CV STRESS TEST TRACING ONLY: CPT

## 2020-10-15 PROCEDURE — A9502 TC99M TETROFOSMIN: HCPCS | Performed by: NURSE PRACTITIONER

## 2020-10-15 PROCEDURE — 78452 HT MUSCLE IMAGE SPECT MULT: CPT | Performed by: INTERNAL MEDICINE

## 2020-10-15 PROCEDURE — 78452 HT MUSCLE IMAGE SPECT MULT: CPT

## 2020-10-15 PROCEDURE — 0 TECHNETIUM TETROFOSMIN KIT: Performed by: NURSE PRACTITIONER

## 2020-10-15 RX ADMIN — TETROFOSMIN 1 DOSE: 1.38 INJECTION, POWDER, LYOPHILIZED, FOR SOLUTION INTRAVENOUS at 12:02

## 2020-10-15 RX ADMIN — TETROFOSMIN 1 DOSE: 1.38 INJECTION, POWDER, LYOPHILIZED, FOR SOLUTION INTRAVENOUS at 12:58

## 2020-10-16 ENCOUNTER — TELEPHONE (OUTPATIENT)
Dept: CARDIOLOGY | Facility: CLINIC | Age: 65
End: 2020-10-16

## 2020-10-20 ENCOUNTER — TELEPHONE (OUTPATIENT)
Dept: CARDIOLOGY | Facility: CLINIC | Age: 65
End: 2020-10-20

## 2020-10-20 NOTE — TELEPHONE ENCOUNTER
Talk to patient about his stress test results.  No significant ischemia and normal LV function.  He remains to feel short of breath.  He has had a pulmonary evaluation that was normal.  He is not taking the Imdur that I put him on so I told him to go ahead and start that and let us see if it makes a difference in how he feels.  He is getting follow-up with me next week.  If he does not show any improvement we may need to proceed with coronary angiography.

## 2020-11-23 ENCOUNTER — PATIENT MESSAGE (OUTPATIENT)
Dept: CARDIOLOGY | Facility: CLINIC | Age: 65
End: 2020-11-23

## 2020-11-23 RX ORDER — ATORVASTATIN CALCIUM 40 MG/1
TABLET, FILM COATED ORAL
Qty: 90 TABLET | Refills: 4 | Status: SHIPPED | OUTPATIENT
Start: 2020-11-23 | End: 2020-11-23 | Stop reason: SDUPTHER

## 2020-11-23 RX ORDER — ATORVASTATIN CALCIUM 40 MG/1
40 TABLET, FILM COATED ORAL NIGHTLY
Qty: 90 TABLET | Refills: 2 | Status: SHIPPED | OUTPATIENT
Start: 2020-11-23 | End: 2022-02-18

## 2020-12-04 ENCOUNTER — TELEPHONE (OUTPATIENT)
Dept: CARDIOLOGY | Facility: CLINIC | Age: 65
End: 2020-12-04

## 2020-12-04 NOTE — TELEPHONE ENCOUNTER
S/W pt re: needing refill on Tamsulosin. Pt stated the PCP that was recommended to him wasn't accepting new pt's so he has to find someone else. Pt stated he has enough to last him til Monday.   Please advise of recommendations.    VINICIUS Vazquez

## 2020-12-07 RX ORDER — TAMSULOSIN HYDROCHLORIDE 0.4 MG/1
1 CAPSULE ORAL DAILY
Qty: 30 CAPSULE | Refills: 1 | Status: SHIPPED | OUTPATIENT
Start: 2020-12-07 | End: 2021-02-08

## 2020-12-17 ENCOUNTER — OFFICE VISIT (OUTPATIENT)
Dept: CARDIOLOGY | Facility: CLINIC | Age: 65
End: 2020-12-17

## 2020-12-17 VITALS
SYSTOLIC BLOOD PRESSURE: 122 MMHG | DIASTOLIC BLOOD PRESSURE: 80 MMHG | HEIGHT: 72 IN | WEIGHT: 194 LBS | BODY MASS INDEX: 26.28 KG/M2 | HEART RATE: 55 BPM

## 2020-12-17 DIAGNOSIS — E78.5 HYPERLIPIDEMIA, UNSPECIFIED HYPERLIPIDEMIA TYPE: ICD-10-CM

## 2020-12-17 DIAGNOSIS — I10 ESSENTIAL HYPERTENSION: Primary | ICD-10-CM

## 2020-12-17 DIAGNOSIS — I25.10 CORONARY ARTERY DISEASE INVOLVING NATIVE CORONARY ARTERY OF NATIVE HEART WITHOUT ANGINA PECTORIS: ICD-10-CM

## 2020-12-17 DIAGNOSIS — R06.09 DOE (DYSPNEA ON EXERTION): ICD-10-CM

## 2020-12-17 DIAGNOSIS — Z95.5 STATUS POST INSERTION OF DRUG-ELUTING STENT INTO RIGHT CORONARY ARTERY FOR CORONARY ARTERY DISEASE: ICD-10-CM

## 2020-12-17 PROCEDURE — 93000 ELECTROCARDIOGRAM COMPLETE: CPT | Performed by: INTERNAL MEDICINE

## 2020-12-17 PROCEDURE — 99214 OFFICE O/P EST MOD 30 MIN: CPT | Performed by: INTERNAL MEDICINE

## 2020-12-17 NOTE — PROGRESS NOTES
Date of Office Visit: 20  Encounter Provider: Giovanny Leyva MD  Place of Service: [facility] CARDIOLOGY  Patient Name: Flavio Frost  :1955    Chief Complaint   Patient presents with   • Follow-up     6 month   • Coronary Artery Disease   • Shortness of Breath   :     HPI:   65 y.o. male who presents today in follow-up of his coronary artery disease and essential hypertension.  In 2018 he had an acute MI and was noted to have an occlusion in his mid right coronary and had a drug-eluting stent placed.  He had some other moderate vessel disease in the OM of the circumflex about 60% and the apical LAD 70 to 80%.  There was some other diffuse 30% disease.  Left main was normal.  EF on cardiac cath was about 30% and echocardiogram showed EF greater than 70%.  He had no significant valvular heart disease.  At that time he did not tolerate beta-blockers to be due to fatigue and bradycardia.   He typically has complaints of dyspnea on exertion but denies any chest pain or orthopnea. No PND. He has had multiple evaluations including a perfusion stress test which was recently performed back in 2020. This was normal. He presents back for followup and his blood pressure and heart rate are well controlled. His lipid panel back in October showed an LDL of 58 and total cholesterol of 111, which was excellent.     Past Medical History:   Diagnosis Date   • BPH (benign prostatic hyperplasia)    • Bradycardia    • Cancer (CMS/HCC)     skin cancer   • Coronary artery disease    • Enlarged prostate    • Hyperlipidemia    • Hypotension    • Myocardial infarction (CMS/HCC)        Past Surgical History:   Procedure Laterality Date   • CARDIAC CATHETERIZATION     • CARDIAC CATHETERIZATION N/A 2018    Procedure: Left Heart Cath;  Surgeon: Giovanny Leyva MD;  Location: [facility] INVASIVE LOCATION;  Service: Cardiology   • CARDIAC CATHETERIZATION N/A  8/20/2018    Procedure: Stent CARRIE coronary;  Surgeon: Giovanny Leyva MD;  Location:  [facility] CATH INVASIVE LOCATION;  Service: Cardiology   • CARDIAC CATHETERIZATION N/A 8/20/2018    Procedure: Coronary angiography;  Surgeon: Giovanny Leyva MD;  Location:  [facility] CATH INVAS[facility] LOC[facility];  Service: Cardiology   • CARDIAC CATHETERIZATION N/A 8/20/2018    Procedure: Left ventriculography;  Surgeon: Giovanny Leyva MD;  Location:  [facility];  Service: Cardiology   • CARDIAC CATHETERIZATION  8/20/2018    Procedure: Percutaneous Mechanical Thrombectomy;  Surgeon: Giovanny Leyva MD;  Location:  [facility] CATH INVASIVE LOCATION;  Service: Cardiology   • MO RT/LT HEART CATHETERS N/A 8/20/2018    Procedure: Percutaneous Coronary Intervention;  Surgeon: Giovanny Leyva MD;  Location:  [facility] CATH INVASIVE LOCATION;  Service: Cardiology   • SKIN BIOPSY     • SKIN CANCER EXCISION  2001    Basal cell carcinoma x 2, malignant melanoma x 1   • WRIST SURGERY Left 2007       Social History     Socioeconomic History   • Marital status: Single     Spouse name: Not on file   • Number of children: Not on file   • Years of education: Not on file   • Highest education level: Not on file   Tobacco Use   • Smoking status: Current Every Day Smoker     Packs/day: 1.00     Types: Electronic Cigarette   • Smokeless tobacco: Never Used   • Tobacco comment: Quit smoking cigarettes 8/2018, now just uses ECig   Substance and Sexual Activity   • Alcohol use: Yes     Comment: very little   • Drug use: No   • Sexual activity: Defer       Family History   Problem Relation Age of Onset   • Heart attack Mother 60   • Heart disease Mother    • Hypertension Mother    • Diabetes Mother    • Heart attack Father 43   • Heart disease Father    • No Known Problems Daughter    • No Known Problems Daughter        Review of Systems   Constitution: Negative for diaphoresis, fever and malaise/fatigue.   HENT: Negative  "for nosebleeds and sore throat.    Eyes: Negative for blurred vision and double vision.   Cardiovascular: Positive for dyspnea on exertion. Negative for chest pain, claudication, irregular heartbeat, leg swelling, near-syncope, orthopnea, palpitations, paroxysmal nocturnal dyspnea and syncope.   Respiratory: Negative for cough, shortness of breath, sleep disturbances due to breathing and snoring.    Endocrine: Negative for cold intolerance, heat intolerance and polydipsia.   Skin: Negative for itching, poor wound healing and rash.   Musculoskeletal: Negative for falls, joint pain, joint swelling, muscle weakness and myalgias.   Gastrointestinal: Negative for abdominal pain, melena, nausea and vomiting.   Neurological: Negative for dizziness, light-headedness, loss of balance, seizures, vertigo and weakness.   Psychiatric/Behavioral: Negative for altered mental status, depression and substance abuse.       Allergies   Allergen Reactions   • Penicillins Hives   • Sertraline Anxiety     ?halllucinations         Current Outpatient Medications:   •  albuterol (PROVENTIL HFA;VENTOLIN HFA) 108 (90 Base) MCG/ACT inhaler, Inhale 2 puffs Every 6 (Six) Hours As Needed for Wheezing., Disp: 1 inhaler, Rfl: 0  •  aspirin 81 MG chewable tablet, Chew 1 tablet Daily., Disp: 30 tablet, Rfl: 11  •  atorvastatin (LIPITOR) 40 MG tablet, Take 1 tablet by mouth Every Night., Disp: 90 tablet, Rfl: 2  •  lisinopril (PRINIVIL,ZESTRIL) 10 MG tablet, Take 1 tablet by mouth Daily., Disp: 30 tablet, Rfl: 11  •  nitroglycerin (NITROSTAT) 0.4 MG SL tablet, 1 under the tongue as needed for angina, may repeat q5mins for up three doses, Disp: 45 tablet, Rfl: 11  •  tamsulosin (FLOMAX) 0.4 MG capsule 24 hr capsule, Take 1 capsule by mouth Daily., Disp: 30 capsule, Rfl: 1      Objective:     Vitals:    12/17/20 1310   BP: 122/80   Pulse: 55   Weight: 88 kg (194 lb)   Height: 182.9 cm (72\")     Body mass index is 26.31 kg/m².    PHYSICAL " EXAM:    Constitutional:       General: Not in acute distress.     Appearance: Normal appearance. Well-developed.   Eyes:      General: No scleral icterus.     Conjunctiva/sclera: Conjunctivae normal.      Pupils: Pupils are equal, round, and reactive to light.   HENT:      Head: Normocephalic and atraumatic.   Neck:      Musculoskeletal: Normal range of motion and neck supple. No edema.      Thyroid: No thyromegaly.      Vascular: Normal carotid pulses. No carotid bruit, hepatojugular reflux or JVD.      Trachea: No tracheal deviation.   Pulmonary:      Effort: Pulmonary effort is normal. No tachypnea or respiratory distress.      Breath sounds: Normal breath sounds. No decreased breath sounds. No wheezing. No rhonchi. No rales.   Chest:      Chest wall: Not tender to palpatation.   Cardiovascular:      Normal rate. Regular rhythm.      No gallop.   Edema:     Peripheral edema absent.   Abdominal:      General: Bowel sounds are normal. There is no distension.      Palpations: Abdomen is soft.      Tenderness: There is no abdominal tenderness. There is no rebound.   Musculoskeletal: Normal range of motion.         General: No deformity.   Skin:     General: Skin is warm and dry.      Findings: No erythema or rash.   Neurological:      Mental Status: Alert and oriented to person, place, and time.      Sensory: No sensory deficit.   Psychiatric:         Behavior: Behavior normal.           ECG 12 Lead    Date/Time: 12/17/2020 1:41 PM  Performed by: Giovanny Leyva MD  Authorized by: Giovanny Leyva MD   Comparison: compared with previous ECG from 12/8/2020  Similar to previous ECG  Rhythm: sinus rhythm  Rate: normal  QRS axis: normal    Clinical impression: normal ECG           10/15/2020  · GI artifact is present.  · Myocardial perfusion imaging indicates a normal myocardial perfusion study with no evidence of ischemia.  · Left ventricular ejection fraction is normal. (Calculated EF =  55%).  · Impressions are consistent with a low risk study.    Assessment:      No diagnosis found.  No orders of the defined types were placed in this encounter.    Plan:   A 65-year-old male with a medical history of coronary artery disease and prior inferior infarction along with PCI to the RCA, moderate small vessel disease on the left system, and normal ejection fraction, who presents back for followup.  He also has hypertension and hyperlipidemia that are well controlled.    1.  Coronary disease with prior inferior infarction.       Continue aspirin lifelong.         No longer on dual antiplatelet therapy.       No angina.  I do not think he needs additional ischemic workup as his only complaint at this point in time is dyspnea and he previously back in October had a perfusion stress test which looks fine.    2.  Hyperlipidemia:  Continue atorvastatin therapy at current dose, 40 mg p.o. every night.  I have reviewed his lipid panel which was recently checked back in October and everything is well controlled.  His LFTs are also normal on my review of that as well.  He denies myalgias.    3.  Essential hypertension:  Continue lisinopril therapy.  This is at goal.  His creatinine has been evaluated on his 10/08/2020 labs and looks fine.                 Your medication list          Accurate as of December 17, 2020  1:19 PM. If you have any questions, ask your nurse or doctor.            CONTINUE taking these medications      Instructions Last Dose Given Next Dose Due   albuterol sulfate  (90 Base) MCG/ACT inhaler  Commonly known as: PROVENTIL HFA;VENTOLIN HFA;PROAIR HFA      Inhale 2 puffs Every 6 (Six) Hours As Needed for Wheezing.       aspirin 81 MG chewable tablet      Chew 1 tablet Daily.       atorvastatin 40 MG tablet  Commonly known as: LIPITOR      Take 1 tablet by mouth Every Night.       lisinopril 10 MG tablet  Commonly known as: PRINIVIL,ZESTRIL      Take 1 tablet by mouth Daily.        nitroglycerin 0.4 MG SL tablet  Commonly known as: NITROSTAT      1 under the tongue as needed for angina, may repeat q5mins for up three doses       tamsulosin 0.4 MG capsule 24 hr capsule  Commonly known as: FLOMAX      Take 1 capsule by mouth Daily.          STOP taking these medications    isosorbide mononitrate 30 MG 24 hr tablet  Commonly known as: IMDUR  Stopped by: Giovanny Leyva MD

## 2021-02-08 RX ORDER — TAMSULOSIN HYDROCHLORIDE 0.4 MG/1
CAPSULE ORAL
Qty: 30 CAPSULE | Refills: 0 | Status: SHIPPED | OUTPATIENT
Start: 2021-02-08 | End: 2021-03-08

## 2021-03-08 RX ORDER — TAMSULOSIN HYDROCHLORIDE 0.4 MG/1
CAPSULE ORAL
Qty: 30 CAPSULE | Refills: 0 | Status: SHIPPED | OUTPATIENT
Start: 2021-03-08 | End: 2021-04-13 | Stop reason: SDUPTHER

## 2021-03-16 ENCOUNTER — BULK ORDERING (OUTPATIENT)
Dept: CASE MANAGEMENT | Facility: OTHER | Age: 66
End: 2021-03-16

## 2021-03-16 DIAGNOSIS — Z23 IMMUNIZATION DUE: ICD-10-CM

## 2021-03-22 RX ORDER — NITROGLYCERIN 0.4 MG/1
TABLET SUBLINGUAL
Qty: 45 TABLET | Refills: 11 | Status: SHIPPED | OUTPATIENT
Start: 2021-03-22

## 2021-03-22 RX ORDER — NITROGLYCERIN 0.4 MG/1
TABLET SUBLINGUAL
Qty: 25 TABLET | Refills: 10 | OUTPATIENT
Start: 2021-03-22

## 2021-03-22 NOTE — TELEPHONE ENCOUNTER
- Last appt 12/2020  - Next appt 6/2021  - Last ekg 12/2020  - Last labs: within last 6 months   Requesting refill.    DA

## 2021-04-13 RX ORDER — TAMSULOSIN HYDROCHLORIDE 0.4 MG/1
CAPSULE ORAL
Qty: 30 CAPSULE | Refills: 0 | OUTPATIENT
Start: 2021-04-13

## 2021-04-13 NOTE — TELEPHONE ENCOUNTER
Pt called. He would like to know if you would be willing to fill his Tamsulosin for him until he gets a PCP. He is 6 months away from being eligible for medicare and is having trouble finding a doctor to take his medicaid.  Please advise.    Thanks,  Mellissa

## 2021-04-14 RX ORDER — TAMSULOSIN HYDROCHLORIDE 0.4 MG/1
1 CAPSULE ORAL DAILY
Qty: 90 CAPSULE | Refills: 1 | Status: SHIPPED | OUTPATIENT
Start: 2021-04-14 | End: 2021-10-12

## 2021-08-03 ENCOUNTER — APPOINTMENT (OUTPATIENT)
Dept: CARDIOLOGY | Facility: HOSPITAL | Age: 66
End: 2021-08-03

## 2021-08-03 ENCOUNTER — APPOINTMENT (OUTPATIENT)
Dept: GENERAL RADIOLOGY | Facility: HOSPITAL | Age: 66
End: 2021-08-03

## 2021-08-03 ENCOUNTER — HOSPITAL ENCOUNTER (EMERGENCY)
Facility: HOSPITAL | Age: 66
Discharge: HOME | End: 2021-08-03
Attending: EMERGENCY MEDICINE | Admitting: EMERGENCY MEDICINE

## 2021-08-03 VITALS
BODY MASS INDEX: 26.68 KG/M2 | HEIGHT: 72 IN | OXYGEN SATURATION: 98 % | WEIGHT: 197 LBS | TEMPERATURE: 96.5 F | RESPIRATION RATE: 17 BRPM | HEART RATE: 50 BPM | DIASTOLIC BLOOD PRESSURE: 94 MMHG | SYSTOLIC BLOOD PRESSURE: 143 MMHG

## 2021-08-03 DIAGNOSIS — R00.2 PALPITATIONS: ICD-10-CM

## 2021-08-03 DIAGNOSIS — R00.1 SINUS BRADYCARDIA: ICD-10-CM

## 2021-08-03 DIAGNOSIS — R53.1 WEAKNESS: ICD-10-CM

## 2021-08-03 DIAGNOSIS — R07.89 ATYPICAL CHEST PAIN: Primary | ICD-10-CM

## 2021-08-03 LAB
ALBUMIN SERPL-MCNC: 3.9 G/DL (ref 3.5–5.2)
ALBUMIN/GLOB SERPL: 1.4 G/DL
ALP SERPL-CCNC: 62 U/L (ref 39–117)
ALT SERPL W P-5'-P-CCNC: 16 U/L (ref 1–41)
ANION GAP SERPL CALCULATED.3IONS-SCNC: 6.4 MMOL/L (ref 5–15)
AST SERPL-CCNC: 18 U/L (ref 1–40)
BASOPHILS # BLD AUTO: 0.03 10*3/MM3 (ref 0–0.2)
BASOPHILS NFR BLD AUTO: 0.4 % (ref 0–1.5)
BILIRUB SERPL-MCNC: 0.5 MG/DL (ref 0–1.2)
BILIRUB UR QL STRIP: NEGATIVE
BUN SERPL-MCNC: 17 MG/DL (ref 8–23)
BUN/CREAT SERPL: 18.5 (ref 7–25)
CALCIUM SPEC-SCNC: 9.2 MG/DL (ref 8.6–10.5)
CHLORIDE SERPL-SCNC: 105 MMOL/L (ref 98–107)
CLARITY UR: CLEAR
CO2 SERPL-SCNC: 26.6 MMOL/L (ref 22–29)
COLOR UR: YELLOW
CREAT SERPL-MCNC: 0.92 MG/DL (ref 0.76–1.27)
DEPRECATED RDW RBC AUTO: 43.3 FL (ref 37–54)
EOSINOPHIL # BLD AUTO: 0.05 10*3/MM3 (ref 0–0.4)
EOSINOPHIL NFR BLD AUTO: 0.7 % (ref 0.3–6.2)
ERYTHROCYTE [DISTWIDTH] IN BLOOD BY AUTOMATED COUNT: 13.7 % (ref 12.3–15.4)
GFR SERPL CREATININE-BSD FRML MDRD: 83 ML/MIN/1.73
GLOBULIN UR ELPH-MCNC: 2.8 GM/DL
GLUCOSE SERPL-MCNC: 99 MG/DL (ref 65–99)
GLUCOSE UR STRIP-MCNC: NEGATIVE MG/DL
HCT VFR BLD AUTO: 43.1 % (ref 37.5–51)
HGB BLD-MCNC: 14.5 G/DL (ref 13–17.7)
HGB UR QL STRIP.AUTO: NEGATIVE
HOLD SPECIMEN: NORMAL
HOLD SPECIMEN: NORMAL
IMM GRANULOCYTES # BLD AUTO: 0.04 10*3/MM3 (ref 0–0.05)
IMM GRANULOCYTES NFR BLD AUTO: 0.5 % (ref 0–0.5)
KETONES UR QL STRIP: NEGATIVE
LEUKOCYTE ESTERASE UR QL STRIP.AUTO: NEGATIVE
LYMPHOCYTES # BLD AUTO: 1.4 10*3/MM3 (ref 0.7–3.1)
LYMPHOCYTES NFR BLD AUTO: 18.9 % (ref 19.6–45.3)
MAGNESIUM SERPL-MCNC: 2.3 MG/DL (ref 1.6–2.4)
MCH RBC QN AUTO: 29.2 PG (ref 26.6–33)
MCHC RBC AUTO-ENTMCNC: 33.6 G/DL (ref 31.5–35.7)
MCV RBC AUTO: 86.7 FL (ref 79–97)
MONOCYTES # BLD AUTO: 0.42 10*3/MM3 (ref 0.1–0.9)
MONOCYTES NFR BLD AUTO: 5.7 % (ref 5–12)
NEUTROPHILS NFR BLD AUTO: 5.48 10*3/MM3 (ref 1.7–7)
NEUTROPHILS NFR BLD AUTO: 73.8 % (ref 42.7–76)
NITRITE UR QL STRIP: NEGATIVE
NRBC BLD AUTO-RTO: 0 /100 WBC (ref 0–0.2)
PH UR STRIP.AUTO: 6 [PH] (ref 5–8)
PLATELET # BLD AUTO: 214 10*3/MM3 (ref 140–450)
PMV BLD AUTO: 9.7 FL (ref 6–12)
POTASSIUM SERPL-SCNC: 4.8 MMOL/L (ref 3.5–5.2)
PROT SERPL-MCNC: 6.7 G/DL (ref 6–8.5)
PROT UR QL STRIP: NEGATIVE
RBC # BLD AUTO: 4.97 10*6/MM3 (ref 4.14–5.8)
SODIUM SERPL-SCNC: 138 MMOL/L (ref 136–145)
SP GR UR STRIP: 1.01 (ref 1–1.03)
T4 FREE SERPL-MCNC: 1.16 NG/DL (ref 0.93–1.7)
TROPONIN T SERPL-MCNC: <0.01 NG/ML (ref 0–0.03)
TROPONIN T SERPL-MCNC: <0.01 NG/ML (ref 0–0.03)
TSH SERPL DL<=0.05 MIU/L-ACNC: 0.9 UIU/ML (ref 0.27–4.2)
UROBILINOGEN UR QL STRIP: NORMAL
WBC # BLD AUTO: 7.42 10*3/MM3 (ref 3.4–10.8)
WHOLE BLOOD HOLD SPECIMEN: NORMAL

## 2021-08-03 PROCEDURE — 71046 X-RAY EXAM CHEST 2 VIEWS: CPT

## 2021-08-03 PROCEDURE — 84484 ASSAY OF TROPONIN QUANT: CPT | Performed by: EMERGENCY MEDICINE

## 2021-08-03 PROCEDURE — 85025 COMPLETE CBC W/AUTO DIFF WBC: CPT

## 2021-08-03 PROCEDURE — 93005 ELECTROCARDIOGRAM TRACING: CPT | Performed by: EMERGENCY MEDICINE

## 2021-08-03 PROCEDURE — 84439 ASSAY OF FREE THYROXINE: CPT | Performed by: EMERGENCY MEDICINE

## 2021-08-03 PROCEDURE — 93010 ELECTROCARDIOGRAM REPORT: CPT | Performed by: INTERNAL MEDICINE

## 2021-08-03 PROCEDURE — 81003 URINALYSIS AUTO W/O SCOPE: CPT | Performed by: EMERGENCY MEDICINE

## 2021-08-03 PROCEDURE — 99284 EMERGENCY DEPT VISIT MOD MDM: CPT

## 2021-08-03 PROCEDURE — 93005 ELECTROCARDIOGRAM TRACING: CPT

## 2021-08-03 PROCEDURE — 80053 COMPREHEN METABOLIC PANEL: CPT | Performed by: EMERGENCY MEDICINE

## 2021-08-03 PROCEDURE — 83735 ASSAY OF MAGNESIUM: CPT | Performed by: EMERGENCY MEDICINE

## 2021-08-03 PROCEDURE — 93246 EXT ECG>7D<15D RECORDING: CPT

## 2021-08-03 PROCEDURE — 84443 ASSAY THYROID STIM HORMONE: CPT | Performed by: EMERGENCY MEDICINE

## 2021-08-03 PROCEDURE — 36415 COLL VENOUS BLD VENIPUNCTURE: CPT

## 2021-08-03 RX ORDER — SODIUM CHLORIDE 0.9 % (FLUSH) 0.9 %
10 SYRINGE (ML) INJECTION AS NEEDED
Status: DISCONTINUED | OUTPATIENT
Start: 2021-08-03 | End: 2021-08-03 | Stop reason: HOSPADM

## 2021-08-03 RX ORDER — ASPIRIN 325 MG
325 TABLET ORAL ONCE
Status: DISCONTINUED | OUTPATIENT
Start: 2021-08-03 | End: 2021-08-03 | Stop reason: HOSPADM

## 2021-08-03 RX ADMIN — SODIUM CHLORIDE 500 ML: 9 INJECTION, SOLUTION INTRAVENOUS at 12:37

## 2021-08-03 NOTE — DISCHARGE INSTRUCTIONS
Wear Zio patch and keep event log.  Push fluids.  Restart lisinopril and Lipitor.  Call Dr. Leyva for follow-up appointment.

## 2021-08-03 NOTE — ED PROVIDER NOTES
" EMERGENCY DEPARTMENT ENCOUNTER    Room Number:  30/30  Date of encounter:  8/3/2021  PCP: Provider, No Known  Historian: Patient      HPI:  Chief Complaint: Not feeling well        Context: Flavio Frost is a 65 y.o. male who presents to the ED c/o not feeling well for several weeks.  The patient states approximately 15 to 18 days ago he began feeling palpitations that he has felt intermittently since that time.  He states that approximately 2 weeks ago he began feeling \"bad\".  He thought this may be due to his medication so he stopped his Lipitor and lisinopril.  He states he is continue to feel poorly since that time and today when he got up he felt dizzy and had to sit back down.  He thought he may be dehydrated so he drank some fluids but did not feel any better.  He states he then began to feel a vague heaviness in his chest that he rates at a 1 out of 10 and thus he came to the emergency room for evaluation.  He states the symptoms remind him of when he had his heart attack in 2018.  He states his cardiologist is Dr. Leyva.      PAST MEDICAL HISTORY  Active Ambulatory Problems     Diagnosis Date Noted   • Acute MI, inferior wall (CMS/HCC) 08/20/2018   • BPH (benign prostatic hyperplasia) 10/14/2013   • DDD (degenerative disc disease), cervical 10/01/2013   • Disequilibrium 12/02/2013   • Dyslipidemia 10/14/2013   • Hematochezia 11/30/2012   • Inguinal hernia 11/30/2012   • TIA (transient ischemic attack) 12/02/2013   • Tobacco abuse 10/14/2013   • Vertigo 10/14/2013   • Tricuspid regurgitation 08/20/2018   • Status post insertion of drug-eluting stent into right coronary artery for coronary artery disease 08/20/2018   • Heart palpitations 09/14/2018   • Dizziness 09/14/2018   • Coronary artery disease 05/10/2019   • Hyperlipidemia 05/10/2019   • Essential hypertension 05/10/2019   • Chronic fatigue 05/10/2019     Resolved Ambulatory Problems     Diagnosis Date Noted   • No Resolved Ambulatory Problems "     Past Medical History:   Diagnosis Date   • Bradycardia    • Cancer (CMS/HCC)    • Enlarged prostate    • Hypertension    • Hypotension    • Myocardial infarction (CMS/HCC)          PAST SURGICAL HISTORY  Past Surgical History:   Procedure Laterality Date   • CARDIAC CATHETERIZATION     • CARDIAC CATHETERIZATION N/A 8/20/2018    Procedure: Left Heart Cath;  Surgeon: Giovanny Leyva MD;  Location:  [facility];  Service: Cardiology   • CARDIAC CATHETERIZATION N/A 8/20/2018    Procedure: Stent CARRIE coronary;  Surgeon: Giovanny Leyva MD;  Location:  [facility];  Service: Cardiology   • CARDIAC CATHETERIZATION N/A 8/20/2018    Procedure: Coronary angiography;  Surgeon: Giovanny Leyva MD;  Location:  [facility];  Service: Cardiology   • CARDIAC CATHETERIZATION N/A 8/20/2018    Procedure: Left ventriculography;  Surgeon: Giovanny Leyva MD;  Location:  [facility];  Service: Cardiology   • CARDIAC CATHETERIZATION  8/20/2018    Procedure: Percutaneous Mechanical Thrombectomy;  Surgeon: Giovanny Leyva MD;  Location:  [facility];  Service: Cardiology   • NE RT/LT HEART CATHETERS N/A 8/20/2018    Procedure: Percutaneous Coronary Intervention;  Surgeon: Giovanny Leyva MD;  Location:  [facility];  Service: Cardiology   • SKIN BIOPSY     • SKIN CANCER EXCISION  2001    Basal cell carcinoma x 2, malignant melanoma x 1   • WRIST SURGERY Left 2007         FAMILY HISTORY  Family History   Problem Relation Age of Onset   • Heart attack Mother 60   • Heart disease Mother    • Hypertension Mother    • Diabetes Mother    • Heart attack Father 43   • Heart disease Father    • No Known Problems Daughter    • No Known Problems Daughter          SOCIAL HISTORY  Social History     Socioeconomic History   • Marital status: Single     Spouse name: Not on file   • Number of children: Not on  file   • Years of education: Not on file   • Highest education level: Not on file   Tobacco Use   • Smoking status: Current Every Day Smoker     Packs/day: 1.00     Types: Electronic Cigarette   • Smokeless tobacco: Never Used   • Tobacco comment: Quit smoking cigarettes 8/2018, now just uses ECig   Vaping Use   • Vaping Use: Every day   • Start date: 8/1/2018   • Substances: Nicotine   Substance and Sexual Activity   • Alcohol use: Yes     Comment: very little   • Drug use: No   • Sexual activity: Defer         ALLERGIES  Penicillins and Sertraline        REVIEW OF SYSTEMS  Review of Systems     Patient denies headache, neck pain, sore throat, fevers, chills, cough, shortness of breath, abdominal pain, vomiting, diarrhea, lower extremity pain, lower extremity swelling or focal neuro deficit    All systems reviewed and negative except for those discussed in HPI.     PHYSICAL EXAM    I have reviewed the triage vital signs and nursing notes.    ED Triage Vitals   Temp Heart Rate Resp BP SpO2   08/03/21 1047 08/03/21 1047 08/03/21 1047 08/03/21 1057 08/03/21 1047   96.5 °F (35.8 °C) 73 16 (!) 147/106 100 %      Temp src Heart Rate Source Patient Position BP Location FiO2 (%)   -- -- -- -- --              GENERAL: 65-year-old well developed, well nourished in no acute distress  HENT: NCAT, neck supple, trachea midline  EYES: no scleral icterus, PERRL, normal conjunctiva  CV: regular rhythm, regular rate, no murmur  RESPIRATORY: unlabored effort, CTAB  ABDOMEN: soft, non-tender, non-distended, bowel sounds present  MUSCULOSKELETAL: no gross deformity, no pedal edema, no calf tenderness  NEURO: alert,  sensory and motor function of extremities intact, speech clear, mental status normal  SKIN: warm, dry, no rash  PSYCH:  Appropriate mood and affect      PPE  Pt does not present with symptoms for COVID19; however, I was wearing a mask and goggles throughout all patient interaction.    Vital signs and nursing notes  reviewed.      LAB RESULTS  Recent Results (from the past 24 hour(s))   ECG 12 Lead    Collection Time: 08/03/21 10:50 AM   Result Value Ref Range    QT Interval 415 ms   Comprehensive Metabolic Panel    Collection Time: 08/03/21 11:26 AM    Specimen: Blood   Result Value Ref Range    Glucose 99 65 - 99 mg/dL    BUN 17 8 - 23 mg/dL    Creatinine 0.92 0.76 - 1.27 mg/dL    Sodium 138 136 - 145 mmol/L    Potassium 4.8 3.5 - 5.2 mmol/L    Chloride 105 98 - 107 mmol/L    CO2 26.6 22.0 - 29.0 mmol/L    Calcium 9.2 8.6 - 10.5 mg/dL    Total Protein 6.7 6.0 - 8.5 g/dL    Albumin 3.90 3.50 - 5.20 g/dL    ALT (SGPT) 16 1 - 41 U/L    AST (SGOT) 18 1 - 40 U/L    Alkaline Phosphatase 62 39 - 117 U/L    Total Bilirubin 0.5 0.0 - 1.2 mg/dL    eGFR Non African Amer 83 >60 mL/min/1.73    Globulin 2.8 gm/dL    A/G Ratio 1.4 g/dL    BUN/Creatinine Ratio 18.5 7.0 - 25.0    Anion Gap 6.4 5.0 - 15.0 mmol/L   Troponin    Collection Time: 08/03/21 11:26 AM    Specimen: Blood   Result Value Ref Range    Troponin T <0.010 0.000 - 0.030 ng/mL   Green Top (Gel)    Collection Time: 08/03/21 11:26 AM   Result Value Ref Range    Extra Tube Hold for add-ons.    Lavender Top    Collection Time: 08/03/21 11:26 AM   Result Value Ref Range    Extra Tube hold for add-on    Gold Top - SST    Collection Time: 08/03/21 11:26 AM   Result Value Ref Range    Extra Tube Hold for add-ons.    CBC Auto Differential    Collection Time: 08/03/21 11:26 AM    Specimen: Blood   Result Value Ref Range    WBC 7.42 3.40 - 10.80 10*3/mm3    RBC 4.97 4.14 - 5.80 10*6/mm3    Hemoglobin 14.5 13.0 - 17.7 g/dL    Hematocrit 43.1 37.5 - 51.0 %    MCV 86.7 79.0 - 97.0 fL    MCH 29.2 26.6 - 33.0 pg    MCHC 33.6 31.5 - 35.7 g/dL    RDW 13.7 12.3 - 15.4 %    RDW-SD 43.3 37.0 - 54.0 fl    MPV 9.7 6.0 - 12.0 fL    Platelets 214 140 - 450 10*3/mm3    Neutrophil % 73.8 42.7 - 76.0 %    Lymphocyte % 18.9 (L) 19.6 - 45.3 %    Monocyte % 5.7 5.0 - 12.0 %    Eosinophil % 0.7 0.3 - 6.2 %     Basophil % 0.4 0.0 - 1.5 %    Immature Grans % 0.5 0.0 - 0.5 %    Neutrophils, Absolute 5.48 1.70 - 7.00 10*3/mm3    Lymphocytes, Absolute 1.40 0.70 - 3.10 10*3/mm3    Monocytes, Absolute 0.42 0.10 - 0.90 10*3/mm3    Eosinophils, Absolute 0.05 0.00 - 0.40 10*3/mm3    Basophils, Absolute 0.03 0.00 - 0.20 10*3/mm3    Immature Grans, Absolute 0.04 0.00 - 0.05 10*3/mm3    nRBC 0.0 0.0 - 0.2 /100 WBC   Magnesium    Collection Time: 08/03/21 11:26 AM    Specimen: Blood   Result Value Ref Range    Magnesium 2.3 1.6 - 2.4 mg/dL   TSH    Collection Time: 08/03/21 11:26 AM    Specimen: Blood   Result Value Ref Range    TSH 0.901 0.270 - 4.200 uIU/mL   T4, Free    Collection Time: 08/03/21 11:26 AM    Specimen: Blood   Result Value Ref Range    Free T4 1.16 0.93 - 1.70 ng/dL   Urinalysis With Microscopic If Indicated (No Culture) - Urine, Clean Catch    Collection Time: 08/03/21 12:56 PM    Specimen: Urine, Clean Catch   Result Value Ref Range    Color, UA Yellow Yellow, Straw    Appearance, UA Clear Clear    pH, UA 6.0 5.0 - 8.0    Specific Gravity, UA 1.011 1.005 - 1.030    Glucose, UA Negative Negative    Ketones, UA Negative Negative    Bilirubin, UA Negative Negative    Blood, UA Negative Negative    Protein, UA Negative Negative    Leuk Esterase, UA Negative Negative    Nitrite, UA Negative Negative    Urobilinogen, UA 0.2 E.U./dL 0.2 - 1.0 E.U./dL   Troponin    Collection Time: 08/03/21  1:44 PM    Specimen: Blood   Result Value Ref Range    Troponin T <0.010 0.000 - 0.030 ng/mL   Holter Monitor - 72 Hour Up To 15 Days    Collection Time: 08/03/21  3:08 PM   Result Value Ref Range    Target HR (85%) 133 bpm    Max. Pred. HR (100%) 156 bpm       Ordered the above labs and independently reviewed the results.        RADIOLOGY  XR Chest 2 View    Result Date: 8/3/2021  TWO-VIEW CHEST  HISTORY: Chest pain. Occasional palpitations.  FINDINGS: The lungs are well-expanded and clear except for a calcified granuloma at the  left base medially. The heart and hilar structures are normal and there is no acute disease or change from 05/31/2020.  This report was finalized on 8/3/2021 2:38 PM by Dr. Randal Cifuentes M.D.        I ordered the above noted radiological studies. Independently reviewed by me and discussed with radiologist.  See dictation above for official radiology interpretation.      PROCEDURES    Procedures    EKG    EKG time: 1050  Rhythm/Rate: Sinus bradycardia at 53  No Acute Ischemia  Non-Specific ST-T changes    Unchanged compared to prior on 5/31/2020    Interpreted Contemporaneously by me.  Independently viewed by me      MEDICATIONS GIVEN IN ER    Medications   sodium chloride 0.9 % bolus 500 mL (0 mL Intravenous Stopped 8/3/21 1341)         PROGRESS, DATA ANALYSIS, CONSULTS, AND MEDICAL DECISION MAKING    All labs have been independently reviewed by me.  All radiology studies have been reviewed by me and discussed with radiologist dictating report.   EKG's independently reviewed by me.  Discussion below represents my analysis of pertinent findings related to patient's condition, differential diagnosis, treatment plan and final disposition.      ED Course as of Aug 03 2100   Tue Aug 03, 2021   1436 Patient's EKG, chest x-ray, 2 troponins and labs are all normal.    [GP]   1442 HEART SCORE:History #0(Highly suspicious 2, Moderately suspicious 1, Slightly or non-suspicious 0)ECG #0(Significant ST depression 2,  Nonspecific repol disturbance 1, Normal 0)Age #1(> or = 65 2, 46-65 1,  < or = 45 0)Risk factors #2(hypercholesterolemia, HTN, DM, smoking, pos fam hx, obesity)(> or = to 3 RF 2, 1 or 2 1, No risk factors 0)Troponin #0(> or = 3x normal limit 2, 1-3x normal limit 1, < or = Normal limit 0)HEART Score Key:Scores 0-3: 0.9-1.7% risk of adverse cardiac event. In the HEART Score study, these patients were discharged (0.99% in the retrospective study, 1.7% in the prospective study)Scores 4-6: 12-16.6% risk of adverse  cardiac event. In the HEART Score study, these patients were admitted to the hospital. (11.6% retrospective, 16.6% prospective)Scores =7: 50-65% risk of adverse cardiac event. In the HEART Score study, these patients were candidates for early invasive measures. (65.2% retrospective, 50.1% prospective)This patient's HEART score is 3    [GP]   1443 The patient has been in a sinus bradycardia in the upper 40s and 50s.  He is not on anything that should make him bradycardic.  His heart score is 3.  I will consult his cardiologist for further recommendation and care.    [GP]   1450 The patient states he has had palpitations on and off since his heart attack in 2018.  Thus I will order a Zio patch.    [GP]   1539 I discussed the case with Dr. Leyva from cardiology.  He would like us to place a monitor on the patient and encouraged the patient to restart his medications and he will follow the patient up in the office after that.  The patient currently is symptom-free with a Zio patch in place.  Advised him to restart his lisinopril and Lipitor and to wear the Zio patch and follow-up with Dr. Leyva as an outpatient.  Patient understands and agrees with the plan.    [GP]      ED Course User Index  [GP] Jimmy Quiroz MD           The differential diagnosis includes but is not limited to premature ventricular contractions, premature atrial contractions, supraventricular tachycardia, atrial fibrillation, atrial flutter, or sinus arrhythmia.        AS OF 21:00 EDT VITALS:    BP - 143/94  HR - 50  TEMP - 96.5 °F (35.8 °C)  02 SATS - 98%        DIAGNOSIS  Final diagnoses:   Atypical chest pain   Palpitations   Sinus bradycardia   Weakness         DISPOSITION  DISCHARGE    Patient discharged in stable condition.    Reviewed implications of results, diagnosis, meds, responsibility to follow up, warning signs and symptoms of possible worsening, potential complications and reasons to return to ER, including worsening symptoms,  passing out or fever.    Patient/Family voiced understanding of above instructions.    Discussed plan for discharge, as there is no emergent indication for admission.  Pt/family is agreeable and understands need for follow up and repeat testing.  Pt is aware that discharge does not mean that nothing is wrong but it indicates no emergency is present and they must continue care with follow-up as given below or physician of their choice.     FOLLOW-UP  Giovanny Leyva MD  [id number] [id number]  [phone]    Schedule an appointment as soon as possible for a visit                 EMR Dragon/Transcription disclaimer:   Much of this encounter note is an electronic transcription/translation of spoken language to printed text.        Jimmy Quiroz MD  08/03/21 2100

## 2021-08-03 NOTE — ED NOTES
Patient has on mask, nurse has on mask also and proper ppe.     Kayleen Sanchez, RN  08/03/21 1772

## 2021-08-09 LAB — QT INTERVAL: 415 MS

## 2021-09-01 LAB
MAXIMAL PREDICTED HEART RATE: 156 BPM
STRESS TARGET HR: 133 BPM

## 2021-09-01 PROCEDURE — 93248 EXT ECG>7D<15D REV&INTERPJ: CPT | Performed by: INTERNAL MEDICINE

## 2021-09-14 ENCOUNTER — TELEPHONE (OUTPATIENT)
Dept: CARDIOLOGY | Facility: CLINIC | Age: 66
End: 2021-09-14

## 2021-09-14 NOTE — TELEPHONE ENCOUNTER
Pt called wanting the results of his 8/3/21 Zio Monitor he had put on when he was in the ER. The results are in Epic and he can be reached at #[phone].    Thanks,  Mellissa

## 2021-10-12 RX ORDER — TAMSULOSIN HYDROCHLORIDE 0.4 MG/1
CAPSULE ORAL
Qty: 90 CAPSULE | Refills: 1 | Status: SHIPPED | OUTPATIENT
Start: 2021-10-12 | End: 2022-04-11 | Stop reason: SDUPTHER

## 2021-10-12 RX ORDER — LISINOPRIL 10 MG/1
10 TABLET ORAL DAILY
Qty: 90 TABLET | Refills: 3 | Status: SHIPPED | OUTPATIENT
Start: 2021-10-12 | End: 2021-10-12 | Stop reason: SDUPTHER

## 2021-10-12 RX ORDER — LISINOPRIL 10 MG/1
TABLET ORAL
Qty: 30 TABLET | Refills: 1 | Status: SHIPPED | OUTPATIENT
Start: 2021-10-12 | End: 2021-10-12 | Stop reason: SDUPTHER

## 2021-10-12 RX ORDER — LISINOPRIL 10 MG/1
10 TABLET ORAL DAILY
Qty: 90 TABLET | Refills: 3 | Status: SHIPPED | OUTPATIENT
Start: 2021-10-12 | End: 2022-08-24

## 2022-01-17 ENCOUNTER — OFFICE VISIT (OUTPATIENT)
Dept: FAMILY MEDICINE CLINIC | Facility: CLINIC | Age: 67
End: 2022-01-17

## 2022-01-17 VITALS
SYSTOLIC BLOOD PRESSURE: 118 MMHG | TEMPERATURE: 98.3 F | HEIGHT: 72 IN | OXYGEN SATURATION: 98 % | WEIGHT: 192 LBS | HEART RATE: 60 BPM | DIASTOLIC BLOOD PRESSURE: 68 MMHG | BODY MASS INDEX: 26.01 KG/M2

## 2022-01-17 DIAGNOSIS — E78.5 HYPERLIPIDEMIA, UNSPECIFIED HYPERLIPIDEMIA TYPE: ICD-10-CM

## 2022-01-17 DIAGNOSIS — L98.9 FACIAL SKIN LESION: Primary | ICD-10-CM

## 2022-01-17 DIAGNOSIS — Z12.5 SPECIAL SCREENING, PROSTATE CANCER: ICD-10-CM

## 2022-01-17 DIAGNOSIS — I10 ESSENTIAL HYPERTENSION: ICD-10-CM

## 2022-01-17 DIAGNOSIS — I25.118 CORONARY ARTERY DISEASE OF NATIVE ARTERY OF NATIVE HEART WITH STABLE ANGINA PECTORIS: ICD-10-CM

## 2022-01-17 PROCEDURE — 99203 OFFICE O/P NEW LOW 30 MIN: CPT | Performed by: INTERNAL MEDICINE

## 2022-01-17 NOTE — PROGRESS NOTES
Subjective   Flavio Frost is a 66 y.o. male.     Chief Complaint   Patient presents with   • Skin Cancer       History of Present Illness   Patient establishing new PMD and has not seen PMD for about 4 years.  Has history of MI and followed by cardiology.  Has history of malignant melanoma neck 2001 treated and a skin cancer on forehead treated with a laser but the lesion has recurred.    Follow-up for cholesterol.  Currently, has been feeling well without any myalgias, muscle aches, weakness, numbness, chest pain, short of breath or other issues.  Currently, is adherent with medication regimen of atorvastatin 40 mg and denies medication side effects. Is due for lab follow-up.    Follow-up for hypertension and CAD.  Currently, has been feeling well and asymptomatic without any headaches, vision changes, cough, chest pain, shortness of breath, swelling, focal neurologic deficit, memory loss or syncope.  Has been taking the medications regularly and adherent with the regimen lisinopril 10 mg and aspirin 81 mg.  Denies medication side effects and no significant interval events.        The following portions of the patient's history were reviewed and updated as appropriate: allergies, current medications, past family history, past medical history, past social history, past surgical history and problem list.    Depression Screen:  No flowsheet data found.    Past Medical History:   Diagnosis Date   • BPH (benign prostatic hyperplasia)    • Bradycardia    • Cancer (HCC)     skin cancer   • Coronary artery disease    • Enlarged prostate    • Hyperlipidemia    • Hypertension    • Hypotension    • Myocardial infarction (HCC)        Past Surgical History:   Procedure Laterality Date   • CARDIAC CATHETERIZATION     • CARDIAC CATHETERIZATION N/A 8/20/2018    Procedure: Left Heart Cath;  Surgeon: Giovanny Leyva MD;  Location: [facility] INVASIVE LOCATION;  Service: Cardiology   • CARDIAC CATHETERIZATION N/A 8/20/2018     Procedure: Stent CARRIE coronary;  Surgeon: Giovanny Leyva MD;  Location:  [facility] CATH INVASIVE LOCATION;  Service: Cardiology   • CARDIAC CATHETERIZATION N/A 2018    Procedure: Coronary angiography;  Surgeon: Giovanny Leyva MD;  Location:  [facility] CATH INVASIVE LOCATION;  Service: Cardiology   • CARDIAC CATHETERIZATION N/A 2018    Procedure: Left ventriculography;  Surgeon: Giovanny Leyva MD;  Location:  [facility] CATH INVASIVE LOCATION;  Service: Cardiology   • CARDIAC CATHETERIZATION  2018    Procedure: Percutaneous Mechanical Thrombectomy;  Surgeon: Giovanny Leyva MD;  Location:  [facility] CATH INVASIVE LOCATION;  Service: Cardiology   • IN RT/LT HEART CATHETERS N/A 2018    Procedure: Percutaneous Coronary Intervention;  Surgeon: Giovanny Leyva MD;  [facility] CATH [facility]VASIVE LOC[facility];  Service: Cardiology   • SKIN BIOPSY     • SKIN CANCER EXCISION      Basal cell carcinoma x 2, malignant melanoma x 1   • WRIST SURGERY Left        Family History   Problem Relation Age of Onset   • Heart attack Mother 60   • Heart disease Mother    • Hypertension Mother    • Diabetes Mother    • Heart attack Father 43   • Heart disease Father    • No Known Problems Daughter    • No Known Problems Daughter        Social History     Socioeconomic History   • Marital status: Single   Tobacco Use   • Smoking status: Former Smoker     Packs/day: 2.00     Years: 0.00     Pack years: 0.00     Types: Electronic Cigarette     Start date: 1972     Quit date: 2018     Years since quittin.0   • Smokeless tobacco: Never Used   • Tobacco comment: Quit smoking cigarettes 2018, now just uses ECig   Vaping Use   • Vaping Use: Every day   • Start date: 2018   • Substances: Nicotine   Substance and Sexual Activity   • Alcohol use: Yes     Alcohol/week: 0.0 standard drinks     Comment: Less than 1 drink a week   • Drug use: Never   • Sexual activity: Not Currently        Current Outpatient Medications   Medication Sig Dispense Refill   • aspirin 81 MG chewable tablet Chew 1 tablet Daily. 30 tablet 11   • atorvastatin (LIPITOR) 40 MG tablet Take 1 tablet by mouth Every Night. 90 tablet 2   • lisinopril (PRINIVIL,ZESTRIL) 10 MG tablet Take 1 tablet by mouth Daily. 90 tablet 3   • nitroglycerin (NITROSTAT) 0.4 MG SL tablet 1 under the tongue as needed for angina, may repeat q5mins for up three doses 45 tablet 11   • tamsulosin (FLOMAX) 0.4 MG capsule 24 hr capsule TAKE ONE CAPSULE BY MOUTH DAILY 90 capsule 1   • albuterol (PROVENTIL HFA;VENTOLIN HFA) 108 (90 Base) MCG/ACT inhaler Inhale 2 puffs Every 6 (Six) Hours As Needed for Wheezing. 1 inhaler 0     No current facility-administered medications for this visit.       Review of Systems   Constitutional: Negative for activity change, appetite change, fatigue, fever, unexpected weight gain and unexpected weight loss.   HENT: Negative for nosebleeds, rhinorrhea, trouble swallowing and voice change.    Eyes: Negative for visual disturbance.   Respiratory: Negative for cough, chest tightness, shortness of breath and wheezing.    Cardiovascular: Negative for chest pain, palpitations and leg swelling.   Gastrointestinal: Negative for abdominal pain, blood in stool, constipation, diarrhea, nausea, vomiting, GERD and indigestion.   Genitourinary: Negative for dysuria, frequency and hematuria.   Musculoskeletal: Negative for arthralgias, back pain and myalgias.   Skin: Negative for rash and wound.        Forehead with scabbed skin lesion on right.   Neurological: Negative for dizziness, tremors, weakness, light-headedness, numbness, headache and memory problem.   Hematological: Negative for adenopathy. Does not bruise/bleed easily.   Psychiatric/Behavioral: Negative for sleep disturbance and depressed mood. The patient is not nervous/anxious.        Objective   /68 (BP Location: Right arm, Patient Position: Sitting, Cuff Size:  "Adult)   Pulse 60   Temp 98.3 °F (36.8 °C) (Temporal)   Ht 182.9 cm (72.01\")   Wt 87.1 kg (192 lb)   SpO2 98%   BMI 26.03 kg/m²     Physical Exam  Vitals and nursing note reviewed.   Constitutional:       General: He is not in acute distress.     Appearance: He is well-developed. He is not diaphoretic.   HENT:      Head: Normocephalic and atraumatic.      Right Ear: External ear normal.      Left Ear: External ear normal.      Nose: Nose normal.   Eyes:      Conjunctiva/sclera: Conjunctivae normal.      Pupils: Pupils are equal, round, and reactive to light.   Neck:      Thyroid: No thyromegaly.      Trachea: No tracheal deviation.   Cardiovascular:      Rate and Rhythm: Normal rate and regular rhythm.      Heart sounds: Normal heart sounds. No murmur heard.  No friction rub. No gallop.    Pulmonary:      Effort: Pulmonary effort is normal. No respiratory distress.      Breath sounds: Normal breath sounds.   Abdominal:      General: Bowel sounds are normal.      Palpations: Abdomen is soft. There is no mass.      Tenderness: There is no abdominal tenderness. There is no guarding.   Musculoskeletal:         General: Normal range of motion.      Cervical back: Normal range of motion and neck supple.   Lymphadenopathy:      Cervical: No cervical adenopathy.   Skin:     General: Skin is warm and dry.      Capillary Refill: Capillary refill takes less than 2 seconds.      Findings: No rash.      Comments: Right forehead scabbed lesion 0.4 cm   Neurological:      Mental Status: He is alert and oriented to person, place, and time.      Motor: No abnormal muscle tone.      Deep Tendon Reflexes: Reflexes normal.   Psychiatric:         Behavior: Behavior normal.         Thought Content: Thought content normal.         Judgment: Judgment normal.         No results found for this or any previous visit (from the past 2016 hour(s)).  Assessment/Plan   Diagnoses and all orders for this visit:    1. Facial skin lesion " (Primary)  -     Ambulatory Referral to Dermatology    2. Essential hypertension  -     Comprehensive Metabolic Panel  -     Lipid Panel    3. Hyperlipidemia, unspecified hyperlipidemia type  -     Comprehensive Metabolic Panel  -     Lipid Panel    4. Coronary artery disease of native artery of native heart with stable angina pectoris (HCC)    5. Special screening, prostate cancer  -     PSA Screen      Annual wellness visit reviewed with patient.  All past history, medications, social history, and problem list were reviewed.  Discussed advanced directives and living will.  Patient has living will: Living will: no and information packet given to patient to complete at home and bring copy to office.  Discussed fall risk and precautions encourage removing throw rugs and using grab bars within the home and bathroom.  Will check the labs as ordered above to evaluate the blood sugars, kidney, liver, cholesterol for screening.  Discussed flu shot recommended to get the high-dose influenza vaccine annually in the fall.  The patient qualifies to receive the vaccine, but they have not yet received it.  Tdap, pneumonia, Covid-19, Shingrix vaccination series recommended.  Encourage follow-up with the eye doctor on annual basis for glaucoma evaluation.  Discussed weight and encouraged exercise as tolerated while following a healthy diet.  Colon cancer screening discussed and current status: colonoscopy discussed or cologuard discussed and will consider.  Discussed prostate screening for which he is to have PSA screening.  Follow up with current specialists as needed.           · COVID-19 Precautions - Patient was compliant in wearing a mask. When I saw the patient, I used appropriate personal protective equipment (PPE) including mask and eye shield (standard procedure).  Additionally, I used gown and gloves if indicated.  Hand hygiene was completed before and after seeing the patient.  · Dictated utilizing Dragon Dictation

## 2022-01-18 LAB
ALBUMIN SERPL-MCNC: 4.6 G/DL (ref 3.8–4.8)
ALBUMIN/GLOB SERPL: 2 {RATIO} (ref 1.2–2.2)
ALP SERPL-CCNC: 72 IU/L (ref 44–121)
ALT SERPL-CCNC: 21 IU/L (ref 0–44)
AST SERPL-CCNC: 21 IU/L (ref 0–40)
BILIRUB SERPL-MCNC: 0.4 MG/DL (ref 0–1.2)
BUN SERPL-MCNC: 10 MG/DL (ref 8–27)
BUN/CREAT SERPL: 11 (ref 10–24)
CALCIUM SERPL-MCNC: 9.6 MG/DL (ref 8.6–10.2)
CHLORIDE SERPL-SCNC: 103 MMOL/L (ref 96–106)
CHOLEST SERPL-MCNC: 125 MG/DL (ref 100–199)
CO2 SERPL-SCNC: 26 MMOL/L (ref 20–29)
CREAT SERPL-MCNC: 0.95 MG/DL (ref 0.76–1.27)
GLOBULIN SER CALC-MCNC: 2.3 G/DL (ref 1.5–4.5)
GLUCOSE SERPL-MCNC: 89 MG/DL (ref 65–99)
HDLC SERPL-MCNC: 44 MG/DL
LDLC SERPL CALC-MCNC: 68 MG/DL (ref 0–99)
POTASSIUM SERPL-SCNC: 4.4 MMOL/L (ref 3.5–5.2)
PROT SERPL-MCNC: 6.9 G/DL (ref 6–8.5)
PSA SERPL-MCNC: 1 NG/ML (ref 0–4)
SODIUM SERPL-SCNC: 141 MMOL/L (ref 134–144)
TRIGL SERPL-MCNC: 58 MG/DL (ref 0–149)
VLDLC SERPL CALC-MCNC: 13 MG/DL (ref 5–40)

## 2022-01-20 ENCOUNTER — PATIENT ROUNDING (BHMG ONLY) (OUTPATIENT)
Dept: FAMILY MEDICINE CLINIC | Facility: CLINIC | Age: 67
End: 2022-01-20

## 2022-01-20 NOTE — PROGRESS NOTES
• A My-Chart message has been sent to the patient for PATIENT ROUNDING with [facility]

## 2022-02-18 RX ORDER — ATORVASTATIN CALCIUM 40 MG/1
TABLET, FILM COATED ORAL
Qty: 90 TABLET | Refills: 2 | Status: SHIPPED | OUTPATIENT
Start: 2022-02-18 | End: 2022-10-13 | Stop reason: SDUPTHER

## 2022-04-11 RX ORDER — TAMSULOSIN HYDROCHLORIDE 0.4 MG/1
CAPSULE ORAL
Qty: 90 CAPSULE | Refills: 1 | OUTPATIENT
Start: 2022-04-11

## 2022-04-11 RX ORDER — TAMSULOSIN HYDROCHLORIDE 0.4 MG/1
1 CAPSULE ORAL DAILY
Qty: 90 CAPSULE | Refills: 1 | Status: SHIPPED | OUTPATIENT
Start: 2022-04-11 | End: 2022-08-23

## 2022-04-11 NOTE — TELEPHONE ENCOUNTER
Rx Refill Note  Requested Prescriptions     Pending Prescriptions Disp Refills   • tamsulosin (FLOMAX) 0.4 MG capsule 24 hr capsule 90 capsule 1     Sig: Take 1 capsule by mouth Daily.      Last office visit with prescribing clinician: 1/17/2022      Next office visit with prescribing clinician: Visit date not found            Karen Sun MA  04/11/22, 14:24 EDT

## 2022-04-11 NOTE — TELEPHONE ENCOUNTER
Caller: Flavio Frost    Relationship: Self    Best call back number: [phone]    Requested Prescriptions:   Requested Prescriptions     Pending Prescriptions Disp Refills   • tamsulosin (FLOMAX) 0.4 MG capsule 24 hr capsule 90 capsule 1     Sig: Take 1 capsule by mouth Daily.        Pharmacy where request should be sent: [facility] [address] - [phone]  - [phone]     Additional details provided by patient: PATIENT STATES THAT HE HAS 1 PILL LEFT AND NEEDS IT FILLED ASAP     PATIENT STATES THAT HE HAS BEEN GETTING THIS FILLED FROM DR. SANTOS FOR OVER A YEAR AND WAS TOLD THAT THEY WOULD NOT REFILL IT AND TO ASK HIS PCP.     Does the patient have less than a 3 day supply:  [x] Yes  [] No    Farzaneh Min Rep   04/11/22 13:57 EDT

## 2022-05-05 ENCOUNTER — TELEPHONE (OUTPATIENT)
Dept: FAMILY MEDICINE CLINIC | Facility: CLINIC | Age: 67
End: 2022-05-05

## 2022-05-05 NOTE — TELEPHONE ENCOUNTER
Caller: Flavio Frost    Relationship: Self    Best call back number: [phone]    What is the best time to reach you: ANYTIME    Who are you requesting to speak with (clinical staff, provider,  specific staff member): PRACTICE MANAGER     Do you know the name of the person who called: N/A    What was the call regarding: DISCUSSING EXPERIENCE OF RECENT VISIT    Do you require a callback: YES

## 2022-08-16 ENCOUNTER — TELEPHONE (OUTPATIENT)
Dept: FAMILY MEDICINE CLINIC | Facility: CLINIC | Age: 67
End: 2022-08-16

## 2022-08-16 NOTE — TELEPHONE ENCOUNTER
Caller: Flavio Frost    Relationship: Self    Best call back number: [phone]     What is the best time to reach you: ANYTIME    Who are you requesting to speak with (clinical staff, provider,  specific staff member): CLINICAL    What was the call regarding: PATIENT CALLING WANTING TO WHY HE IS NEEDING TO SCHEDULE A AWV APPOINTMENT AND WHAT THE VISIT WOULD BE FOR     Do you require a callback: YES

## 2022-08-23 RX ORDER — TAMSULOSIN HYDROCHLORIDE 0.4 MG/1
CAPSULE ORAL
Qty: 90 CAPSULE | Refills: 1 | Status: SHIPPED | OUTPATIENT
Start: 2022-08-23 | End: 2023-03-22 | Stop reason: SDUPTHER

## 2022-08-23 NOTE — TELEPHONE ENCOUNTER
Rx Refill Note  Requested Prescriptions     Pending Prescriptions Disp Refills   • tamsulosin (FLOMAX) 0.4 MG capsule 24 hr capsule [Pharmacy Med Name: TAMSULOSIN HCL 0.4 MG CAPSULE] 90 capsule 1     Sig: TAKE ONE CAPSULE BY MOUTH DAILY      Last office visit with prescribing clinician: 1/17/2022      Next office visit with prescribing clinician: 9/1/2022

## 2022-08-24 RX ORDER — LISINOPRIL 10 MG/1
TABLET ORAL
Qty: 90 TABLET | Refills: 3 | Status: SHIPPED | OUTPATIENT
Start: 2022-08-24 | End: 2022-10-13 | Stop reason: SDUPTHER

## 2022-08-27 ENCOUNTER — HOSPITAL ENCOUNTER (OUTPATIENT)
Facility: HOSPITAL | Age: 67
Setting detail: OBSERVATION
Discharge: HOME | End: 2022-08-28
Attending: EMERGENCY MEDICINE | Admitting: EMERGENCY MEDICINE

## 2022-08-27 ENCOUNTER — APPOINTMENT (OUTPATIENT)
Dept: GENERAL RADIOLOGY | Facility: HOSPITAL | Age: 67
End: 2022-08-27

## 2022-08-27 DIAGNOSIS — R07.9 CHEST PAIN, UNSPECIFIED TYPE: Primary | ICD-10-CM

## 2022-08-27 DIAGNOSIS — I25.2 HISTORY OF ST ELEVATION MYOCARDIAL INFARCTION (STEMI): ICD-10-CM

## 2022-08-27 PROBLEM — R07.89 CHEST DISCOMFORT: Status: ACTIVE | Noted: 2022-08-27

## 2022-08-27 LAB
ALBUMIN SERPL-MCNC: 4.1 G/DL (ref 3.5–5.2)
ALBUMIN/GLOB SERPL: 1.9 G/DL
ALP SERPL-CCNC: 63 U/L (ref 39–117)
ALT SERPL W P-5'-P-CCNC: 26 U/L (ref 1–41)
ANION GAP SERPL CALCULATED.3IONS-SCNC: 12 MMOL/L (ref 5–15)
AST SERPL-CCNC: 25 U/L (ref 1–40)
BASOPHILS # BLD AUTO: 0.03 10*3/MM3 (ref 0–0.2)
BASOPHILS NFR BLD AUTO: 0.4 % (ref 0–1.5)
BILIRUB SERPL-MCNC: 0.4 MG/DL (ref 0–1.2)
BUN SERPL-MCNC: 10 MG/DL (ref 8–23)
BUN/CREAT SERPL: 11.1 (ref 7–25)
CALCIUM SPEC-SCNC: 9.2 MG/DL (ref 8.6–10.5)
CHLORIDE SERPL-SCNC: 103 MMOL/L (ref 98–107)
CO2 SERPL-SCNC: 27 MMOL/L (ref 22–29)
CREAT SERPL-MCNC: 0.9 MG/DL (ref 0.76–1.27)
DEPRECATED RDW RBC AUTO: 43.6 FL (ref 37–54)
EGFRCR SERPLBLD CKD-EPI 2021: 94.8 ML/MIN/1.73
EOSINOPHIL # BLD AUTO: 0.08 10*3/MM3 (ref 0–0.4)
EOSINOPHIL NFR BLD AUTO: 1 % (ref 0.3–6.2)
ERYTHROCYTE [DISTWIDTH] IN BLOOD BY AUTOMATED COUNT: 13.9 % (ref 12.3–15.4)
GLOBULIN UR ELPH-MCNC: 2.2 GM/DL
GLUCOSE SERPL-MCNC: 84 MG/DL (ref 65–99)
HCT VFR BLD AUTO: 40.3 % (ref 37.5–51)
HGB BLD-MCNC: 13.7 G/DL (ref 13–17.7)
HOLD SPECIMEN: NORMAL
HOLD SPECIMEN: NORMAL
IMM GRANULOCYTES # BLD AUTO: 0.02 10*3/MM3 (ref 0–0.05)
IMM GRANULOCYTES NFR BLD AUTO: 0.3 % (ref 0–0.5)
LYMPHOCYTES # BLD AUTO: 2.66 10*3/MM3 (ref 0.7–3.1)
LYMPHOCYTES NFR BLD AUTO: 34.6 % (ref 19.6–45.3)
MCH RBC QN AUTO: 29.4 PG (ref 26.6–33)
MCHC RBC AUTO-ENTMCNC: 34 G/DL (ref 31.5–35.7)
MCV RBC AUTO: 86.5 FL (ref 79–97)
MONOCYTES # BLD AUTO: 0.57 10*3/MM3 (ref 0.1–0.9)
MONOCYTES NFR BLD AUTO: 7.4 % (ref 5–12)
NEUTROPHILS NFR BLD AUTO: 4.32 10*3/MM3 (ref 1.7–7)
NEUTROPHILS NFR BLD AUTO: 56.3 % (ref 42.7–76)
NRBC BLD AUTO-RTO: 0 /100 WBC (ref 0–0.2)
NT-PROBNP SERPL-MCNC: 32.3 PG/ML (ref 0–900)
PLATELET # BLD AUTO: 203 10*3/MM3 (ref 140–450)
PMV BLD AUTO: 10.2 FL (ref 6–12)
POTASSIUM SERPL-SCNC: 4.1 MMOL/L (ref 3.5–5.2)
PROT SERPL-MCNC: 6.3 G/DL (ref 6–8.5)
RBC # BLD AUTO: 4.66 10*6/MM3 (ref 4.14–5.8)
SARS-COV-2 RNA RESP QL NAA+PROBE: NOT DETECTED
SODIUM SERPL-SCNC: 142 MMOL/L (ref 136–145)
TROPONIN T SERPL-MCNC: <0.01 NG/ML (ref 0–0.03)
TROPONIN T SERPL-MCNC: <0.01 NG/ML (ref 0–0.03)
WBC NRBC COR # BLD: 7.68 10*3/MM3 (ref 3.4–10.8)
WHOLE BLOOD HOLD COAG: NORMAL
WHOLE BLOOD HOLD SPECIMEN: NORMAL

## 2022-08-27 PROCEDURE — G0378 HOSPITAL OBSERVATION PER HR: HCPCS

## 2022-08-27 PROCEDURE — 99284 EMERGENCY DEPT VISIT MOD MDM: CPT

## 2022-08-27 PROCEDURE — 80053 COMPREHEN METABOLIC PANEL: CPT | Performed by: NURSE PRACTITIONER

## 2022-08-27 PROCEDURE — U0005 INFEC AGEN DETEC AMPLI PROBE: HCPCS | Performed by: NURSE PRACTITIONER

## 2022-08-27 PROCEDURE — 71045 X-RAY EXAM CHEST 1 VIEW: CPT

## 2022-08-27 PROCEDURE — 93005 ELECTROCARDIOGRAM TRACING: CPT

## 2022-08-27 PROCEDURE — 85025 COMPLETE CBC W/AUTO DIFF WBC: CPT | Performed by: NURSE PRACTITIONER

## 2022-08-27 PROCEDURE — 36415 COLL VENOUS BLD VENIPUNCTURE: CPT

## 2022-08-27 PROCEDURE — U0003 INFECTIOUS AGENT DETECTION BY NUCLEIC ACID (DNA OR RNA); SEVERE ACUTE RESPIRATORY SYNDROME CORONAVIRUS 2 (SARS-COV-2) (CORONAVIRUS DISEASE [COVID-19]), AMPLIFIED PROBE TECHNIQUE, MAKING USE OF HIGH THROUGHPUT TECHNOLOGIES AS DESCRIBED BY CMS-2020-01-R: HCPCS | Performed by: NURSE PRACTITIONER

## 2022-08-27 PROCEDURE — 96360 HYDRATION IV INFUSION INIT: CPT

## 2022-08-27 PROCEDURE — 83880 ASSAY OF NATRIURETIC PEPTIDE: CPT | Performed by: NURSE PRACTITIONER

## 2022-08-27 PROCEDURE — 84484 ASSAY OF TROPONIN QUANT: CPT | Performed by: NURSE PRACTITIONER

## 2022-08-27 PROCEDURE — 93010 ELECTROCARDIOGRAM REPORT: CPT | Performed by: INTERNAL MEDICINE

## 2022-08-27 RX ORDER — ONDANSETRON 4 MG/1
4 TABLET, FILM COATED ORAL EVERY 6 HOURS PRN
Status: DISCONTINUED | OUTPATIENT
Start: 2022-08-27 | End: 2022-08-28 | Stop reason: HOSPADM

## 2022-08-27 RX ORDER — SODIUM CHLORIDE 0.9 % (FLUSH) 0.9 %
10 SYRINGE (ML) INJECTION EVERY 12 HOURS SCHEDULED
Status: DISCONTINUED | OUTPATIENT
Start: 2022-08-27 | End: 2022-08-28 | Stop reason: HOSPADM

## 2022-08-27 RX ORDER — ASPIRIN 81 MG/1
81 TABLET, CHEWABLE ORAL NIGHTLY
Status: DISCONTINUED | OUTPATIENT
Start: 2022-08-28 | End: 2022-08-28 | Stop reason: HOSPADM

## 2022-08-27 RX ORDER — ONDANSETRON 2 MG/ML
4 INJECTION INTRAMUSCULAR; INTRAVENOUS EVERY 6 HOURS PRN
Status: DISCONTINUED | OUTPATIENT
Start: 2022-08-27 | End: 2022-08-28 | Stop reason: HOSPADM

## 2022-08-27 RX ORDER — CHOLECALCIFEROL (VITAMIN D3) 125 MCG
5 CAPSULE ORAL NIGHTLY PRN
Status: DISCONTINUED | OUTPATIENT
Start: 2022-08-27 | End: 2022-08-28 | Stop reason: HOSPADM

## 2022-08-27 RX ORDER — NITROGLYCERIN 0.4 MG/1
0.4 TABLET SUBLINGUAL
Status: DISCONTINUED | OUTPATIENT
Start: 2022-08-27 | End: 2022-08-28 | Stop reason: HOSPADM

## 2022-08-27 RX ORDER — SODIUM CHLORIDE, SODIUM LACTATE, POTASSIUM CHLORIDE, CALCIUM CHLORIDE 600; 310; 30; 20 MG/100ML; MG/100ML; MG/100ML; MG/100ML
100 INJECTION, SOLUTION INTRAVENOUS CONTINUOUS
Status: DISCONTINUED | OUTPATIENT
Start: 2022-08-28 | End: 2022-08-28 | Stop reason: HOSPADM

## 2022-08-27 RX ORDER — SODIUM CHLORIDE 0.9 % (FLUSH) 0.9 %
10 SYRINGE (ML) INJECTION AS NEEDED
Status: DISCONTINUED | OUTPATIENT
Start: 2022-08-27 | End: 2022-08-28 | Stop reason: HOSPADM

## 2022-08-27 RX ORDER — ATORVASTATIN CALCIUM 20 MG/1
40 TABLET, FILM COATED ORAL NIGHTLY
Status: DISCONTINUED | OUTPATIENT
Start: 2022-08-27 | End: 2022-08-28 | Stop reason: HOSPADM

## 2022-08-27 RX ORDER — TAMSULOSIN HYDROCHLORIDE 0.4 MG/1
0.4 CAPSULE ORAL NIGHTLY
Status: DISCONTINUED | OUTPATIENT
Start: 2022-08-27 | End: 2022-08-28 | Stop reason: HOSPADM

## 2022-08-27 RX ORDER — ASPIRIN 81 MG/1
324 TABLET, CHEWABLE ORAL ONCE
Status: COMPLETED | OUTPATIENT
Start: 2022-08-27 | End: 2022-08-27

## 2022-08-27 RX ORDER — HYDROCODONE BITARTRATE AND ACETAMINOPHEN 5; 325 MG/1; MG/1
TABLET ORAL
COMMUNITY
Start: 2022-07-25 | End: 2022-09-01

## 2022-08-27 RX ORDER — ACETAMINOPHEN 325 MG/1
650 TABLET ORAL EVERY 4 HOURS PRN
Status: DISCONTINUED | OUTPATIENT
Start: 2022-08-27 | End: 2022-08-28 | Stop reason: HOSPADM

## 2022-08-27 RX ORDER — LISINOPRIL 10 MG/1
10 TABLET ORAL NIGHTLY
Status: DISCONTINUED | OUTPATIENT
Start: 2022-08-27 | End: 2022-08-28 | Stop reason: HOSPADM

## 2022-08-27 RX ADMIN — ASPIRIN 324 MG: 81 TABLET, CHEWABLE ORAL at 18:12

## 2022-08-27 RX ADMIN — ATORVASTATIN CALCIUM 40 MG: 20 TABLET, FILM COATED ORAL at 23:12

## 2022-08-27 RX ADMIN — Medication 10 ML: at 23:13

## 2022-08-27 RX ADMIN — TAMSULOSIN HYDROCHLORIDE 0.4 MG: 0.4 CAPSULE ORAL at 23:12

## 2022-08-27 RX ADMIN — SODIUM CHLORIDE, POTASSIUM CHLORIDE, SODIUM LACTATE AND CALCIUM CHLORIDE 100 ML/HR: 600; 310; 30; 20 INJECTION, SOLUTION INTRAVENOUS at 23:12

## 2022-08-27 RX ADMIN — LISINOPRIL 10 MG: 10 TABLET ORAL at 23:12

## 2022-08-28 ENCOUNTER — APPOINTMENT (OUTPATIENT)
Dept: NUCLEAR MEDICINE | Facility: HOSPITAL | Age: 67
End: 2022-08-28

## 2022-08-28 ENCOUNTER — READMISSION MANAGEMENT (OUTPATIENT)
Dept: CALL CENTER | Facility: HOSPITAL | Age: 67
End: 2022-08-28

## 2022-08-28 ENCOUNTER — APPOINTMENT (OUTPATIENT)
Dept: CT IMAGING | Facility: HOSPITAL | Age: 67
End: 2022-08-28

## 2022-08-28 VITALS
SYSTOLIC BLOOD PRESSURE: 112 MMHG | DIASTOLIC BLOOD PRESSURE: 86 MMHG | BODY MASS INDEX: 26.41 KG/M2 | TEMPERATURE: 98.1 F | HEIGHT: 72 IN | RESPIRATION RATE: 14 BRPM | OXYGEN SATURATION: 98 % | WEIGHT: 195 LBS | HEART RATE: 55 BPM

## 2022-08-28 LAB
ANION GAP SERPL CALCULATED.3IONS-SCNC: 8.7 MMOL/L (ref 5–15)
BH CV NUCLEAR PRIOR STUDY: 3
BH CV REST NUCLEAR ISOTOPE DOSE: 10.6 MCI
BH CV STRESS DURATION MIN STAGE 1: 3
BH CV STRESS DURATION SEC STAGE 1: 0
BH CV STRESS GRADE STAGE 1: 10
BH CV STRESS METS STAGE 1: 5
BH CV STRESS NUCLEAR ISOTOPE DOSE: 33.6 MCI
BH CV STRESS PROTOCOL 1: NORMAL
BH CV STRESS RECOVERY BP: NORMAL MMHG
BH CV STRESS RECOVERY HR: 100 BPM
BH CV STRESS SPEED STAGE 1: 1.7
BH CV STRESS STAGE 1: 1
BUN SERPL-MCNC: 10 MG/DL (ref 8–23)
BUN/CREAT SERPL: 13 (ref 7–25)
CALCIUM SPEC-SCNC: 8.6 MG/DL (ref 8.6–10.5)
CHLORIDE SERPL-SCNC: 109 MMOL/L (ref 98–107)
CHOLEST SERPL-MCNC: 95 MG/DL (ref 0–200)
CO2 SERPL-SCNC: 24.3 MMOL/L (ref 22–29)
CREAT SERPL-MCNC: 0.77 MG/DL (ref 0.76–1.27)
DEPRECATED RDW RBC AUTO: 43.2 FL (ref 37–54)
EGFRCR SERPLBLD CKD-EPI 2021: 99.4 ML/MIN/1.73
ERYTHROCYTE [DISTWIDTH] IN BLOOD BY AUTOMATED COUNT: 13.6 % (ref 12.3–15.4)
GLUCOSE SERPL-MCNC: 95 MG/DL (ref 65–99)
HBA1C MFR BLD: 5.6 % (ref 4.8–5.6)
HCT VFR BLD AUTO: 40.4 % (ref 37.5–51)
HDLC SERPL-MCNC: 36 MG/DL (ref 40–60)
HGB BLD-MCNC: 13.3 G/DL (ref 13–17.7)
LDLC SERPL CALC-MCNC: 44 MG/DL (ref 0–100)
LDLC/HDLC SERPL: 1.24 {RATIO}
LV EF NUC BP: 60 %
MAXIMAL PREDICTED HEART RATE: 155 BPM
MCH RBC QN AUTO: 29 PG (ref 26.6–33)
MCHC RBC AUTO-ENTMCNC: 32.9 G/DL (ref 31.5–35.7)
MCV RBC AUTO: 88 FL (ref 79–97)
PERCENT MAX PREDICTED HR: 89.03 %
PLATELET # BLD AUTO: 199 10*3/MM3 (ref 140–450)
PMV BLD AUTO: 10.5 FL (ref 6–12)
POTASSIUM SERPL-SCNC: 4.2 MMOL/L (ref 3.5–5.2)
RBC # BLD AUTO: 4.59 10*6/MM3 (ref 4.14–5.8)
SODIUM SERPL-SCNC: 142 MMOL/L (ref 136–145)
STRESS BASELINE BP: NORMAL MMHG
STRESS BASELINE HR: 56 BPM
STRESS PERCENT HR: 105 %
STRESS POST PEAK BP: NORMAL MMHG
STRESS POST PEAK HR: 138 BPM
STRESS TARGET HR: 132 BPM
TRIGL SERPL-MCNC: 72 MG/DL (ref 0–150)
TROPONIN T SERPL-MCNC: <0.01 NG/ML (ref 0–0.03)
VLDLC SERPL-MCNC: 15 MG/DL (ref 5–40)
WBC NRBC COR # BLD: 6.2 10*3/MM3 (ref 3.4–10.8)

## 2022-08-28 PROCEDURE — G0378 HOSPITAL OBSERVATION PER HR: HCPCS

## 2022-08-28 PROCEDURE — A9500 TC99M SESTAMIBI: HCPCS | Performed by: EMERGENCY MEDICINE

## 2022-08-28 PROCEDURE — 80048 BASIC METABOLIC PNL TOTAL CA: CPT | Performed by: PHYSICIAN ASSISTANT

## 2022-08-28 PROCEDURE — 85027 COMPLETE CBC AUTOMATED: CPT | Performed by: PHYSICIAN ASSISTANT

## 2022-08-28 PROCEDURE — 72125 CT NECK SPINE W/O DYE: CPT

## 2022-08-28 PROCEDURE — 99213 OFFICE O/P EST LOW 20 MIN: CPT | Performed by: INTERNAL MEDICINE

## 2022-08-28 PROCEDURE — 84484 ASSAY OF TROPONIN QUANT: CPT | Performed by: PHYSICIAN ASSISTANT

## 2022-08-28 PROCEDURE — 96360 HYDRATION IV INFUSION INIT: CPT

## 2022-08-28 PROCEDURE — 78452 HT MUSCLE IMAGE SPECT MULT: CPT

## 2022-08-28 PROCEDURE — 80061 LIPID PANEL: CPT | Performed by: PHYSICIAN ASSISTANT

## 2022-08-28 PROCEDURE — 0 TECHNETIUM SESTAMIBI: Performed by: EMERGENCY MEDICINE

## 2022-08-28 PROCEDURE — 96361 HYDRATE IV INFUSION ADD-ON: CPT

## 2022-08-28 PROCEDURE — 83036 HEMOGLOBIN GLYCOSYLATED A1C: CPT | Performed by: PHYSICIAN ASSISTANT

## 2022-08-28 PROCEDURE — 93018 CV STRESS TEST I&R ONLY: CPT | Performed by: INTERNAL MEDICINE

## 2022-08-28 PROCEDURE — 78452 HT MUSCLE IMAGE SPECT MULT: CPT | Performed by: INTERNAL MEDICINE

## 2022-08-28 PROCEDURE — 93017 CV STRESS TEST TRACING ONLY: CPT

## 2022-08-28 RX ADMIN — TECHNETIUM TC 99M SESTAMIBI 1 DOSE: 1 INJECTION INTRAVENOUS at 10:45

## 2022-08-28 RX ADMIN — TECHNETIUM TC 99M SESTAMIBI 1 DOSE: 1 INJECTION INTRAVENOUS at 09:45

## 2022-08-28 RX ADMIN — Medication 10 ML: at 09:24

## 2022-08-28 NOTE — OUTREACH NOTE
Prep Survey    Flowsheet Row Responses   [facility] patient discharged from? [place]   Is LACE score < 7 ? Yes   Emergency Room discharge w/ pulse ox? No   Eligibility [facility]   Date of Admission 08/27/22   Date of Discharge 08/28/22   Discharge Disposition Home or Self Care   Discharge diagnosis Chest discomfort   Does the patient have one of the following disease processes/diagnoses(primary or secondary)? Other   Does the patient have Home health ordered? No   Is there a DME ordered? No   Prep survey completed? Yes          EVELINE MAGAÑA - Registered Nurse

## 2022-08-29 ENCOUNTER — TRANSITIONAL CARE MANAGEMENT TELEPHONE ENCOUNTER (OUTPATIENT)
Dept: CALL CENTER | Facility: HOSPITAL | Age: 67
End: 2022-08-29

## 2022-08-29 LAB — QT INTERVAL: 388 MS

## 2022-08-29 NOTE — OUTREACH NOTE
Call Center TCM Note    Flowsheet Row Responses   [facility] [place] patient discharged from? [facility]   Does the patient have one of the following disease processes/diagnoses(primary or secondary)? Other   TCM attempt successful? Yes   Discharge diagnosis Chest discomfort   Meds reviewed with patient/caregiver? Yes   Does the patient have all medications ordered at discharge? N/A   Is the patient taking all medications as directed (includes completed medication regime)? Yes   Does the patient have a primary care provider?  Yes   Does the patient have an appointment with their PCP within 7 days of discharge? Yes   Comments regarding PCP Pt is sched with PCP Dr Link on 09/01/2022.   Has the patient kept scheduled appointments due by today? N/A   Has home health visited the patient within 72 hours of discharge? N/A   Psychosocial issues? No   Did the patient receive a copy of their discharge instructions? Yes   Nursing interventions Reviewed instructions with patient   What is the patient's perception of their health status since discharge? Improving   Is the patient/caregiver able to teach back signs and symptoms related to disease process for when to call PCP? Yes   Is the patient/caregiver able to teach back signs and symptoms related to disease process for when to call 911? Yes   Is the patient/caregiver able to teach back the hierarchy of who to call/visit for symptoms/problems? PCP, Specialist, Home health nurse, Urgent Care, ED, 911 Yes   If the patient is a current smoker, are they able to teach back resources for cessation? Not a smoker   TCM call completed? Yes   Wrap up additional comments Pt doing very well, feels great. No further chest or arm pain. No changes made to pt medications at d/c. Pt does have some questions for PCP re: some of his lab results. Pt is sched with PCP Dr Link on 09/01/2022 for WELLNESS visit but this could serve instead as TCM APPT if provider so chooses.           Carmen Schuler  ROD Man    8/29/2022, 12:45 EDT

## 2022-09-01 ENCOUNTER — OFFICE VISIT (OUTPATIENT)
Dept: FAMILY MEDICINE CLINIC | Facility: CLINIC | Age: 67
End: 2022-09-01

## 2022-09-01 VITALS
BODY MASS INDEX: 26.01 KG/M2 | SYSTOLIC BLOOD PRESSURE: 122 MMHG | HEART RATE: 66 BPM | WEIGHT: 192 LBS | TEMPERATURE: 98.9 F | OXYGEN SATURATION: 97 % | HEIGHT: 72 IN | DIASTOLIC BLOOD PRESSURE: 82 MMHG

## 2022-09-01 DIAGNOSIS — I10 ESSENTIAL HYPERTENSION: ICD-10-CM

## 2022-09-01 DIAGNOSIS — Z28.21 COVID-19 VACCINATION DECLINED: ICD-10-CM

## 2022-09-01 DIAGNOSIS — Z13.6 ENCOUNTER FOR ABDOMINAL AORTIC ANEURYSM (AAA) SCREENING: ICD-10-CM

## 2022-09-01 DIAGNOSIS — Z00.00 MEDICARE ANNUAL WELLNESS VISIT, SUBSEQUENT: Primary | ICD-10-CM

## 2022-09-01 DIAGNOSIS — Z12.11 SCREENING FOR COLON CANCER: ICD-10-CM

## 2022-09-01 DIAGNOSIS — E78.5 HYPERLIPIDEMIA, UNSPECIFIED HYPERLIPIDEMIA TYPE: ICD-10-CM

## 2022-09-01 PROCEDURE — 1170F FXNL STATUS ASSESSED: CPT | Performed by: INTERNAL MEDICINE

## 2022-09-01 PROCEDURE — 1159F MED LIST DOCD IN RCRD: CPT | Performed by: INTERNAL MEDICINE

## 2022-09-01 PROCEDURE — G0402 INITIAL PREVENTIVE EXAM: HCPCS | Performed by: INTERNAL MEDICINE

## 2022-09-01 NOTE — PROGRESS NOTES
Subsequent Medicare Wellness Visit   The ABC's of the Annual Wellness Visit    Chief Complaint   Patient presents with   • Annual Exam     Medicare          HPI:  Flavio Frost, -1955, is a 66 y.o. male who presents for a Subsequent Medicare Wellness Visit.    Follow-up for cholesterol with history of CAD s/p stent.  Currently, has been feeling well without any myalgias, muscle aches, weakness, numbness, chest pain, short of breath or other issues.  Currently, is adherent with medication regimen of atorvastatin 40 mg and denies medication side effects.  Last labs done 22.     Follow-up for hypertension and CAD.  Currently, has been feeling well and asymptomatic without any headaches, vision changes, cough, chest pain, shortness of breath, swelling, focal neurologic deficit, memory loss or syncope.  Has been taking the medications regularly and adherent with the regimen lisinopril 10 mg and aspirin 81 mg.  Denies medication side effects and no significant interval events.      Recent Hospitalizations:  Recently treated at the following:  [facility]. 22 23 hour observation for chest pain with negative stress test.    Current Medical Providers:  Patient Care Team:  Jak Link MD as PCP - General (Internal Medicine)  Giovanny Leyva MD as Consulting Physician (Cardiology)    Health Habits and Functional and Cognitive Screening and Depression Screening:  Functional & Cognitive Status 2022   Do you have difficulty preparing food and eating? No   Do you have difficulty bathing yourself, getting dressed or grooming yourself? No   Do you have difficulty using the toilet? No   Do you have difficulty moving around from place to place? No   Do you have trouble with steps or getting out of a bed or a chair? No   Current Diet Well Balanced Diet   Dental Exam Up to date   Eye Exam Up to date   Exercise (times per week) 7 times per week   Do you need help using the phone?  No    Are you deaf or do you have serious difficulty hearing?  No   Do you need help with transportation? No   Do you need help shopping? No   Do you need help preparing meals?  No   Do you need help with housework?  No   Do you need help with laundry? No   Do you need help taking your medications? No   Do you need help managing money? No   Do you ever drive or ride in a car without wearing a seat belt? No   Have you felt unusual stress, anger or loneliness in the last month? No   Who do you live with? Alone   If you need help, do you have trouble finding someone available to you? No   Have you been bothered in the last four weeks by sexual problems? No     Compared to one year ago, the patient feels his physical health is the same and his mental health is the same.    Depression Screen:  PHQ-2/PHQ-9 Depression Screening 9/1/2022   Little Interest or Pleasure in Doing Things 0-->not at all   Feeling Down, Depressed or Hopeless 0-->not at all   PHQ-9: Brief Depression Severity Measure Score 0     Falls Risk Assessment:  ABBY Fall Risk Clinician Key Questions   Have you fallen in the past year?: No  Do you feel unsteady with walking?: No  Are you worried about falling?: No    Past Medical/Family/Social History:  The following portions of the patient's history were reviewed and updated as appropriate: allergies, current medications, past family history, past medical history, past social history, past surgical history and problem list.    Allergies   Allergen Reactions   • Penicillins Hives   • Sertraline Anxiety     ?halllucinations       Current Outpatient Medications:   •  aspirin 81 MG chewable tablet, Chew 1 tablet Daily., Disp: 30 tablet, Rfl: 11  •  atorvastatin (LIPITOR) 40 MG tablet, TAKE ONE TABLET BY MOUTH ONCE NIGHTLY, Disp: 90 tablet, Rfl: 2  •  HYDROcodone-acetaminophen (NORCO) 5-325 MG per tablet, , Disp: , Rfl:   •  lisinopril (PRINIVIL,ZESTRIL) 10 MG tablet, TAKE ONE TABLET BY MOUTH DAILY, Disp: 90  tablet, Rfl: 3  •  nitroglycerin (NITROSTAT) 0.4 MG SL tablet, 1 under the tongue as needed for angina, may repeat q5mins for up three doses, Disp: 45 tablet, Rfl: 11  •  tamsulosin (FLOMAX) 0.4 MG capsule 24 hr capsule, TAKE ONE CAPSULE BY MOUTH DAILY, Disp: 90 capsule, Rfl: 1    Aspirin use counseling: Taking ASA appropriately as indicated    Current medication list contains no high risk medications.  No harmful drug interactions have been identified.     Family History   Problem Relation Age of Onset   • Heart attack Mother 60   • Heart disease Mother    • Hypertension Mother    • Diabetes Mother    • Heart attack Father 43   • Heart disease Father    • No Known Problems Daughter    • No Known Problems Daughter      Social History     Tobacco Use   • Smoking status: Former Smoker     Packs/day: 0.00     Years: 0.00     Pack years: 0.00     Types: Electronic Cigarette     Start date: 1972     Quit date: 2018     Years since quittin.6   • Smokeless tobacco: Never Used   • Tobacco comment: Quit smoking cigarettes 2018, now just uses ECig   Substance Use Topics   • Alcohol use: Yes     Comment: Less than 1 drink a week     Past Surgical History:   Procedure Laterality Date   • CARDIAC CATHETERIZATION     • CARDIAC CATHETERIZATION N/A 2018    Procedure: Left Heart Cath;  Surgeon: Giovanny Leyva MD;  Location: [facility] INVASIVE LOCATION;  Service: Cardiology   • CARDIAC CATHETERIZATION N/A 2018    Procedure: Stent CARRIE coronary;  Surgeon: Giovanny Leyva MD;  Location: [facility] CATH INVASIVE LOCATION;  Service: Cardiology   • CARDIAC CATHETERIZATION N/A 2018    Procedure: Coronary angiography;  Surgeon: Giovanny Leyva MD;  Location: [facility] CATH INVASIVE LOCATION;  Service: Cardiology   • CARDIAC CATHETERIZATION N/A 2018    Procedure: Left ventriculography;  Surgeon: Giovanny Leyva MD;  Location: [facility] CATH INVASIVE LOCATION;  Service: Cardiology   •  CARDIAC CATHETERIZATION  8/20/2018    Procedure: Percutaneous Mechanical Thrombectomy;  Surgeon: Giovanny Leyva MD;  Location:  [facility] CATH INVASIVE LOCATION;  Service: Cardiology   • RI RT/LT HEART CATHETERS N/A 8/20/2018    Procedure: Percutaneous Coronary Intervention;  Surgeon: Giovanny Leyva MD;  Location:  [facility] CATH INVASIVE LOCATION;  Service: Cardiology   • SKIN BIOPSY     • SKIN CANCER EXCISION  2001    Basal cell carcinoma x 2, malignant melanoma x 1   • WRIST SURGERY Left 2007     Patient Active Problem List   Diagnosis   • Acute MI, inferior wall (HCC)   • BPH (benign prostatic hyperplasia)   • DDD (degenerative disc disease), cervical   • Disequilibrium   • Dyslipidemia   • Hematochezia   • Inguinal hernia   • TIA (transient ischemic attack)   • Tobacco abuse   • Vertigo   • Tricuspid regurgitation   • Status post insertion of drug-eluting stent into right coronary artery for coronary artery disease   • Heart palpitations   • Dizziness   • Coronary artery disease   • Hyperlipidemia   • Essential hypertension   • Chronic fatigue   • Chest discomfort     Review of Systems   Constitutional: Negative for activity change, appetite change, fatigue, fever, unexpected weight gain and unexpected weight loss.   HENT: Negative for nosebleeds, rhinorrhea, trouble swallowing and voice change.    Eyes: Negative for visual disturbance.   Respiratory: Negative for cough, chest tightness, shortness of breath and wheezing.    Cardiovascular: Negative for chest pain, palpitations and leg swelling.   Gastrointestinal: Negative for abdominal pain, blood in stool, constipation, diarrhea, nausea, vomiting, GERD and indigestion.   Genitourinary: Negative for dysuria, frequency and hematuria.   Musculoskeletal: Negative for arthralgias, back pain and myalgias.   Skin: Negative for rash and wound.   Neurological: Negative for dizziness, tremors, weakness, light-headedness, numbness, headache and memory problem.  "  Hematological: Negative for adenopathy. Does not bruise/bleed easily.   Psychiatric/Behavioral: Negative for sleep disturbance and depressed mood. The patient is not nervous/anxious.      Objective     Vitals:    09/01/22 1537   BP: 122/82   BP Location: Left arm   Patient Position: Sitting   Cuff Size: Adult   Pulse: 66   Temp: 98.9 °F (37.2 °C)   TempSrc: Temporal   SpO2: 97%   Weight: 87.1 kg (192 lb)   Height: 182.9 cm (72\")     BMI is >= 25 and <30. (Overweight) The following options were offered after discussion;: weight loss educational material (shared in after visit summary), exercise counseling/recommendations and nutrition counseling/recommendations    The patient has no evidence of cognitve impairment.     Physical Exam  Vitals and nursing note reviewed.   Constitutional:       General: He is not in acute distress.     Appearance: He is well-developed. He is not diaphoretic.   HENT:      Head: Normocephalic and atraumatic.      Right Ear: External ear normal.      Left Ear: External ear normal.      Nose: Nose normal.   Eyes:      Conjunctiva/sclera: Conjunctivae normal.      Pupils: Pupils are equal, round, and reactive to light.   Neck:      Thyroid: No thyromegaly.      Trachea: No tracheal deviation.   Cardiovascular:      Rate and Rhythm: Normal rate and regular rhythm.      Heart sounds: Normal heart sounds. No murmur heard.    No friction rub. No gallop.   Pulmonary:      Effort: Pulmonary effort is normal. No respiratory distress.      Breath sounds: Normal breath sounds.   Abdominal:      General: Bowel sounds are normal.      Palpations: Abdomen is soft. There is no mass.      Tenderness: There is no abdominal tenderness. There is no guarding.   Musculoskeletal:         General: Normal range of motion.      Cervical back: Normal range of motion and neck supple.   Lymphadenopathy:      Cervical: No cervical adenopathy.   Skin:     General: Skin is warm and dry.      Capillary Refill: Capillary " refill takes less than 2 seconds.      Findings: No rash.   Neurological:      Mental Status: He is alert and oriented to person, place, and time.      Motor: No abnormal muscle tone.      Deep Tendon Reflexes: Reflexes normal.   Psychiatric:         Behavior: Behavior normal.         Thought Content: Thought content normal.         Judgment: Judgment normal.     Recent Lab Results:     Lab Results   Component Value Date    CHOL 95 08/28/2022    TRIG 72 08/28/2022    HDL 36 (L) 08/28/2022    VLDL 15 08/28/2022    LDLHDL 1.24 08/28/2022     Assessment & Plan   Age-appropriate Screening Schedule:  Refer to the list below for future screening recommendations based on patient's age, sex and/or medical conditions.      Health Maintenance   Topic Date Due   • TDAP/TD VACCINES (1 - Tdap) Never done   • ZOSTER VACCINE (1 of 2) Never done   • INFLUENZA VACCINE  10/01/2022   • LIPID PANEL  08/28/2023     Medicare Risks and Personalized Health Plan:  Advance Directive Discussion  Fall Risk  Glaucoma Risk  Immunizations Discussed/Encouraged (specific immunizations; Tdap, Prevnar 20 (Pneumococcal 20-valent conjugate), Shingrix and COVID19 )  Obesity/Overweight     CMS-Preventive Services Quick Reference  Medicare Preventive Services Addressed:  Annual Wellness Visit (AWV)  Glaucoma screening (for individuals with diabetes mellitus, family history of glaucoma, -Americans (> or =) age 50, -Americans (> or =) age 65)    Advance Care Planning:  ACP discussion was held with the patient during this visit. Patient does not have an advance directive, information provided.    There are no diagnoses linked to this encounter.  Annual wellness visit reviewed with patient.  All past history, medications, social history, and problem list were reviewed.  Discussed advanced directives and living will.  Patient has living will: Living will: no and information packet given to patient to complete at home and bring copy to office.  Patient has the information at home given at last visit.  Discussed fall risk and precautions encourage removing throw rugs and using grab bars within the home and bathroom.  Will check the labs as ordered above to evaluate the blood sugars, kidney, liver, cholesterol for screening.  Discussed flu shot recommended to get the high-dose influenza vaccine annually in the fall.  The patient qualifies to receive the vaccine, but they have not yet received it.  Tdap, covid series, prevnar-20, and Shingrix vaccination series recommended and patient will consider.  Encourage follow-up with the eye doctor on annual basis for glaucoma evaluation.  Discussed weight and encouraged exercise as tolerated while following a healthy diet.  Colon cancer screening discussed and current status: colonoscopy ordered.  Discussed prostate screening for which he last had PSA 1/17/22.  Follow up with current specialists as needed.    Patient looks great.  No changes at this     An After Visit Summary and PPPS with all of these plans were given to the patient.      Follow Up:  No follow-ups on file.           · COVID-19 Precautions - Patient was compliant in wearing a mask. When I saw the patient, I used appropriate personal protective equipment (PPE) including mask and eye shield (standard procedure).  Additionally, I used gown and gloves if indicated.  Hand hygiene was completed before and after seeing the patient.  · Dictated utilizing Dragon Dictation

## 2022-09-01 NOTE — PATIENT INSTRUCTIONS
Medicare Wellness  Personal Prevention Plan of Service     Date of Office Visit:    Encounter Provider:  Jak Link MD  Place of Service:  [facility]  Patient Name: Flavio Frost  :  1955    As part of the Medicare Wellness portion of your visit today, we are providing you with this personalized preventive plan of services (PPPS). This plan is based upon recommendations of the [place] Preventive Services Task Force (USPSTF) and the Advisory Committee on Immunization Practices (ACIP).    This lists the preventive care services that should be considered, and provides dates of when you are due. Items listed as completed are up-to-date and do not require any further intervention.    Health Maintenance   Topic Date Due    COLORECTAL CANCER SCREENING  Never done    Pneumococcal Vaccine 65+ (1 - PCV) Never done    TDAP/TD VACCINES (1 - Tdap) Never done    ZOSTER VACCINE (1 of 2) Never done    HEPATITIS C SCREENING  Never done    AAA SCREEN (ONE-TIME)  Never done    COVID-19 Vaccine (1) 2022 (Originally 1956)    INFLUENZA VACCINE  10/01/2022    LIPID PANEL  2023    ANNUAL WELLNESS VISIT  2023       Orders Placed This Encounter   Procedures    US aaa screen limited     Standing Status:   Future     Standing Expiration Date:   2023     Order Specific Question:   Is the patient a male between 65-75 years old and have smoked at least 100 cigarettes in their lifetime OR a male or female Medicare patient who has a family history of abdominal aoritc aneurysm?     Answer:   Yes     Order Specific Question:   Reason for Exam:     Answer:   65 y/o with history of smoking for AAA screen    Ambulatory Referral For Screening Colonoscopy     Referral Priority:   Routine     Referral Type:   Diagnostic Medical     Referral Reason:   Specialty Services Required     Number of Visits Requested:   1       Return in about 6 months (around 3/1/2023).

## 2022-09-09 ENCOUNTER — TELEPHONE (OUTPATIENT)
Dept: CARDIOLOGY | Facility: CLINIC | Age: 67
End: 2022-09-09

## 2022-09-09 NOTE — TELEPHONE ENCOUNTER
Pt called about getting an echo that he was told to have when he was discharged from the observation unit.    See below.    It looks like the nuclear stress test was done, but not the echo.  Also there is not an order in Epic to have this echo.    Please advise.    Thanks,    Mellissa    On 8/28/22:    Assessment and Plan:   66-year-old male admitted with retrosternal chest pain as well as a left arm numbness EKG enzymes are normal we will proceed with a stress test and echocardiogram chest pains appears to be atypical        Darren Fofana MD  08/28/22  07:32 EDT      Hospital Outcome:   66-year-old male admitted to the observation unit for further evaluation of chest pain.  Serial troponins remain negative and EKG noted nonischemic.  Chest x-ray showed a mildly enlarged cardiac silhouette with no other acute findings.  Patient was seen and evaluated by cardiology who recommended stress test that was normal with no evidence of ischemia.  Patient will be set up for an outpatient echo and follow-up with cardiology in their office.  Patient also had a CT of the cervical spine without that revealed no acute fracture with degenerative changes noted.  Patient states pain has improved and will have him follow-up with his PCP for any further arm or shoulder pain.  Discussed all of the findings and plan for discharge with the patient who endorses understanding is in agreement.

## 2022-09-10 DIAGNOSIS — I25.118 CORONARY ARTERY DISEASE OF NATIVE ARTERY OF NATIVE HEART WITH STABLE ANGINA PECTORIS: Primary | ICD-10-CM

## 2022-09-19 ENCOUNTER — PRE-PROCEDURE SCREENING (OUTPATIENT)
Dept: GASTROENTEROLOGY | Facility: CLINIC | Age: 67
End: 2022-09-19

## 2022-09-23 NOTE — ED NOTES
Pt here for c/o left chestpain that started 1 hr pta - pt given 324 mg aspirin per EMS prior to arrival.       Steph Juan RN  08/20/18 0606    
No

## 2022-09-30 ENCOUNTER — HOSPITAL ENCOUNTER (OUTPATIENT)
Dept: CARDIOLOGY | Facility: HOSPITAL | Age: 67
Discharge: HOME | End: 2022-09-30
Admitting: INTERNAL MEDICINE

## 2022-09-30 VITALS
BODY MASS INDEX: 26.01 KG/M2 | SYSTOLIC BLOOD PRESSURE: 130 MMHG | HEART RATE: 54 BPM | DIASTOLIC BLOOD PRESSURE: 70 MMHG | WEIGHT: 192.02 LBS | HEIGHT: 72 IN

## 2022-09-30 DIAGNOSIS — I25.118 CORONARY ARTERY DISEASE OF NATIVE ARTERY OF NATIVE HEART WITH STABLE ANGINA PECTORIS: ICD-10-CM

## 2022-09-30 LAB
AORTIC ARCH: 2.8 CM
ASCENDING AORTA: 3.5 CM
BH CV ECHO MEAS - ACS: 2.04 CM
BH CV ECHO MEAS - AO MAX PG: 11.4 MMHG
BH CV ECHO MEAS - AO MEAN PG: 6.3 MMHG
BH CV ECHO MEAS - AO ROOT DIAM: 3 CM
BH CV ECHO MEAS - AO V2 MAX: 168.9 CM/SEC
BH CV ECHO MEAS - AO V2 VTI: 38.5 CM
BH CV ECHO MEAS - EDV(CUBED): 91.7 ML
BH CV ECHO MEAS - EDV(MOD-SP2): 110 ML
BH CV ECHO MEAS - EDV(MOD-SP4): 81 ML
BH CV ECHO MEAS - EF(MOD-BP): 63.5 %
BH CV ECHO MEAS - EF(MOD-SP2): 64.5 %
BH CV ECHO MEAS - EF(MOD-SP4): 55.6 %
BH CV ECHO MEAS - ESV(CUBED): 11.5 ML
BH CV ECHO MEAS - ESV(MOD-SP2): 39 ML
BH CV ECHO MEAS - ESV(MOD-SP4): 36 ML
BH CV ECHO MEAS - FS: 49.9 %
BH CV ECHO MEAS - IVS/LVPW: 0.92 CM
BH CV ECHO MEAS - IVSD: 0.96 CM
BH CV ECHO MEAS - LAT PEAK E' VEL: 11.3 CM/SEC
BH CV ECHO MEAS - LV DIASTOLIC VOL/BSA (35-75): 38.7 CM2
BH CV ECHO MEAS - LV MASS(C)D: 153.5 GRAMS
BH CV ECHO MEAS - LV MAX PG: 5.7 MMHG
BH CV ECHO MEAS - LV MEAN PG: 3 MMHG
BH CV ECHO MEAS - LV SYSTOLIC VOL/BSA (12-30): 17.2 CM2
BH CV ECHO MEAS - LV V1 MAX: 119.7 CM/SEC
BH CV ECHO MEAS - LV V1 VTI: 26.9 CM
BH CV ECHO MEAS - LVIDD: 4.5 CM
BH CV ECHO MEAS - LVIDS: 2.26 CM
BH CV ECHO MEAS - LVPWD: 1.04 CM
BH CV ECHO MEAS - MED PEAK E' VEL: 8.8 CM/SEC
BH CV ECHO MEAS - MV A DUR: 0.14 SEC
BH CV ECHO MEAS - MV A MAX VEL: 47.1 CM/SEC
BH CV ECHO MEAS - MV DEC SLOPE: 235.2 CM/SEC2
BH CV ECHO MEAS - MV DEC TIME: 0.36 MSEC
BH CV ECHO MEAS - MV E MAX VEL: 70.7 CM/SEC
BH CV ECHO MEAS - MV E/A: 1.5
BH CV ECHO MEAS - MV MAX PG: 3 MMHG
BH CV ECHO MEAS - MV MEAN PG: 1.07 MMHG
BH CV ECHO MEAS - MV P1/2T: 102.3 MSEC
BH CV ECHO MEAS - MV V2 VTI: 33.1 CM
BH CV ECHO MEAS - MVA(P1/2T): 2.15 CM2
BH CV ECHO MEAS - PA ACC TIME: 0.14 SEC
BH CV ECHO MEAS - PA PR(ACCEL): 17.2 MMHG
BH CV ECHO MEAS - PULM A REVS DUR: 0.2 SEC
BH CV ECHO MEAS - PULM A REVS VEL: 30.3 CM/SEC
BH CV ECHO MEAS - PULM DIAS VEL: 35.3 CM/SEC
BH CV ECHO MEAS - PULM S/D: 1.48
BH CV ECHO MEAS - PULM SYS VEL: 52.3 CM/SEC
BH CV ECHO MEAS - RV MAX PG: 2.05 MMHG
BH CV ECHO MEAS - RV V1 MAX: 71.6 CM/SEC
BH CV ECHO MEAS - RV V1 VTI: 15.8 CM
BH CV ECHO MEAS - RVOT DIAM: 2.04 CM
BH CV ECHO MEAS - SI(MOD-SP2): 33.9 ML/M2
BH CV ECHO MEAS - SI(MOD-SP4): 21.5 ML/M2
BH CV ECHO MEAS - SUP REN AO DIAM: 2.1 CM
BH CV ECHO MEAS - SV(MOD-SP2): 71 ML
BH CV ECHO MEAS - SV(MOD-SP4): 45 ML
BH CV ECHO MEAS - SV(RVOT): 51.6 ML
BH CV ECHO MEAS - TAPSE (>1.6): 2.8 CM
BH CV ECHO MEASUREMENTS AVERAGE E/E' RATIO: 7.03
BH CV XLRA - RV BASE: 3.6 CM
BH CV XLRA - RV LENGTH: 8.8 CM
BH CV XLRA - RV MID: 3.2 CM
BH CV XLRA - TDI S': 11.4 CM/SEC
LEFT ATRIUM VOLUME INDEX: 18.6 ML/M2
LV EF 2D ECHO EST: 64 %
MAXIMAL PREDICTED HEART RATE: 155 BPM
SINUS: 3.7 CM
STJ: 3.2 CM
STRESS TARGET HR: 132 BPM

## 2022-09-30 PROCEDURE — 93306 TTE W/DOPPLER COMPLETE: CPT | Performed by: INTERNAL MEDICINE

## 2022-09-30 PROCEDURE — 93306 TTE W/DOPPLER COMPLETE: CPT

## 2022-10-03 RX ORDER — TAMSULOSIN HYDROCHLORIDE 0.4 MG/1
CAPSULE ORAL
Qty: 90 CAPSULE | Refills: 1 | OUTPATIENT
Start: 2022-10-03

## 2022-10-03 NOTE — TELEPHONE ENCOUNTER
Rx Refill Note  Requested Prescriptions     Pending Prescriptions Disp Refills   • tamsulosin (FLOMAX) 0.4 MG capsule 24 hr capsule [Pharmacy Med Name: TAMSULOSIN HCL 0.4 MG CAPSULE] 90 capsule 1     Sig: TAKE ONE CAPSULE BY MOUTH DAILY      Last office visit with prescribing clinician: 9/1/2022      Next office visit with prescribing clinician: Visit date not found            Derek Livingston, FATUMA/LMR  10/03/22, 16:25 EDT

## 2022-10-05 ENCOUNTER — APPOINTMENT (OUTPATIENT)
Dept: ULTRASOUND IMAGING | Facility: HOSPITAL | Age: 67
End: 2022-10-05

## 2022-10-13 ENCOUNTER — OFFICE VISIT (OUTPATIENT)
Dept: CARDIOLOGY | Facility: CLINIC | Age: 67
End: 2022-10-13

## 2022-10-13 VITALS
WEIGHT: 193 LBS | OXYGEN SATURATION: 98 % | HEART RATE: 54 BPM | SYSTOLIC BLOOD PRESSURE: 126 MMHG | DIASTOLIC BLOOD PRESSURE: 80 MMHG | BODY MASS INDEX: 26.14 KG/M2 | HEIGHT: 72 IN | RESPIRATION RATE: 16 BRPM

## 2022-10-13 DIAGNOSIS — E78.5 HYPERLIPIDEMIA, UNSPECIFIED HYPERLIPIDEMIA TYPE: ICD-10-CM

## 2022-10-13 DIAGNOSIS — I25.118 CORONARY ARTERY DISEASE OF NATIVE ARTERY OF NATIVE HEART WITH STABLE ANGINA PECTORIS: Primary | ICD-10-CM

## 2022-10-13 DIAGNOSIS — I10 ESSENTIAL HYPERTENSION: ICD-10-CM

## 2022-10-13 DIAGNOSIS — Z95.5 STATUS POST INSERTION OF DRUG-ELUTING STENT INTO RIGHT CORONARY ARTERY FOR CORONARY ARTERY DISEASE: ICD-10-CM

## 2022-10-13 PROCEDURE — 99214 OFFICE O/P EST MOD 30 MIN: CPT | Performed by: NURSE PRACTITIONER

## 2022-10-13 PROCEDURE — 93000 ELECTROCARDIOGRAM COMPLETE: CPT | Performed by: NURSE PRACTITIONER

## 2022-10-13 RX ORDER — LISINOPRIL 10 MG/1
10 TABLET ORAL DAILY
Qty: 90 TABLET | Refills: 3 | Status: SHIPPED | OUTPATIENT
Start: 2022-10-13

## 2022-10-13 RX ORDER — ATORVASTATIN CALCIUM 40 MG/1
40 TABLET, FILM COATED ORAL NIGHTLY
Qty: 90 TABLET | Refills: 3 | Status: SHIPPED | OUTPATIENT
Start: 2022-10-13 | End: 2023-01-04

## 2022-10-13 NOTE — PROGRESS NOTES
Date of Office Visit: 10/13/2022  Encounter Provider: VARINDER Carlisle  Place of Service: [facility] CARDIOLOGY  Patient Name: Flavio Frost  :1955    Chief Complaint   Patient presents with   • Coronary Artery Disease     Overdue for 1 year follow up    :     HPI: Flavio Frost is a 66 y.o. male who is a patient of Dr. Leyva.  He is new to me today and presents for an overdue hospital follow-up.  He has a history of dyslipidemia, tobacco abuse and hypertension.  He has coronary artery disease with an inferior MI in 2018 for which he had thrombectomy and CARRIE placement to RCA.  During that intervention, EF was 30%, follow-up echo showed EF 70%.      He presented to [facility] emergency department on 2022 with left arm numbness and subsequent chest pressure.  This was associated with lightheadedness and some mild shortness of breath.  The episode lasted approximately 5 to 7 minutes.  EKG did not show ischemia and serial troponins remained negative.  Patient underwent exercise Cardiolite stress test on 2022 which showed normal myocardial perfusion study with no evidence of ischemia, EF 60%.  Patient also had a normal exercise Cardiolite stress test in 2020, as well.     Prior cardiac testing:  Echocardiogram on 2022 showed normal left ventricular function and normal LV cavity size and wall thickness.  There is mild calcification of aortic valve with no aortic regurgitation or stenosis.  Mitral valve structurally normal with no regurgitation or stenosis.  Normal RV size and systolic function and normal left and right atrium.      Patient had previous 14-day Holter monitor in 2021 that was relatively benign.  Showed episodes of junctional rhythm.  Palpitations were associated with isolated PACs, PVCs and junctional rhythm.  There were some occasional episodes of dizziness reported.  No evidence of atrial arrhythmias,  supraventricular tachycardia or VT.  Normal SA node conduction and no AV block.  He also had a relatively benign 48-hour Holter monitor in 2018.    Patient presents today with no complaints.  His blood pressure is well controlled.  EKG is stable.  Lipid panel in target range.  He stopped smoking when he had his MI in 2018.  He stopped vaping about 3 to 4 months ago.  Patient has been eating much better since his heart attack.  He does not eat fried foods and instead uses the air Fryer.  He does not bring his food.  He is also stopped eating fast food.  His only complaint is that he has a little bit of fatigue.  Has been ongoing and nothing new.  He attributes this to not taking the initiative to increase activity.  This is something that he plans to do.    Previous testing and notes have been reviewed by me.   Past Medical History:   Diagnosis Date   • BPH (benign prostatic hyperplasia)    • Bradycardia    • Cancer (HCC)     skin cancer   • Coronary artery disease    • Enlarged prostate    • Hyperlipidemia    • Hypertension    • Hypotension    • Myocardial infarction (HCC)        Past Surgical History:   Procedure Laterality Date   • CARDIAC CATHETERIZATION     • CARDIAC CATHETERIZATION N/A 8/20/2018    Procedure: Left Heart Cath;  Surgeon: Giovanny Leyva MD;  Location: [facility] INVASIVE LOCATION;  Service: Cardiology   • CARDIAC CATHETERIZATION N/A 8/20/2018    Procedure: Stent CARRIE coronary;  Surgeon: Giovanny Leyva MD;  Location: [facility] INVASIVE LOCATION;  Service: Cardiology   • CARDIAC CATHETERIZATION N/A 8/20/2018    Procedure: Coronary angiography;  Surgeon: Giovanny Leyva MD;  Location: [facility] INVASIVE LOCATION;  Service: Cardiology   • CARDIAC CATHETERIZATION N/A 8/20/2018    Procedure: Left ventriculography;  Surgeon: Giovanny Leyva MD;  Location: [facility] INVASIVE LOCATION;  Service: Cardiology   • CARDIAC CATHETERIZATION  8/20/2018    Procedure: Percutaneous  Mechanical Thrombectomy;  Surgeon: Giovanny Leyva MD;  Location:  [facility] INVAS[facility] LOCATION;  Service: Cardiology   • WV RT/LT HEART CATHETERS N/A 2018    Procedure: Percutaneous Coronary Intervention;  Surgeon: Giovanny Leyva MD;  Location:  [facility] INVASIVE LOCATION;  Service: Cardiology   • SKIN BIOPSY     • SKIN CANCER EXCISION      Basal cell carcinoma x 2, malignant melanoma x 1   • WRIST SURGERY Left        Social History     Socioeconomic History   • Marital status: Single   Tobacco Use   • Smoking status: Former     Packs/day: 0.00     Years: 0.00     Pack years: 0.00     Types: Electronic Cigarette, Cigarettes     Start date: 1972     Quit date: 2018     Years since quittin.7   • Smokeless tobacco: Never   • Tobacco comments:     Quit smoking cigarettes 2018, now just uses ECig   Vaping Use   • Vaping Use: Every day   • Start date: 2018   • Substances: Nicotine   Substance and Sexual Activity   • Alcohol use: Yes     Comment: Less than 1 drink a week   • Drug use: Never   • Sexual activity: Not Currently       Family History   Problem Relation Age of Onset   • Heart attack Mother 60   • Heart disease Mother    • Hypertension Mother    • Diabetes Mother    • Heart attack Father 43   • Heart disease Father    • No Known Problems Daughter    • No Known Problems Daughter        Review of Systems   Constitutional: Negative.   HENT: Negative.    Eyes: Negative.    Cardiovascular: Negative.    Respiratory: Negative.    Endocrine: Negative.    Hematologic/Lymphatic: Negative.    Skin: Negative.    Musculoskeletal: Negative.    Gastrointestinal: Negative.    Neurological: Negative.    Allergic/Immunologic: Negative.        Allergies   Allergen Reactions   • Penicillins Hives   • Sertraline Anxiety     ?halllucinations         Current Outpatient Medications:   •  aspirin 81 MG chewable tablet, Chew 1 tablet Daily., Disp: 30 tablet, Rfl: 11  •  atorvastatin  "(LIPITOR) 40 MG tablet, Take 1 tablet by mouth Every Night., Disp: 90 tablet, Rfl: 3  •  lisinopril (PRINIVIL,ZESTRIL) 10 MG tablet, Take 1 tablet by mouth Daily., Disp: 90 tablet, Rfl: 3  •  nitroglycerin (NITROSTAT) 0.4 MG SL tablet, 1 under the tongue as needed for angina, may repeat q5mins for up three doses (Patient taking differently: 1 under the tongue as needed for angina, may repeat q5mins for up three doses  Has them in his pocket, however he dose not generally use them), Disp: 45 tablet, Rfl: 11  •  tamsulosin (FLOMAX) 0.4 MG capsule 24 hr capsule, TAKE ONE CAPSULE BY MOUTH DAILY, Disp: 90 capsule, Rfl: 1      Objective:     Vitals:    10/13/22 1406   BP: 126/80   BP Location: Right arm   Patient Position: Sitting   Cuff Size: Adult   Pulse: 54   Resp: 16   SpO2: 98%   Weight: 87.5 kg (193 lb)   Height: 182.9 cm (72\")     Body mass index is 26.18 kg/m².    PHYSICAL EXAM:    Constitutional:       Appearance: Healthy appearance. Not in distress.   Neck:      Vascular: No JVR. JVD normal.   Pulmonary:      Effort: Pulmonary effort is normal.      Breath sounds: Normal breath sounds. No wheezing. No rhonchi. No rales.   Chest:      Chest wall: Not tender to palpatation.   Cardiovascular:      PMI at left midclavicular line. Normal rate. Regular rhythm. Normal S1. Normal S2.      Murmurs: There is no murmur.      No gallop. No click. No rub.   Pulses:     Intact distal pulses.   Edema:     Peripheral edema absent.   Abdominal:      General: Bowel sounds are normal.      Palpations: Abdomen is soft.      Tenderness: There is no abdominal tenderness.   Musculoskeletal: Normal range of motion.         General: No tenderness. Skin:     General: Skin is warm and dry.   Neurological:      General: No focal deficit present.      Mental Status: Alert and oriented to person, place and time.           ECG 12 Lead    Date/Time: 10/13/2022 2:49 PM  Performed by: Steph Rios APRN  Authorized by: Steph Rios, " APRN   Comparison: compared with previous ECG from 8/27/2022  Rhythm: sinus rhythm  Ectopy: unifocal PVCs  BPM: 54  Conduction: conduction normal  ST Segments: ST segments normal  T Waves: T waves normal    Clinical impression: normal ECG              Assessment:       Diagnosis Plan   1. Coronary artery disease of native artery of native heart with stable angina pectoris (HCC)        2. Status post insertion of drug-eluting stent into right coronary artery for coronary artery disease        3. Hyperlipidemia, unspecified hyperlipidemia type        4. Essential hypertension          No orders of the defined types were placed in this encounter.         Plan:       1.  Coronary artery disease: History of inferior MI in 2018 status post thrombectomy and CARRIE to RCA.  Remains on aspirin and statin.  No beta-blocker as patient with history of junctional rhythm  2.  Hypertension: Well-controlled  3.  Hyperlipidemia: In target range on statin therapy.  Patient is very particular about his diet  4.  H/o tobacco abuse: Stopped smoking after MI in 2018.  Stopped vaping about 4 months ago.  5.  Junctional rhythm: Intermittent.  Not on beta-blockers    Mr. Frost will follow up with Dr. Dahl in 1 year.  He will call sooner for any questions or concerns.           Your medication list          Accurate as of October 13, 2022  2:38 PM. If you have any questions, ask your nurse or doctor.            CHANGE how you take these medications      Instructions Last Dose Given Next Dose Due   nitroglycerin 0.4 MG SL tablet  Commonly known as: NITROSTAT  What changed: additional instructions      1 under the tongue as needed for angina, may repeat q5mins for up three doses          CONTINUE taking these medications      Instructions Last Dose Given Next Dose Due   aspirin 81 MG chewable tablet      Chew 1 tablet Daily.       atorvastatin 40 MG tablet  Commonly known as: LIPITOR      Take 1 tablet by mouth Every Night.       lisinopril  10 MG tablet  Commonly known as: PRINIVIL,ZESTRIL      Take 1 tablet by mouth Daily.       tamsulosin 0.4 MG capsule 24 hr capsule  Commonly known as: FLOMAX      TAKE ONE CAPSULE BY MOUTH DAILY             Where to Get Your Medications      These medications were sent to [facility] [id number] - [place], [place] - [address] [address] AT [facility] & [place] - [phone]  - [phone]   [address] [address], [facility] [address]    Phone: [phone]   · atorvastatin 40 MG tablet  · lisinopril 10 MG tablet           As always, it has been a pleasure to participate in your patient's care.      Sincerely,       VARINDER Segal

## 2022-12-13 ENCOUNTER — TELEPHONE (OUTPATIENT)
Dept: CARDIOLOGY | Facility: CLINIC | Age: 67
End: 2022-12-13

## 2022-12-13 NOTE — TELEPHONE ENCOUNTER
"Returned call to Flavio Frost     Patient reports intermittent chest pain started 3 days ago.  Chest pain occurs in the center of his chest and is described as sharp, \"like a knife stabbing through [his] chest\".  Patient denies any other symptoms when chest pain occurs.  Patient reports episodes occur at rest, when sitting or laying down in bed, and happen anywhere from 3-7 times per day.  Patient stated he had to sleep sitting up last night because he felt the pain every time he laid down to sleep.  Patient took an extra baby aspirin at 2 am because he thought it might help.  Patient does not endorse orthopnea or any shortness of breath.  Patient denies any swelling, chest pressure, nausea, dizziness or arm pain.    Of note, patient recently got over what he thought was the flu.  The Monday before Thanksgiving, patient developed cough, body aches, fatigue, chills and fever.  These symptoms lasted 8-10 days.  Then patient reports, his illness moved to his chest.  Patient had a productive cough and would often cough to the point of gagging.  Patient stated his symptoms resolved about 1 week ago.      Back in August, patient developed sudden numbness in his left arm along with dizziness.  Patient was seen in ED and had work up done.  Patient stated he was told all of his testing looked normal, but is wondering if there are any indications that he could have another blockage?  Patient had an MI in 2018 and had one stent placed.    Patient expressed concern about his chest pain and is requesting sooner appointment if available.  Patient agreeable to see Nilda Dan on 12/14 at 2pm.    Please let me know if there is anything else you would like me to do for this patient.    Thank you,  Pam Felix RN  Triage Nurse [facility] [place]  "

## 2022-12-13 NOTE — TELEPHONE ENCOUNTER
Called Flavio Frost for additional information.  Patient requested a callback in 3-4 minutes.  Will return call to patient as requested.    Thank you,  Pam Felix RN  Triage Nurse [facility] [place]

## 2022-12-13 NOTE — TELEPHONE ENCOUNTER
Caller: MEGAN    Relationship: SELF    Best call back number: [phone]    What is the best time to reach you: ANY    What was the call regarding: PT DID NOT WANT TO GO TO THE ED- SET AN APPT WITH STEFANY LOZANO ON 12/28/22-      RIGHT ABOVE THE CENTER OF STOMACH WHERE RIBS COME TOGETHER, RIGHT ABOVE BELLY, PT STATES HE IS HAVING VERY SHARP PAINS FOR A COUPLE OF SECONDS, ALMOST CRIPPLING.   RADIATES THROUGH THE MIDDLE OF HIS CHEST.  PAIN HAS BEEN GOING ON FOR THREE DAYS.  PT REPORTS HAVING THREE TO FIVE EPISODES PER DAY, MOSTLY AT NIGHT AND EARLY MORNING.    PT HAS HISTORY OF HEART ATTACK AN    Do you require a callback: YES

## 2022-12-14 ENCOUNTER — OFFICE VISIT (OUTPATIENT)
Dept: CARDIOLOGY | Facility: CLINIC | Age: 67
End: 2022-12-14

## 2022-12-14 VITALS
HEIGHT: 72 IN | DIASTOLIC BLOOD PRESSURE: 80 MMHG | HEART RATE: 59 BPM | WEIGHT: 194 LBS | SYSTOLIC BLOOD PRESSURE: 130 MMHG | BODY MASS INDEX: 26.28 KG/M2

## 2022-12-14 DIAGNOSIS — I25.118 CORONARY ARTERY DISEASE OF NATIVE ARTERY OF NATIVE HEART WITH STABLE ANGINA PECTORIS: ICD-10-CM

## 2022-12-14 DIAGNOSIS — Z95.5 STATUS POST INSERTION OF DRUG-ELUTING STENT INTO RIGHT CORONARY ARTERY FOR CORONARY ARTERY DISEASE: ICD-10-CM

## 2022-12-14 DIAGNOSIS — R07.89 CHEST DISCOMFORT: ICD-10-CM

## 2022-12-14 DIAGNOSIS — E78.5 DYSLIPIDEMIA: Primary | ICD-10-CM

## 2022-12-14 DIAGNOSIS — I10 ESSENTIAL HYPERTENSION: ICD-10-CM

## 2022-12-14 DIAGNOSIS — I07.1 TRICUSPID VALVE INSUFFICIENCY, UNSPECIFIED ETIOLOGY: ICD-10-CM

## 2022-12-14 PROCEDURE — 93000 ELECTROCARDIOGRAM COMPLETE: CPT | Performed by: NURSE PRACTITIONER

## 2022-12-14 PROCEDURE — 99214 OFFICE O/P EST MOD 30 MIN: CPT | Performed by: NURSE PRACTITIONER

## 2022-12-14 RX ORDER — PANTOPRAZOLE SODIUM 40 MG/1
40 TABLET, DELAYED RELEASE ORAL DAILY
Qty: 90 TABLET | Refills: 3 | Status: SHIPPED | OUTPATIENT
Start: 2022-12-14

## 2023-01-03 ENCOUNTER — TELEPHONE (OUTPATIENT)
Dept: CARDIOLOGY | Facility: CLINIC | Age: 68
End: 2023-01-03

## 2023-01-03 DIAGNOSIS — E78.5 DYSLIPIDEMIA: Primary | ICD-10-CM

## 2023-01-03 NOTE — TELEPHONE ENCOUNTER
Caller: RONALDO    Relationship: SELF    Best call back number: [phone],     What is the best time to reach you: MORNINGS BEFORE 10:00 AM IF POSSIBLE    What was the call regarding:     PT WAS WONDERING IF HE COULD REDUCE HIS DOSE OF ATORVASATIN.  HE STATES HE FEELS LIKE HIS CHOLESTEROL IS UNDER CONTROL LAST TESTED 8/28/22    STATES HE FEELS DIZZY AND NAUSEOUS ON OCCASION AND HAS NO ENERGY.  HAD DIGESTIVE ISSUES ON THE MEDICATION AS WELL BUT WAS ABLE TO SOLVE THAT WITH [facility]ER.      STATES HE HAS HAD DISCUSSIONS WITH FRIENDS THAT STATED HIS ISSUES MAYBE DUE TO THE STATIN.    Do you require a callback: YES

## 2023-01-03 NOTE — TELEPHONE ENCOUNTER
We can try a lower dose of 20mg and see if his symptoms improve. Will need to recheck a lipid panel in 3 months as well. He can cut his current atorvastatin tablets in half.

## 2023-01-04 RX ORDER — ATORVASTATIN CALCIUM 40 MG/1
20 TABLET, FILM COATED ORAL NIGHTLY
Qty: 90 TABLET | Refills: 3
Start: 2023-01-04 | End: 2023-03-31 | Stop reason: SDUPTHER

## 2023-01-04 NOTE — TELEPHONE ENCOUNTER
Reviewed recommendations with Flavio Frost and the patient is agreeable to this plan, with repeat back of medication change.  Patient stated he will come in for lab draw the first week of April.    Would you like me to place the order for lipid panel?  Patient is also asking if his liver function will be checked with this lab draw?    Medication list updated.    Thank you,  Pam Felix RN  Triage Nurse [facility]

## 2023-03-09 ENCOUNTER — TELEPHONE (OUTPATIENT)
Dept: CARDIOLOGY | Facility: CLINIC | Age: 68
End: 2023-03-09

## 2023-03-09 NOTE — TELEPHONE ENCOUNTER
"Caller: Flavio Frost \"Dash Frost\"    Relationship: Self    Best call back number: [phone]    Who are you requesting to speak with (clinical staff, provider,  specific staff member): CLINICAL     What was the call regarding: PT REPORTS HE IS MEANT TO HAVE BLOOD WORK ON OR AFTER THE 8TH AND PT WANTED TO MAKE SURE WHEN HE NEEDS TO COME IN TO CHECK CHOLESTEROL AND LIVER FUNCTION -     Do you require a callback: YES PLEASE    "

## 2023-03-22 RX ORDER — TAMSULOSIN HYDROCHLORIDE 0.4 MG/1
1 CAPSULE ORAL DAILY
Qty: 90 CAPSULE | Refills: 1 | Status: SHIPPED | OUTPATIENT
Start: 2023-03-22

## 2023-03-22 NOTE — TELEPHONE ENCOUNTER
"  Caller: Flavio Frost \"Dash Frost\"    Relationship: Self    Best call back number: [phone]    Requested Prescriptions:   Requested Prescriptions     Pending Prescriptions Disp Refills   • tamsulosin (FLOMAX) 0.4 MG capsule 24 hr capsule 90 capsule 1     Sig: Take 1 capsule by mouth Daily.        Pharmacy where request should be sent: [id number] - [facility] [address] [address] AT [place] [address] & [place] [address] - [phone] [facility] [phone] FX     Last office visit with prescribing clinician: 9/1/2022   Last telemedicine visit with prescribing clinician: Visit date not found   Next office visit with prescribing clinician: Visit date not found     Additional details provided by patient: PATIENT HAS 11 CAPSULES REMAINING. PATIENT HAS CHANGED PHARMACIES TO [facility].     Does the patient have less than a 3 day supply:  [] Yes  [x] No    Would you like a call back once the refill request has been completed: [] Yes [x] No      Farzaneh Obrien Rep   03/22/23 12:14 EDT           "

## 2023-03-31 ENCOUNTER — LAB (OUTPATIENT)
Dept: LAB | Facility: HOSPITAL | Age: 68
End: 2023-03-31
Payer: MEDICARE

## 2023-03-31 ENCOUNTER — OFFICE VISIT (OUTPATIENT)
Dept: CARDIOLOGY | Facility: CLINIC | Age: 68
End: 2023-03-31
Payer: MEDICARE

## 2023-03-31 VITALS
WEIGHT: 194.4 LBS | BODY MASS INDEX: 26.33 KG/M2 | SYSTOLIC BLOOD PRESSURE: 118 MMHG | HEART RATE: 54 BPM | DIASTOLIC BLOOD PRESSURE: 72 MMHG | HEIGHT: 72 IN

## 2023-03-31 DIAGNOSIS — I10 ESSENTIAL HYPERTENSION: ICD-10-CM

## 2023-03-31 DIAGNOSIS — E78.5 DYSLIPIDEMIA: ICD-10-CM

## 2023-03-31 DIAGNOSIS — Z95.5 STATUS POST INSERTION OF DRUG-ELUTING STENT INTO RIGHT CORONARY ARTERY FOR CORONARY ARTERY DISEASE: ICD-10-CM

## 2023-03-31 DIAGNOSIS — I25.2 HISTORY OF ST ELEVATION MYOCARDIAL INFARCTION (STEMI): Primary | ICD-10-CM

## 2023-03-31 DIAGNOSIS — I25.118 CORONARY ARTERY DISEASE OF NATIVE ARTERY OF NATIVE HEART WITH STABLE ANGINA PECTORIS: ICD-10-CM

## 2023-03-31 LAB
CHOLEST SERPL-MCNC: 116 MG/DL (ref 0–200)
HDLC SERPL-MCNC: 40 MG/DL (ref 40–60)
LDLC SERPL CALC-MCNC: 56 MG/DL (ref 0–100)
LDLC/HDLC SERPL: 1.36 {RATIO}
TRIGL SERPL-MCNC: 109 MG/DL (ref 0–150)
VLDLC SERPL-MCNC: 20 MG/DL (ref 5–40)

## 2023-03-31 PROCEDURE — 99214 OFFICE O/P EST MOD 30 MIN: CPT | Performed by: NURSE PRACTITIONER

## 2023-03-31 PROCEDURE — 80061 LIPID PANEL: CPT

## 2023-03-31 PROCEDURE — 36415 COLL VENOUS BLD VENIPUNCTURE: CPT

## 2023-03-31 PROCEDURE — 3078F DIAST BP <80 MM HG: CPT | Performed by: NURSE PRACTITIONER

## 2023-03-31 PROCEDURE — 3074F SYST BP LT 130 MM HG: CPT | Performed by: NURSE PRACTITIONER

## 2023-03-31 RX ORDER — ATORVASTATIN CALCIUM 20 MG/1
20 TABLET, FILM COATED ORAL NIGHTLY
Qty: 90 TABLET | Refills: 3
Start: 2023-03-31

## 2023-03-31 NOTE — PROGRESS NOTES
Date of Office Visit: 10/13/2022  Encounter Provider: VARINDER Carlisle  Place of Service: [facility]  Patient Name: Flavio Frost  :1955    No chief complaint on file.  : follow up    HPI: Flavio Frost is a 67 y.o. male who is a patient of Dr. Leyva.  He presents for a 6-month office follow-up.  He has a history of dyslipidemia, tobacco abuse and hypertension.  He has coronary artery disease with an inferior MI in 2018 for which he had thrombectomy and CARRIE placement to RCA.  During that intervention, EF was 30%, follow-up echo showed EF 70%.       Patient underwent exercise Cardiolite stress test on 2022 which showed normal myocardial perfusion study with no evidence of ischemia, EF 60%. Echocardiogram on 2022 showed normal left ventricular function and normal LV cavity size and wall thickness.  There is mild calcification of aortic valve with no aortic regurgitation or stenosis.  Mitral valve structurally normal with no regurgitation or stenosis.  Normal RV size and systolic function and normal left and right atrium.      Patient had previous 14-day Holter monitor in 2021 that was relatively benign.  Showed episodes of junctional rhythm.  Palpitations were associated with isolated PACs, PVCs and junctional rhythm.  There were some occasional episodes of dizziness reported.  No evidence of atrial arrhythmias, supraventricular tachycardia or VT.  Normal SA node conduction and no AV block.  He also had a relatively benign 48-hour Holter monitor in 2018.    Patient presents today with no complaints.  His blood pressure is well controlled.  EKG is stable.  Lipid panel in target range.  He stopped smoking when he had his MI in 2018.  Patient has been eating much better since his heart attack.  His only complaint is that he has a little bit of brain fog, worse on days that he is not very active.  Has been ongoing and nothing new.    Previous  testing and notes have been reviewed by me.   Past Medical History:   Diagnosis Date   • BPH (benign prostatic hyperplasia)    • Bradycardia    • Cancer (HCC)     skin cancer   • Coronary artery disease    • Enlarged prostate    • Hyperlipidemia    • Hypertension    • Hypotension    • Myocardial infarction (HCC)        Past Surgical History:   Procedure Laterality Date   • CARDIAC CATHETERIZATION     • CARDIAC CATHETERIZATION N/A 2018    Procedure: Left Heart Cath;  Surgeon: Giovanny Leyva MD;  [facility];  Service: Cardiology   • CARDIAC CATHETERIZATION N/A 2018    Procedure: Stent CARRIE coronary;  Surgeon: Giovanny Leyva MD;  Location:  [facility] CATH INVASIVE LOCATION;  Service: Cardiology   • CARDIAC CATHETERIZATION N/A 2018    Procedure: Coronary angiography;  Surgeon: Giovanny Leyva MD;  Location:  [facility] CATH INVASIVE LOCATION;  Service: Cardiology   • CARDIAC CATHETERIZATION N/A 2018    Procedure: Left ventriculography;  Surgeon: Giovanny Leyva MD;  [facility];  Service: Cardiology   • CARDIAC CATHETERIZATION  2018    Procedure: Percutaneous Mechanical Thrombectomy;  Surgeon: Giovanny Leyva MD;  Location:  [facility] CATH INVASIVE LOCATION;  Service: Cardiology   • GA RT/LT HEART CATHETERS N/A 2018    Procedure: Percutaneous Coronary Intervention;  Surgeon: Giovanny Leyva MD;  Location:  [facility] CATH INVASIVE LOCATION;  Service: Cardiology   • SKIN BIOPSY     • SKIN CANCER EXCISION      Basal cell carcinoma x 2, malignant melanoma x 1   • WRIST SURGERY Left        Social History     Socioeconomic History   • Marital status: Single   Tobacco Use   • Smoking status: Former     Packs/day: 0.00     Years: 0.00     Pack years: 0.00     Types: Electronic Cigarette, Cigarettes     Start date: 1972     Quit date: 2018     Years since quittin.2   • Smokeless tobacco: Never    • Tobacco comments:     Quit smoking cigarettes 8/2018, now just uses ECig   Vaping Use   • Vaping Use: Every day   • Start date: 8/1/2018   • Substances: Nicotine   Substance and Sexual Activity   • Alcohol use: Yes     Comment: Less than 1 drink a week   • Drug use: Never   • Sexual activity: Not Currently       Family History   Problem Relation Age of Onset   • Heart attack Mother 60   • Heart disease Mother    • Hypertension Mother    • Diabetes Mother    • Heart attack Father 43   • Heart disease Father    • No Known Problems Daughter    • No Known Problems Daughter        Review of Systems   Constitutional: Negative.   HENT: Negative.    Eyes: Negative.    Cardiovascular: Negative.    Respiratory: Negative.    Endocrine: Negative.    Hematologic/Lymphatic: Negative.    Skin: Negative.    Musculoskeletal: Negative.    Gastrointestinal: Negative.    Genitourinary: Negative.    Neurological: Negative.    Psychiatric/Behavioral: Negative.    Allergic/Immunologic: Negative.        Allergies   Allergen Reactions   • Penicillins Hives   • Sertraline Anxiety     ?halllucinations         Current Outpatient Medications:   •  aspirin 81 MG chewable tablet, Chew 1 tablet Daily., Disp: 30 tablet, Rfl: 11  •  atorvastatin (LIPITOR) 20 MG tablet, Take 1 tablet by mouth Every Night., Disp: 90 tablet, Rfl: 3  •  lisinopril (PRINIVIL,ZESTRIL) 10 MG tablet, Take 1 tablet by mouth Daily., Disp: 90 tablet, Rfl: 3  •  nitroglycerin (NITROSTAT) 0.4 MG SL tablet, 1 under the tongue as needed for angina, may repeat q5mins for up three doses (Patient taking differently: 1 under the tongue as needed for angina, may repeat q5mins for up three doses  Has them in his pocket, however he dose not generally use them), Disp: 45 tablet, Rfl: 11  •  pantoprazole (Protonix) 40 MG EC tablet, Take 1 tablet by mouth Daily., Disp: 90 tablet, Rfl: 3  •  tamsulosin (FLOMAX) 0.4 MG capsule 24 hr capsule, Take 1 capsule by mouth Daily., Disp: 90  "capsule, Rfl: 1      Objective:     Vitals:    03/31/23 1436   BP: 118/72   Pulse: 54   Weight: 88.2 kg (194 lb 6.4 oz)   Height: 182.9 cm (72.01\")     Body mass index is 26.36 kg/m².     2D echocardiogram 9/30/2022:  • Calculated left ventricular EF = 63.5% Estimated left ventricular EF = 64% Estimated left ventricular EF was in agreement with the calculated left ventricular EF. Left ventricular systolic function is normal. Normal left ventricular cavity size and wall thickness noted. All left ventricular wall segments contract normally. Left ventricular diastolic function was normal.  • No aortic valve regurgitation or stenosis is present. The aortic valve is abnormal in structure. There is mild calcification of the aortic valve.    Cardiolite exercise stress test 8/28/2022:  • Findings consistent with a normal ECG stress test.  • Left ventricular ejection fraction is normal. (Calculated EF = 60%).  • Myocardial perfusion imaging indicates a normal myocardial perfusion study with no evidence of ischemia.  • Impressions are consistent with a low risk study.        PHYSICAL EXAM:    Constitutional:       Appearance: Healthy appearance. Not in distress.   Neck:      Vascular: No JVR. JVD normal.   Pulmonary:      Effort: Pulmonary effort is normal.      Breath sounds: Normal breath sounds. No wheezing. No rhonchi. No rales.   Chest:      Chest wall: Not tender to palpatation.   Cardiovascular:      PMI at left midclavicular line. Normal rate. Regular rhythm. Normal S1. Normal S2.      Murmurs: There is no murmur.      No gallop. No click. No rub.   Pulses:     Intact distal pulses.   Edema:     Peripheral edema absent.   Abdominal:      General: Bowel sounds are normal.      Palpations: Abdomen is soft.      Tenderness: There is no abdominal tenderness.   Musculoskeletal: Normal range of motion.         General: No tenderness. Skin:     General: Skin is warm and dry.   Neurological:      General: No focal deficit " present.      Mental Status: Alert and oriented to person, place and time.           ECG 12 Lead    Date/Time: 3/31/2023 3:58 PM  Performed by: Steph Rios APRN  Authorized by: Steph Rios APRN   Comparison: compared with previous ECG from 12/14/2022  Similar to previous ECG  Rhythm: sinus rhythm  BPM: 54  Conduction: conduction normal  ST Segments: ST segments normal  T Waves: T waves normal                Assessment:       Diagnosis Plan   1. History of ST elevation myocardial infarction (STEMI)        2. Coronary artery disease of native artery of native heart with stable angina pectoris (HCC)        3. Status post insertion of drug-eluting stent into right coronary artery for coronary artery disease        4. Essential hypertension          No orders of the defined types were placed in this encounter.         Plan:       1.  Coronary artery disease: History of inferior MI in 2018 status post thrombectomy and CARRIE to RCA.  Remains on aspirin and statin.  No beta-blocker as patient with history of junctional rhythm.  Stable EKG. No angina  2.  Hypertension: Well-controlled.  He is on lisinopril and questions if he really needs it.  I have asked him to monitor his blood pressure on a consistent basis twice a day for the next week to 2 weeks.  If his blood pressure remains controlled, we can try him off of the lisinopril and see how he does.  3.  Hyperlipidemia: In target range on statin therapy.  Patient started cutting his atorvastatin in half and is only taken 20 mg for the last 3 months.  We will continue 20 mg.  Patient is very particular about his diet  4.  H/o tobacco abuse: Stopped smoking after MI in 2018.  Stopped vaping about 4 months ago.  5.  Junctional rhythm: Intermittent.  Not on beta-blockers    Mr. Frost will follow up with Dr. Dahl in 1 year.  He will call sooner for any questions or concerns.           Your medication list          Accurate as of March 31, 2023  3:58 PM. If you  have any questions, ask your nurse or doctor.            CHANGE how you take these medications      Instructions Last Dose Given Next Dose Due   atorvastatin 20 MG tablet  Commonly known as: LIPITOR  What changed: medication strength  Changed by: VARINDER Carlisle      Take 1 tablet by mouth Every Night.       nitroglycerin 0.4 MG SL tablet  Commonly known as: NITROSTAT  What changed: additional instructions      1 under the tongue as needed for angina, may repeat q5mins for up three doses          CONTINUE taking these medications      Instructions Last Dose Given Next Dose Due   aspirin 81 MG chewable tablet      Chew 1 tablet Daily.       lisinopril 10 MG tablet  Commonly known as: PRINIVIL,ZESTRIL      Take 1 tablet by mouth Daily.       pantoprazole 40 MG EC tablet  Commonly known as: Protonix      Take 1 tablet by mouth Daily.       tamsulosin 0.4 MG capsule 24 hr capsule  Commonly known as: FLOMAX      Take 1 capsule by mouth Daily.             Where to Get Your Medications      Information about where to get these medications is not yet available    Ask your nurse or doctor about these medications  · atorvastatin 20 MG tablet           As always, it has been a pleasure to participate in your patient's care.      Sincerely,       VARINDER Segal

## 2023-04-11 RX ORDER — ATORVASTATIN CALCIUM 20 MG/1
20 TABLET, FILM COATED ORAL NIGHTLY
Qty: 90 TABLET | Refills: 3 | Status: SHIPPED | OUTPATIENT
Start: 2023-04-11

## 2023-04-11 NOTE — TELEPHONE ENCOUNTER
Patient saw you 3/31/23.  At that time he was cutting his atorvastatin in half and taking 20 mg daily.  He was in target range for his HLD.  He is out of atorvastatin and needs a refill to [facility].    I have pended it for you, if you can approve it?    Thanks!    Maura Esposito RN  [facility] Triage  04/11/23 10:38 EDT

## 2023-04-11 NOTE — TELEPHONE ENCOUNTER
This medication passes the protocol so I sent it in.    Maura sEposito RN  [facility] Triage  04/11/23 13:40 EDT

## 2023-09-25 RX ORDER — TAMSULOSIN HYDROCHLORIDE 0.4 MG/1
1 CAPSULE ORAL DAILY
Qty: 90 CAPSULE | Refills: 1 | OUTPATIENT
Start: 2023-09-25

## 2023-09-26 RX ORDER — LISINOPRIL 10 MG/1
10 TABLET ORAL DAILY
Qty: 90 TABLET | Refills: 3 | Status: SHIPPED | OUTPATIENT
Start: 2023-09-26

## 2023-09-29 NOTE — TELEPHONE ENCOUNTER
"Caller: Flavio Frost \"Dash Frost\"    Relationship to patient: Self    Best call back number: [phone] (Home)     PATIENT STATES THAT HE SCHEDULED THE FIRST AVAILABLE WELLNESS VISIT WHICH WAS IN JANUARY 2024. PATIENT IS REQUESTING TO KNOW WHY MEDICATION REFILL WAS NOT APPROVED. PATIENT REQUESTING A CALLBACK TO DISCUSS. PLEASE ADVISE.     "
"Caller: Flavio Frost \"Dash Frost\"    Relationship: Self    Best call back number: [phone]     Requested Prescriptions:   Requested Prescriptions     Pending Prescriptions Disp Refills    tamsulosin (FLOMAX) 0.4 MG capsule 24 hr capsule 90 capsule 1     Sig: Take 1 capsule by mouth Daily.        Pharmacy where request should be sent:  [id number] - [facility] [address] [address] AT [place] [address] & [place] [address] - [phone] [facility] [phone] FX     Last office visit with prescribing clinician: 9/1/2022   Last telemedicine visit with prescribing clinician: Visit date not found   Next office visit with prescribing clinician: 1/11/2024    Additional details provided by patient:     Does the patient have less than a 3 day supply:  [] Yes  [x] No    Would you like a call back once the refill request has been completed: [x] Yes [] No    If the office needs to give you a call back, can they leave a voicemail: [x] Yes [] No    Farzaneh Lau Rep   09/25/23 10:18 EDT           "
Normal rate, regular rhythm,

## 2023-10-04 RX ORDER — TAMSULOSIN HYDROCHLORIDE 0.4 MG/1
1 CAPSULE ORAL DAILY
Qty: 90 CAPSULE | Refills: 0 | Status: SHIPPED | OUTPATIENT
Start: 2023-10-04 | End: 2023-10-05 | Stop reason: SDUPTHER

## 2023-10-05 ENCOUNTER — OFFICE VISIT (OUTPATIENT)
Dept: FAMILY MEDICINE CLINIC | Facility: CLINIC | Age: 68
End: 2023-10-05
Payer: MEDICARE

## 2023-10-05 VITALS
HEART RATE: 61 BPM | SYSTOLIC BLOOD PRESSURE: 110 MMHG | WEIGHT: 201 LBS | TEMPERATURE: 98.6 F | BODY MASS INDEX: 27.22 KG/M2 | DIASTOLIC BLOOD PRESSURE: 60 MMHG | OXYGEN SATURATION: 98 % | HEIGHT: 72 IN

## 2023-10-05 DIAGNOSIS — N40.1 BENIGN PROSTATIC HYPERPLASIA WITH LOWER URINARY TRACT SYMPTOMS, SYMPTOM DETAILS UNSPECIFIED: Primary | ICD-10-CM

## 2023-10-05 PROCEDURE — 1160F RVW MEDS BY RX/DR IN RCRD: CPT | Performed by: NURSE PRACTITIONER

## 2023-10-05 PROCEDURE — 3074F SYST BP LT 130 MM HG: CPT | Performed by: NURSE PRACTITIONER

## 2023-10-05 PROCEDURE — 99213 OFFICE O/P EST LOW 20 MIN: CPT | Performed by: NURSE PRACTITIONER

## 2023-10-05 PROCEDURE — 1159F MED LIST DOCD IN RCRD: CPT | Performed by: NURSE PRACTITIONER

## 2023-10-05 PROCEDURE — 3078F DIAST BP <80 MM HG: CPT | Performed by: NURSE PRACTITIONER

## 2023-10-05 RX ORDER — TAMSULOSIN HYDROCHLORIDE 0.4 MG/1
1 CAPSULE ORAL DAILY
Qty: 90 CAPSULE | Refills: 0 | Status: SHIPPED | OUTPATIENT
Start: 2023-10-05

## 2023-10-05 NOTE — PROGRESS NOTES
"Chief Complaint  Med Refill (Needs tamsulosin refilled)    Subjective        Flavio Frost presents to [facility]  History of Present Illness  Patient here to get medication refills, it had been since 1/2022 from his last visit. He needed a visit. He wants to do lab testing at his medicare wellness in January. His PSA levels are always normal.   Objective   Vital Signs:  /60 (BP Location: Left arm, Patient Position: Sitting, Cuff Size: Adult)   Pulse 61   Temp 98.6 °F (37 °C) (Temporal)   Ht 182.9 cm (72\")   Wt 91.2 kg (201 lb)   SpO2 98%   BMI 27.26 kg/m²   Estimated body mass index is 27.26 kg/m² as calculated from the following:    Height as of this encounter: 182.9 cm (72\").    Weight as of this encounter: 91.2 kg (201 lb).               Physical Exam  Constitutional:       Appearance: Normal appearance.   HENT:      Head: Normocephalic.   Eyes:      Extraocular Movements: Extraocular movements intact.      Pupils: Pupils are equal, round, and reactive to light.   Cardiovascular:      Rate and Rhythm: Normal rate and regular rhythm.   Pulmonary:      Effort: Pulmonary effort is normal.      Breath sounds: Normal breath sounds.   Musculoskeletal:         General: Normal range of motion.      Cervical back: Normal range of motion.   Skin:     General: Skin is warm and dry.   Neurological:      General: No focal deficit present.      Mental Status: He is alert and oriented to person, place, and time.   Psychiatric:         Mood and Affect: Mood normal.      Result Review :                   Assessment and Plan   Diagnoses and all orders for this visit:    1. Benign prostatic hyperplasia with lower urinary tract symptoms, symptom details unspecified (Primary)  -     tamsulosin (FLOMAX) 0.4 MG capsule 24 hr capsule; Take 1 capsule by mouth Daily.  Dispense: 90 capsule; Refill: 0             Follow Up   Return if symptoms worsen or fail to improve.  Patient was given " instructions and counseling regarding his condition or for health maintenance advice. Please see specific information pulled into the AVS if appropriate.

## 2023-10-30 ENCOUNTER — TELEPHONE (OUTPATIENT)
Dept: CARDIOLOGY | Facility: CLINIC | Age: 68
End: 2023-10-30

## 2023-10-30 NOTE — TELEPHONE ENCOUNTER
"Caller: Flavio Frost \"Dash Frost\"    Relationship: Self    Best call back number: [phone]    What is the best time to reach you: ANY    Who are you requesting to speak with (clinical staff, provider,  specific staff member): ANY    What was the call regarding:PATIENT WASN'T SURE IF HE NEEDED TO KEEP THIS APPT 11/6/23. HE ALSO HAS A YEAR FOLLOW UP IN APRIL 2024. DOESN'T REMEMBER IF THIS APPT WAS FOR SOMETHING SPECIAL?  SAID EVERYTHING SEEMS FINE TO HIM.    Is it okay if the provider responds through MyChart: YES      "

## 2023-12-18 DIAGNOSIS — N40.1 BENIGN PROSTATIC HYPERPLASIA WITH LOWER URINARY TRACT SYMPTOMS, SYMPTOM DETAILS UNSPECIFIED: ICD-10-CM

## 2023-12-18 RX ORDER — TAMSULOSIN HYDROCHLORIDE 0.4 MG/1
1 CAPSULE ORAL DAILY
Qty: 90 CAPSULE | Refills: 0 | Status: SHIPPED | OUTPATIENT
Start: 2023-12-18

## 2023-12-18 NOTE — TELEPHONE ENCOUNTER
"  Caller: Flavio Frost \"Dash Margot\"    Relationship: Self    Best call back number: [phone]     Requested Prescriptions:   Requested Prescriptions     Pending Prescriptions Disp Refills    tamsulosin (FLOMAX) 0.4 MG capsule 24 hr capsule 90 capsule 0     Sig: Take 1 capsule by mouth Daily.        Pharmacy where request should be sent: [facility] #[id number] - [facility] [address] [facility] [address]  AT [facility] [address] [place] [address] [address] [phone] [facility] [phone] FX     Last office visit with prescribing clinician: 9/1/2022   Last telemedicine visit with prescribing clinician: Visit date not found   Next office visit with prescribing clinician: 1/11/2024     Additional details provided by patient: PATIENT STATED THAT HE WILL RUN OUT OF MEDICATION ON 01/01/2024 AND WILL NEED A REFILL UNTIL HE CAN GET IN TO SEE DR. PEREZ ON 01/11/2024. PATIENT IS ASKING WHAT HE WILL NEED TO DO IF HE IS UNABLE TO GET MEDICATION BEFORE 01/11/2024. PLEASE ADVISE.    Does the patient have less than a 3 day supply:  [] Yes  [x] No    Would you like a call back once the refill request has been completed: [x] Yes [] No    If the office needs to give you a call back, can they leave a voicemail: [x] Yes [] No    Farzaneh Davila Rep   12/18/23 10:12 EST     "

## 2024-01-11 ENCOUNTER — OFFICE VISIT (OUTPATIENT)
Dept: FAMILY MEDICINE CLINIC | Facility: CLINIC | Age: 69
End: 2024-01-11
Payer: MEDICARE

## 2024-01-11 VITALS
WEIGHT: 202.4 LBS | TEMPERATURE: 98 F | SYSTOLIC BLOOD PRESSURE: 120 MMHG | DIASTOLIC BLOOD PRESSURE: 76 MMHG | HEIGHT: 72 IN | OXYGEN SATURATION: 96 % | HEART RATE: 65 BPM | BODY MASS INDEX: 27.41 KG/M2

## 2024-01-11 DIAGNOSIS — Z00.00 MEDICARE ANNUAL WELLNESS VISIT, SUBSEQUENT: Primary | ICD-10-CM

## 2024-01-11 DIAGNOSIS — Z28.21 COVID-19 VACCINE DOSE DECLINED: ICD-10-CM

## 2024-01-11 DIAGNOSIS — Z28.21 INFLUENZA VACCINATION DECLINED: ICD-10-CM

## 2024-01-11 DIAGNOSIS — E78.5 DYSLIPIDEMIA: ICD-10-CM

## 2024-01-11 DIAGNOSIS — Z12.11 SCREENING FOR COLON CANCER: ICD-10-CM

## 2024-01-11 DIAGNOSIS — I10 ESSENTIAL HYPERTENSION: ICD-10-CM

## 2024-01-11 DIAGNOSIS — Z12.2 SCREENING FOR LUNG CANCER: ICD-10-CM

## 2024-01-11 DIAGNOSIS — I25.118 CORONARY ARTERY DISEASE OF NATIVE ARTERY OF NATIVE HEART WITH STABLE ANGINA PECTORIS: ICD-10-CM

## 2024-01-11 DIAGNOSIS — Z95.5 STATUS POST INSERTION OF DRUG-ELUTING STENT INTO RIGHT CORONARY ARTERY FOR CORONARY ARTERY DISEASE: ICD-10-CM

## 2024-01-11 DIAGNOSIS — Z13.6 ENCOUNTER FOR ABDOMINAL AORTIC ANEURYSM (AAA) SCREENING: ICD-10-CM

## 2024-01-11 DIAGNOSIS — N40.1 BENIGN PROSTATIC HYPERPLASIA WITH LOWER URINARY TRACT SYMPTOMS, SYMPTOM DETAILS UNSPECIFIED: ICD-10-CM

## 2024-01-11 DIAGNOSIS — F17.211 NICOTINE DEPENDENCE, CIGARETTES, IN REMISSION: ICD-10-CM

## 2024-01-11 DIAGNOSIS — Z12.5 SPECIAL SCREENING, PROSTATE CANCER: ICD-10-CM

## 2024-01-11 RX ORDER — TAMSULOSIN HYDROCHLORIDE 0.4 MG/1
1 CAPSULE ORAL DAILY
Qty: 90 CAPSULE | Refills: 3 | Status: SHIPPED | OUTPATIENT
Start: 2024-01-11

## 2024-01-11 NOTE — PROGRESS NOTES
The ABCs of the Annual Wellness Visit  Subsequent Medicare Wellness Visit    Subjective    Flavio Frost is a 68 y.o. male who presents for a Subsequent Medicare Wellness Visit.    Follow-up for cholesterol with history of CAD s/p stent.  Currently, has been feeling well without any myalgias, muscle aches, weakness, numbness, chest pain, short of breath or other issues.  Currently, is adherent with medication regimen of atorvastatin 40 mg and denies medication side effects.  Last labs done 3/31/23.     Follow-up for hypertension and CAD.  Currently, has been feeling well and asymptomatic without any headaches, vision changes, cough, chest pain, shortness of breath, swelling, focal neurologic deficit, memory loss or syncope.  Has been taking the medications regularly and adherent with the regimen lisinopril 10 mg and aspirin 81 mg.  Denies medication side effects and no significant interval events.      The following portions of the patient's history were reviewed and updated as appropriate: allergies, current medications, past family history, past medical history, past social history, past surgical history, and problem list.    Compared to one year ago, the patient feels his physical health is the same.    Compared to one year ago, the patient feels his mental health is the same.    Recent Hospitalizations:  He was not admitted to the hospital during the last year.     Current Medical Providers:  Patient Care Team:  Jak Link MD as PCP - General (Internal Medicine)  Giovanny Leyva MD as Consulting Physician (Cardiology)    Outpatient Medications Prior to Visit   Medication Sig Dispense Refill    aspirin 81 MG chewable tablet Chew 1 tablet Daily. 30 tablet 11    atorvastatin (LIPITOR) 20 MG tablet Take 1 tablet by mouth Every Night. 90 tablet 3    lisinopril (PRINIVIL,ZESTRIL) 10 MG tablet Take 1 tablet by mouth Daily. 90 tablet 3    nitroglycerin (NITROSTAT) 0.4 MG SL tablet 1 under the tongue as  needed for angina, may repeat q5mins for up three doses (Patient taking differently: 1 under the tongue as needed for angina, may repeat q5mins for up three doses    Has them in his pocket, however he dose not generally use them) 45 tablet 11    tamsulosin (FLOMAX) 0.4 MG capsule 24 hr capsule Take 1 capsule by mouth Daily. 90 capsule 0     No facility-administered medications prior to visit.       No opioid medication identified on active medication list. I have reviewed chart for other potential  high risk medication/s and harmful drug interactions in the elderly.      Aspirin is on active medication list. Aspirin use is indicated based on review of current medical condition/s. Pros and cons of this therapy have been discussed today. Benefits of this medication outweigh potential harm.  Patient has been encouraged to continue taking this medication.  .    Patient Active Problem List   Diagnosis    Acute MI, inferior wall    BPH (benign prostatic hyperplasia)    DDD (degenerative disc disease), cervical    Disequilibrium    Dyslipidemia    Hematochezia    Inguinal hernia    TIA (transient ischemic attack)    Vertigo    Tricuspid regurgitation    Status post insertion of drug-eluting stent into right coronary artery for coronary artery disease    Heart palpitations    Dizziness    Coronary artery disease    Hyperlipidemia    Essential hypertension    Chronic fatigue    Chest discomfort    Medicare annual wellness visit, subsequent    History of ST elevation myocardial infarction (STEMI)    Influenza vaccination declined    COVID-19 vaccine dose declined    Pneumococcal vaccination declined     Advance Care Planning   Advance Care Planning     Advance Directive is not on file.  ACP discussion was held with the patient during this visit. Patient does not have an advance directive, declines further assistance.     Objective    Vitals:    01/11/24 1415   BP: 120/76   BP Location: Left arm   Patient Position: Sitting  "  Cuff Size: Adult   Pulse: 65   Temp: 98 °F (36.7 °C)   TempSrc: Temporal   SpO2: 96%   Weight: 91.8 kg (202 lb 6.4 oz)   Height: 182.9 cm (72.01\")     Estimated body mass index is 27.44 kg/m² as calculated from the following:    Height as of this encounter: 182.9 cm (72.01\").    Weight as of this encounter: 91.8 kg (202 lb 6.4 oz).    Does the patient have evidence of cognitive impairment? No, Mini-cog 5 out of 5        HEALTH RISK ASSESSMENT    Smoking Status:  Social History     Tobacco Use   Smoking Status Former    Packs/day: 0.00    Years: 0.00    Additional pack years: 0.00    Total pack years: 0.00    Types: Electronic Cigarette, Cigarettes    Start date: 1972    Quit date: 2018    Years since quittin.9   Smokeless Tobacco Never   Tobacco Comments    Quit smoking cigarettes 2018, now just uses ECig     Alcohol Consumption:  Social History     Substance and Sexual Activity   Alcohol Use Yes    Comment: Less than 1 drink a week     Fall Risk Screen:    STEADI Fall Risk Assessment was completed, and patient is at LOW risk for falls.Assessment completed on:2024    Depression Screenin/11/2024     2:29 PM   PHQ-2/PHQ-9 Depression Screening   Little Interest or Pleasure in Doing Things 0-->not at all   Feeling Down, Depressed or Hopeless 0-->not at all   PHQ-9: Brief Depression Severity Measure Score 0     Health Habits and Functional and Cognitive Screenin/11/2024     2:28 PM   Functional & Cognitive Status   Do you have difficulty preparing food and eating? No   Do you have difficulty bathing yourself, getting dressed or grooming yourself? No   Do you have difficulty using the toilet? No   Do you have difficulty moving around from place to place? No   Do you have trouble with steps or getting out of a bed or a chair? No   Current Diet Other   Dental Exam Up to date   Eye Exam Up to date   Exercise (times per week) 0 times per week   Current Exercises Include Other   Do you " need help using the phone?  No   Are you deaf or do you have serious difficulty hearing?  No   Do you need help to go to places out of walking distance? No   Do you need help shopping? No   Do you need help preparing meals?  No   Do you need help with housework?  No   Do you need help with laundry? No   Do you need help taking your medications? No   Do you need help managing money? No   Do you ever drive or ride in a car without wearing a seat belt? No   Have you felt unusual stress, anger or loneliness in the last month? No   Who do you live with? Alone   If you need help, do you have trouble finding someone available to you? No   Have you been bothered in the last four weeks by sexual problems? No   Do you have difficulty concentrating, remembering or making decisions? Yes     Age-appropriate Screening Schedule:  Refer to the list below for future screening recommendations based on patient's age, sex and/or medical conditions. Orders for these recommended tests are listed in the plan section. The patient has been provided with a written plan.    Health Maintenance   Topic Date Due    COLORECTAL CANCER SCREENING  Never done    HEPATITIS C SCREENING  Never done    AAA SCREEN (ONE-TIME)  Never done    INFLUENZA VACCINE  03/31/2024 (Originally 8/1/2023)    COVID-19 Vaccine (1) 01/01/2025 (Originally 4/26/1956)    ZOSTER VACCINE (1 of 2) 01/01/2025 (Originally 10/26/2006)    Pneumococcal Vaccine 65+ (1 of 2 - PCV) 01/11/2025 (Originally 10/26/1962)    TDAP/TD VACCINES (1 - Tdap) 01/11/2025 (Originally 10/26/1975)    LIPID PANEL  03/31/2024    BMI FOLLOWUP  10/05/2024    ANNUAL WELLNESS VISIT  01/11/2025        CMS Preventative Services Quick Reference  Risk Factors Identified During Encounter  Immunizations Discussed/Encouraged: Tdap, Influenza, Prevnar 20 (Pneumococcal 20-valent conjugate), Shingrix, COVID19, and RSV (Respiratory Syncytial Virus)  The above risks/problems have been discussed with the  patient.  Pertinent information has been shared with the patient in the After Visit Summary.  An After Visit Summary and PPPS were made available to the patient.  Diagnoses and all orders for this visit:    1. Medicare annual wellness visit, subsequent (Primary)    2. Essential hypertension  -     Comprehensive Metabolic Panel  -     Lipid Panel    3. Dyslipidemia  -     Comprehensive Metabolic Panel  -     Lipid Panel    4. Coronary artery disease of native artery of native heart with stable angina pectoris    5. Status post insertion of drug-eluting stent into right coronary artery for coronary artery disease    6. Screening for colon cancer  -     Cologuard - Stool, Per Rectum; Future    7. Encounter for abdominal aortic aneurysm (AAA) screening  -      aaa screen limited; Future    8. Special screening, prostate cancer  -     PSA Screen    9. Screening for lung cancer  -     CT Chest Low Dose Wo; Future    10. Influenza vaccination declined    11. COVID-19 vaccine dose declined    12. Nicotine dependence, cigarettes, in remission  -     CT Chest Low Dose Wo; Future    13. Benign prostatic hyperplasia with lower urinary tract symptoms, symptom details unspecified  -     tamsulosin (FLOMAX) 0.4 MG capsule 24 hr capsule; Take 1 capsule by mouth Daily.  Dispense: 90 capsule; Refill: 3      Annual wellness visit reviewed with patient.  All past history, medications, social history, and problem list were reviewed.  Discussed advanced directives and living will.  Patient has living will: Living will: no and information packet given to patient to complete at home and bring copy to office.  Discussed fall risk and precautions encourage removing throw rugs and using grab bars within the home and bathroom.  Will check the labs as ordered above to evaluate the blood sugars, kidney, liver, cholesterol for screening.  Discussed flu shot recommended to get the high-dose influenza vaccine annually in the fall.    Influenza,  RSV, Tdap, prevnar-20, Covid series, and Shingrix vaccination series recommended.  Encourage follow-up with the eye doctor on annual basis for glaucoma evaluation.  Discussed weight and encouraged exercise as tolerated while following a healthy diet.  Colon cancer screening discussed and current status: cologuard ordered.  Discussed prostate screening for which he is currently due for evaluation.     Follow Up:   Next Medicare Wellness visit to be scheduled in 1 year.

## 2024-01-12 LAB
ALBUMIN SERPL-MCNC: 4.4 G/DL (ref 3.9–4.9)
ALBUMIN/GLOB SERPL: 1.8 {RATIO} (ref 1.2–2.2)
ALP SERPL-CCNC: 68 IU/L (ref 44–121)
ALT SERPL-CCNC: 17 IU/L (ref 0–44)
AST SERPL-CCNC: 19 IU/L (ref 0–40)
BILIRUB SERPL-MCNC: 0.3 MG/DL (ref 0–1.2)
BUN SERPL-MCNC: 9 MG/DL (ref 8–27)
BUN/CREAT SERPL: 9 (ref 10–24)
CALCIUM SERPL-MCNC: 9.6 MG/DL (ref 8.6–10.2)
CHLORIDE SERPL-SCNC: 101 MMOL/L (ref 96–106)
CHOLEST SERPL-MCNC: 125 MG/DL (ref 100–199)
CO2 SERPL-SCNC: 25 MMOL/L (ref 20–29)
CREAT SERPL-MCNC: 0.98 MG/DL (ref 0.76–1.27)
EGFRCR SERPLBLD CKD-EPI 2021: 85 ML/MIN/1.73
GLOBULIN SER CALC-MCNC: 2.4 G/DL (ref 1.5–4.5)
GLUCOSE SERPL-MCNC: 91 MG/DL (ref 70–99)
HDLC SERPL-MCNC: 39 MG/DL
LDLC SERPL CALC-MCNC: 67 MG/DL (ref 0–99)
POTASSIUM SERPL-SCNC: 5 MMOL/L (ref 3.5–5.2)
PROT SERPL-MCNC: 6.8 G/DL (ref 6–8.5)
PSA SERPL-MCNC: 1.4 NG/ML (ref 0–4)
SODIUM SERPL-SCNC: 138 MMOL/L (ref 134–144)
TRIGL SERPL-MCNC: 103 MG/DL (ref 0–149)
VLDLC SERPL CALC-MCNC: 19 MG/DL (ref 5–40)

## 2024-03-15 RX ORDER — ATORVASTATIN CALCIUM 20 MG/1
20 TABLET, FILM COATED ORAL NIGHTLY
Qty: 90 TABLET | Refills: 3 | Status: SHIPPED | OUTPATIENT
Start: 2024-03-15 | End: 2024-03-18

## 2024-03-15 NOTE — TELEPHONE ENCOUNTER
"    Caller: Flavio Frost \"Dash Frost\"    Relationship: Self    Best call back number:  [phone]      Requested Prescriptions:   Requested Prescriptions     Pending Prescriptions Disp Refills    atorvastatin (LIPITOR) 20 MG tablet 90 tablet 3     Sig: Take 1 tablet by mouth Every Night.        Pharmacy where request should be sent: [id number] - [facility] [address]  AT [place] [address] & [place] [address] - [phone] [facility] [phone] FX     Last office visit with prescribing clinician: Visit date not found   Last telemedicine visit with prescribing clinician: Visit date not found   Next office visit with prescribing clinician: 4/18/2024     Additional details provided by patient:      Does the patient have less than a 3 day supply:  [] Yes  [x] No    Would you like a call back once the refill request has been completed: [] Yes [] No    If the office needs to give you a call back, can they leave a voicemail: [] Yes [] No    Farzaneh Queen Rep   03/15/24 10:39 EDT     "

## 2024-03-17 ENCOUNTER — NURSE TRIAGE (OUTPATIENT)
Dept: CALL CENTER | Facility: HOSPITAL | Age: 69
End: 2024-03-17
Payer: MEDICARE

## 2024-03-17 ENCOUNTER — HOSPITAL ENCOUNTER (EMERGENCY)
Facility: HOSPITAL | Age: 69
Discharge: HOME | End: 2024-03-17
Attending: STUDENT IN AN ORGANIZED HEALTH CARE EDUCATION/TRAINING PROGRAM | Admitting: STUDENT IN AN ORGANIZED HEALTH CARE EDUCATION/TRAINING PROGRAM
Payer: MEDICARE

## 2024-03-17 VITALS
DIASTOLIC BLOOD PRESSURE: 85 MMHG | SYSTOLIC BLOOD PRESSURE: 118 MMHG | RESPIRATION RATE: 18 BRPM | HEIGHT: 72 IN | OXYGEN SATURATION: 98 % | HEART RATE: 68 BPM | TEMPERATURE: 97.6 F | WEIGHT: 205 LBS | BODY MASS INDEX: 27.77 KG/M2

## 2024-03-17 DIAGNOSIS — N40.1 BENIGN PROSTATIC HYPERPLASIA WITH LOWER URINARY TRACT SYMPTOMS, SYMPTOM DETAILS UNSPECIFIED: ICD-10-CM

## 2024-03-17 DIAGNOSIS — H10.11 ACUTE ALLERGIC CONJUNCTIVITIS OF RIGHT EYE: Primary | ICD-10-CM

## 2024-03-17 PROCEDURE — 99283 EMERGENCY DEPT VISIT LOW MDM: CPT

## 2024-03-17 RX ORDER — NEOMYCIN POLYMYXIN B SULFATES AND DEXAMETHASONE 3.5; 10000; 1 MG/ML; [USP'U]/ML; MG/ML
1 SUSPENSION/ DROPS OPHTHALMIC 4 TIMES DAILY
Qty: 40 EACH | Refills: 0 | Status: SHIPPED | OUTPATIENT
Start: 2024-03-17 | End: 2024-03-27

## 2024-03-17 RX ORDER — TETRACAINE HYDROCHLORIDE 5 MG/ML
2 SOLUTION OPHTHALMIC ONCE
Status: COMPLETED | OUTPATIENT
Start: 2024-03-17 | End: 2024-03-17

## 2024-03-17 RX ORDER — OFLOXACIN 3 MG/ML
1 SOLUTION/ DROPS OPHTHALMIC ONCE
Status: DISCONTINUED | OUTPATIENT
Start: 2024-03-17 | End: 2024-03-17

## 2024-03-17 RX ORDER — NEOMYCIN POLYMYXIN B SULFATES AND DEXAMETHASONE 3.5; 10000; 1 MG/ML; [USP'U]/ML; MG/ML
1 SUSPENSION/ DROPS OPHTHALMIC ONCE
Status: COMPLETED | OUTPATIENT
Start: 2024-03-17 | End: 2024-03-17

## 2024-03-17 RX ADMIN — TETRACAINE HYDROCHLORIDE 2 DROP: 5 SOLUTION OPHTHALMIC at 16:22

## 2024-03-17 RX ADMIN — FLUORESCEIN SODIUM 1 STRIP: 1 STRIP OPHTHALMIC at 16:22

## 2024-03-17 RX ADMIN — NEOMYCIN SULFATE, POLYMYXIN B SULFATE AND DEXAMETHASONE 1 DROP: 3.5; 10000; 1 SUSPENSION OPHTHALMIC at 17:52

## 2024-03-17 NOTE — TELEPHONE ENCOUNTER
I got sawdust in my eye Thursday, it is so irritated, I am having issues with watering and eye is very irritated, light sensitative I need to know what to do? How much will it cost me ? Told him not sure of cost .Triage done told needs to be seen in ER , He really does not want to come to ER if does not have to. Told him protocol says be seen in ER, he says appreciates the advice, he will see.

## 2024-03-17 NOTE — TELEPHONE ENCOUNTER
Reason for Disposition   [1] Blurred vision AND [2] persists > 1 hour since irrigation (regardless of duration of flushing)    Additional Information   Negative: [1] Major bleeding (e.g., actively dripping or spurting) AND [2] can't be stopped   Negative: Knocked out (unconscious) > 1 minute   Negative: Difficult to awaken or acting confused (e.g., disoriented, slurred speech)   Negative: Severe neck pain   Negative: Sounds like a life-threatening emergency to the triager   Negative: Wound looks infected   Negative: Foreign body in the eye   Negative: Head injury is main concern   Negative: Neck injury is main concern   Negative: Vision is blurred or lost in either eye   Negative: Double vision or unable to look upwards   Negative: Cut on eyelid or eyeball  (Exception: Superficial scratch on eyelid.)   Negative: [1] Bleeding AND [2] won't stop after 10 minutes of direct pressure (using correct technique)   Negative: Skin is split open or gaping (or length > 1/4 inch or 6 mm)   Negative: Bloody or cloudy fluid behind the cornea (clear part)   Negative: Sharp object hit the eye (e.g., metallic chip or flying glass)   Negative: Foreign body (FB) hit eye at high speed (e.g., small metallic chip from hammering, lawnmower, BB gun, explosion)   Negative: Two black eyes (bilateral)   Negative: Sounds like a serious injury to the triager   Negative: [1] SEVERE pain AND [2] not improved 2 hours after pain medicine/ice packs   Negative: [1] Eye pain AND [2] light increases pain   Negative: Flashes of light or floaters in the eye   Negative: [1] Unequal pupils AND [2] new-onset after eye injury   Negative: Constant tearing or blinking   Negative: Patient keeps the eye covered or refuses to open it   Negative: Suspicious history for the injury   Negative: Patient is confused or is an unreliable provider of information (e.g., dementia, severe intellectual disability, alcohol intoxication)   Negative: Eyelids swollen shut    "Negative: Large swelling or bruise (>2 inches or 5 cm)   Negative: Doesn't sound like foreign body (FB) in the eye   Negative: Foreign body is a piece of chemical   Negative: Foreign body (FB) stuck on eyeball  (Exception: Contact lens.)   Negative: [1] Sharp FB (even if FB was removed) AND [2] any pain present now   Negative: FB hit eye at high speed (e.g., small metallic chip from hammering, lawnmower, BB gun, explosion)   Negative: [1] Eye has been washed out > 30 minutes ago AND [2] feels like FB is still present   Negative: [1] Eye pain AND [2] persists > 1 hour since irrigation (regardless of duration of flushing)   Negative: [1] Tearing or blinking AND [2] persists > 1 hour since irrigation (regardless of duration of flushing)   Negative: [1] Contact lens stuck in eye AND [2] unable to remove using Care Advice   Negative: Yellow or green pus occurs   Negative: Cloudy spot on the cornea (clear part of the eye)   Negative: Minor foreign body in the eye   Negative: [1] FB removed AND [2] no symptoms   Negative: Removing a hard contact lens, questions about   Negative: Removing a soft contact lens, questions about    Answer Assessment - Initial Assessment Questions  1. MECHANISM: \"How did the injury happen?\"       Sawdust in eye  2. ONSET: \"When did the injury happen?\" (Minutes or hours ago)       Thursday  3. LOCATION: \"What part of the eye is injured?\" (cornea, sclera, eyelid, or periorbital tissue)      Right eye  4. APPEARANCE: \"What does the eye look like?\"       Red irritated and watering  5. VISION: \"Is the vision blurred?\"       Blurred vision  6. PAIN: \"Is it painful?\" If Yes, ask: \"How bad is the pain?\"   (e.g., Scale 1-10; or mild, moderate, severe)      Pain rated 3  7. SIZE: For cuts, bruises, or swelling, ask: \"How large is it?\" (e.g., inches or centimeters)       Scratched inside  8. TETANUS: For any breaks in the skin, ask: \"When was the last tetanus booster?\"      unknown  9. CONTACTS: \"Do you " "wear contacts?\"      Wears glasses  10. OTHER SYMPTOMS: \"Do you have any other symptoms?\" (e.g., headache, neck pain, vomiting)        no  11. PREGNANCY: \"Is there any chance you are pregnant?\" \"When was your last menstrual period?\"        no    Answer Assessment - Initial Assessment Questions  1. TYPE OF FOREIGN BODY: \"What got in the eye?\"       sawdust  2. ONSET: \"When did it happen?\"       Thursday  3. MECHANISM: \"How did it happen?\"       Sawdust working  4. VISION: \"Do you have blurred vision?\"       yes  5. PAIN: \"Is it painful?\" If Yes, ask: \"How bad is the pain?\"  (Scale 1-10; or mild, moderate, severe)      yes  6. CONTACTS: \"Do you wear contacts?\"      no  7. OTHER SYMPTOMS: \"Do you have any other symptoms?\"      Blurred vision, watering hurting  8. PREGNANCY: \"Is there any chance you are pregnant?\" \"When was your last menstrual period?\"      no    Protocols used: Eye Injury-ADULT-, Eye - Foreign Body-ADULT-    "

## 2024-03-17 NOTE — ED PROVIDER NOTES
EMERGENCY DEPARTMENT ENCOUNTER    Room Number:  18/18  PCP: Jak Link MD  History obtained from: Patient      HPI:  Chief Complaint: Foreign body sensation in eye  A complete HPI/ROS/PMH/PSH/SH/FH are unobtainable due to: N/A  Context: Flavio Frost is a 68 y.o. male who presents to the ED c/o foreign body sensation in right eye.  Started after the wind blew some sawdust into his eye.  For like he had a foreign body, try to irrigated out without success.  Without a persistent sensation of foreign body underneath his right upper eyelid since that time.  Also progressive irritation of the right eye and clear drainage.  No other recent illness, fever, chills.  Some light sensitivity although no gross loss of visual acuity.            PAST MEDICAL HISTORY  Active Ambulatory Problems     Diagnosis Date Noted    Acute MI, inferior wall 08/20/2018    BPH (benign prostatic hyperplasia) 10/14/2013    DDD (degenerative disc disease), cervical 10/01/2013    Disequilibrium 12/02/2013    Dyslipidemia 10/14/2013    Hematochezia 11/30/2012    Inguinal hernia 11/30/2012    TIA (transient ischemic attack) 12/02/2013    Vertigo 10/14/2013    Tricuspid regurgitation 08/20/2018    Status post insertion of drug-eluting stent into right coronary artery for coronary artery disease 08/20/2018    Heart palpitations 09/14/2018    Dizziness 09/14/2018    Coronary artery disease 05/10/2019    Hyperlipidemia 05/10/2019    Essential hypertension 05/10/2019    Chronic fatigue 05/10/2019    Chest discomfort 08/27/2022    Medicare annual wellness visit, subsequent 09/01/2022    History of ST elevation myocardial infarction (STEMI) 03/31/2023    Influenza vaccination declined 01/11/2024    COVID-19 vaccine dose declined 01/11/2024    Pneumococcal vaccination declined 01/11/2024     Resolved Ambulatory Problems     Diagnosis Date Noted    Tobacco abuse 10/14/2013     Past Medical History:   Diagnosis Date    Bradycardia     Cancer      Enlarged prostate     Hypertension     Hypotension     Myocardial infarction          PAST SURGICAL HISTORY  Past Surgical History:   Procedure Laterality Date    CARDIAC CATHETERIZATION      CARDIAC CATHETERIZATION N/A 8/20/2018    Procedure: Left Heart Cath;  Surgeon: Giovanny Leyva MD;  Location:  [facility]ATH [facility]VASIVE [facility];  Service: Cardiology    CARDIAC CATHETERIZATION N/A 8/20/2018    Procedure: Stent CARRIE coronary;  Surgeon: Giovanny Leyva MD;  Location:  [facility];  Service: Cardiology    CARDIAC CATHETERIZATION N/A 8/20/2018    Procedure: Coronary angiography;  Surgeon: Giovanny Leyva MD;  Location:  [facility];  Service: Cardiology    CARDIAC CATHETERIZATION N/A 8/20/2018    Procedure: Left ventriculography;  Surgeon: Giovanny Leyva MD;  Location: [facility] CATH INVASIVE LOCATION;  Service: Cardiology    CARDIAC CATHETERIZATION  8/20/2018    Procedure: Percutaneous Mechanical Thrombectomy;  Surgeon: Giovanny Leyva MD;  Location: [facility] CATH INVASIVE LOC[facility];  Service: Cardiology    TN RT/LT HEART CATHETERS N/A 8/20/2018    Procedure: Percutaneous Coronary Intervention;  Surgeon: Giovanny Leyva MD;  Location:  [facility] CATH INVASIVE LOCATION;  Service: Cardiology    SKIN BIOPSY      SKIN CANCER EXCISION  2001    Basal cell carcinoma x 2, malignant melanoma x 1    WRIST SURGERY Left 2007         FAMILY HISTORY  Family History   Problem Relation Age of Onset    Heart attack Mother 60    Heart disease Mother     Hypertension Mother     Diabetes Mother     Heart attack Father 43    Heart disease Father     No Known Problems Daughter     No Known Problems Daughter          SOCIAL HISTORY  Social History     Socioeconomic History    Marital status: Single   Tobacco Use    Smoking status: Former     Current packs/day: 0.00     Types: Electronic Cigarette, Cigarettes     Start date: 1/17/1972     Quit date: 1/17/2018     Years since  quittin.1    Smokeless tobacco: Never    Tobacco comments:     Quit smoking cigarettes 2018, now just uses ECig   Vaping Use    Vaping status: Every Day    Start date: 2018    Substances: Nicotine   Substance and Sexual Activity    Alcohol use: Yes     Comment: Less than 1 drink a week    Drug use: Never    Sexual activity: Not Currently         ALLERGIES  Penicillins and Sertraline        REVIEW OF SYSTEMS    As per HPI      PHYSICAL EXAM  ED Triage Vitals   Temp Heart Rate Resp BP SpO2   24 1434 24 1434 24 1434 24 1436 24 1434   97.6 °F (36.4 °C) 68 18 133/84 98 %      Temp src Heart Rate Source Patient Position BP Location FiO2 (%)   -- -- -- -- --              Physical Exam  Constitutional:       General: He is not in acute distress.  HENT:      Head: Normocephalic and atraumatic.   Eyes:      Comments: Conjunctival injection on the right with clear discharge, fluorescein exam without evidence of corneal abrasion, light sensitivity limits evaluation on slit-lamp   Cardiovascular:      Rate and Rhythm: Normal rate and regular rhythm.   Pulmonary:      Effort: No respiratory distress.   Abdominal:      General: There is no distension.      Tenderness: There is no abdominal tenderness.   Musculoskeletal:         General: No swelling or deformity.   Skin:     General: Skin is warm and dry.   Neurological:      General: No focal deficit present.      Mental Status: He is alert. Mental status is at baseline.           Vital signs and nursing notes reviewed.          LAB RESULTS  No results found for this or any previous visit (from the past 24 hour(s)).    Ordered the above labs and reviewed the results.        RADIOLOGY  No Radiology Exams Resulted Within Past 24 Hours    Ordered the above noted radiological studies. Reviewed by me in PACS.                MEDICATIONS GIVEN IN ER  Medications   fluorescein ophthalmic strip 1 strip (1 strip Both Eyes Given by Other 3/17/24 7420)    tetracaine (ALTACAINE) 0.5 % ophthalmic solution 2 drop (2 drops Right Eye Given by Other 3/17/24 1622)   neomycin-polymyxin-dexamethasone (MAXITROL) 0.1 % ophthalmic suspension 1 drop (1 drop Right Eye Given 3/17/24 1752)               MEDICAL DECISION MAKING, PROGRESS, and CONSULTS    MDM: Patient presented emergency department with right eye pain, erythema.  Otherwise well-appearing, vitals otherwise stable.  Exam mostly reassuring however patient has more light sensitivity than I would typically expect with a corneal injury/foreign body.  Attempted to irrigate after tetracaine however patient still has significant discomfort.  Discussed with ophthalmology, most concerning for allergic conjunctivitis, plan for Maxitrol drops and close follow-up.  Given return precautions and discharged home.    All labs have been independently reviewed by me.  All radiology studies have been reviewed by me and I have also reviewed the radiology report.   EKG's independently viewed and interpreted by me.  Discussion below represents my analysis of pertinent findings related to patient's condition, differential diagnosis, treatment plan and final disposition.      Additional sources:  - Discussed/ obtained information from independent historians:      - External (non-ED) record review:     - Chronic or social conditions impacting care:     - Shared decision making: Discussed plan for discharge with outpatient follow-up, patient agrees      Orders placed during this visit:  Orders Placed This Encounter   Procedures    Ophthalmology (on-call MD unless specified)         Additional orders considered but not ordered:  Considered CT however not concern for intraocular foreign body.        Differential diagnosis includes but is not limited to:    Corneal abrasion, conjunctivitis, uveitis, iritis, episcleritis, scleritis      Independent interpretation of labs, radiology studies, and discussions with consultants:            DIAGNOSIS  Final diagnoses:   Acute allergic conjunctivitis of right eye         DISPOSITION  Discharged home        Latest Documented Vital Signs:  As of 18:27 EDT  BP- 118/85 HR- 68 Temp- 97.6 °F (36.4 °C) O2 sat- 98%              --    Please note that portions of this were completed with a voice recognition program.       Note Disclaimer: At [facility], we believe that sharing information builds trust and better relationships. You are receiving this note because you are receiving care at [facility] or recently visited. It is possible you will see health information before a provider has talked with you about it. This kind of information can be easy to misunderstand. To help you fully understand what it means for your health, we urge you to discuss this note with your provider.             Rambo Ozuna MD  03/17/24 1235

## 2024-03-17 NOTE — ED NOTES
Patient to ER via car from home for saw dust in r eye x last week  Patient's eye is hurting and red

## 2024-03-18 RX ORDER — TAMSULOSIN HYDROCHLORIDE 0.4 MG/1
1 CAPSULE ORAL DAILY
Qty: 90 CAPSULE | Refills: 3 | Status: SHIPPED | OUTPATIENT
Start: 2024-03-18

## 2024-03-18 RX ORDER — ATORVASTATIN CALCIUM 20 MG/1
20 TABLET, FILM COATED ORAL NIGHTLY
Qty: 90 TABLET | Refills: 3 | Status: SHIPPED | OUTPATIENT
Start: 2024-03-18

## 2024-04-18 ENCOUNTER — OFFICE VISIT (OUTPATIENT)
Age: 69
End: 2024-04-18
Payer: MEDICARE

## 2024-04-18 VITALS
WEIGHT: 200 LBS | DIASTOLIC BLOOD PRESSURE: 72 MMHG | BODY MASS INDEX: 27.09 KG/M2 | SYSTOLIC BLOOD PRESSURE: 120 MMHG | HEIGHT: 72 IN | HEART RATE: 63 BPM

## 2024-04-18 DIAGNOSIS — I10 ESSENTIAL HYPERTENSION: ICD-10-CM

## 2024-04-18 DIAGNOSIS — I25.118 CORONARY ARTERY DISEASE OF NATIVE ARTERY OF NATIVE HEART WITH STABLE ANGINA PECTORIS: ICD-10-CM

## 2024-04-18 DIAGNOSIS — Z95.5 STATUS POST INSERTION OF DRUG-ELUTING STENT INTO RIGHT CORONARY ARTERY FOR CORONARY ARTERY DISEASE: ICD-10-CM

## 2024-04-18 DIAGNOSIS — E78.5 DYSLIPIDEMIA: Primary | ICD-10-CM

## 2024-04-18 PROCEDURE — 93000 ELECTROCARDIOGRAM COMPLETE: CPT | Performed by: INTERNAL MEDICINE

## 2024-04-18 PROCEDURE — 3074F SYST BP LT 130 MM HG: CPT | Performed by: INTERNAL MEDICINE

## 2024-04-18 PROCEDURE — 99214 OFFICE O/P EST MOD 30 MIN: CPT | Performed by: INTERNAL MEDICINE

## 2024-04-18 PROCEDURE — 3078F DIAST BP <80 MM HG: CPT | Performed by: INTERNAL MEDICINE

## 2024-04-18 NOTE — PROGRESS NOTES
Date of Office Visit: 24    Encounter Provider: Giovanny Leyva MD  Place of Service: [facility] CARDIOLOGY  Patient Name: Flavio Frost  :1955    Chief Complaint   Patient presents with    Coronary Artery Disease    Follow-up     1 year       HPI: 68 y.o. male with a medical history of dyslipidemia, tobacco abuse and hypertension.  He has coronary artery disease with an inferior MI in 2018 for which he had thrombectomy and CARRIE placement to RCA.  During that intervention, EF was 30%, follow-up echo showed EF 70%.      Patient underwent exercise Cardiolite stress test on 2022 which showed normal myocardial perfusion study with no evidence of ischemia, EF 60%. Echocardiogram on 2022 showed normal left ventricular function and normal LV cavity size and wall thickness.  There is mild calcification of aortic valve with no aortic regurgitation or stenosis.  Mitral valve structurally normal with no regurgitation or stenosis.  Normal RV size and systolic function and normal left and right atrium.      Patient had previous 14-day Holter monitor in 2021 that was relatively benign.  Showed episodes of junctional rhythm.  Palpitations were associated with isolated PACs, PVCs and junctional rhythm.  There were some occasional episodes of dizziness reported.  No evidence of atrial arrhythmias, supraventricular tachycardia or VT.  He also had a relatively benign 48-hour Holter monitor in 2018.        Previous testing and notes have been reviewed by me.   Past Medical History:   Diagnosis Date    BPH (benign prostatic hyperplasia)     Bradycardia     Cancer     skin cancer    Coronary artery disease     Enlarged prostate     Hyperlipidemia     Hypertension     Hypotension     Myocardial infarction        Past Surgical History:   Procedure Laterality Date    CARDIAC CATHETERIZATION      CARDIAC CATHETERIZATION N/A 2018    Procedure: Left Heart Cath;   Surgeon: Giovanny Leyva MD;  Location:  [facility];  Service: Cardiology    CARDIAC CATHETERIZATION N/A 2018    Procedure: Stent CARRIE coronary;  Surgeon: Giovanny Leyva MD;  Location:  [facility];  Service: Cardiology    CARDIAC CATHETERIZATION N/A 2018    Procedure: Coronary angiography;  Surgeon: Giovanny Leyva MD;  Location:  [facility]ATH INVASIVE LOC[facility];  Service: Cardiology    CARDIAC CATHETERIZATION N/A 2018    Procedure: Left ventriculography;  Surgeon: Giovanny Leyva MD;  Location:  [facility]ATH INVAS[facility] LOC[facility];  Service: Cardiology    CARDIAC CATHETERIZATION  2018    Procedure: Percutaneous Mechanical Thrombectomy;  Surgeon: Giovanny Leyva MD;  Location:  [facility] INVAS[facility];  Service: Cardiology    MI RT/LT HEART CATHETERS N/A 2018    Procedure: Percutaneous Coronary Intervention;  Surgeon: Giovanny Leyva MD;  Location:  [facility];  Service: Cardiology    SKIN BIOPSY      SKIN CANCER EXCISION  2001    Basal cell carcinoma x 2, malignant melanoma x 1    WRIST SURGERY Left        Social History     Socioeconomic History    Marital status: Single   Tobacco Use    Smoking status: Former     Current packs/day: 0.00     Types: Cigarettes, Electronic Cigarette     Start date: 1972     Quit date: 2018     Years since quittin.2    Smokeless tobacco: Never    Tobacco comments:     Quit smoking cigarettes 2018, now just uses ECig   Vaping Use    Vaping status: Every Day    Start date: 2018    Substances: Nicotine   Substance and Sexual Activity    Alcohol use: Yes     Comment: Less than 1 drink a week    Drug use: Never    Sexual activity: Not Currently       Family History   Problem Relation Age of Onset    Heart attack Mother 60    Heart disease Mother     Hypertension Mother     Diabetes Mother     Heart attack Father 43    Heart disease Father     No Known  Problems Daughter     No Known Problems Daughter        Review of Systems   Constitutional: Negative. Negative for fever and malaise/fatigue.   HENT: Negative.  Negative for nosebleeds and sore throat.    Eyes: Negative.  Negative for blurred vision and double vision.   Cardiovascular: Negative.  Negative for chest pain, claudication, palpitations and syncope.   Respiratory: Negative.  Negative for cough, shortness of breath and snoring.    Endocrine: Negative.  Negative for cold intolerance, heat intolerance and polydipsia.   Hematologic/Lymphatic: Negative.    Skin: Negative.  Negative for itching, poor wound healing and rash.   Musculoskeletal: Negative.  Negative for joint pain, joint swelling, muscle weakness and myalgias.   Gastrointestinal: Negative.  Negative for abdominal pain, melena, nausea and vomiting.   Genitourinary: Negative.    Neurological: Negative.  Negative for light-headedness, loss of balance, seizures, vertigo and weakness.   Psychiatric/Behavioral: Negative.  Negative for altered mental status and depression.    Allergic/Immunologic: Negative.        Allergies   Allergen Reactions    Penicillins Hives    Sertraline Anxiety     ?halllucinations         Current Outpatient Medications:     aspirin 81 MG chewable tablet, Chew 1 tablet Daily., Disp: 30 tablet, Rfl: 11    atorvastatin (LIPITOR) 20 MG tablet, TAKE 1 TABLET BY MOUTH EVERY NIGHT, Disp: 90 tablet, Rfl: 3    lisinopril (PRINIVIL,ZESTRIL) 10 MG tablet, Take 1 tablet by mouth Daily., Disp: 90 tablet, Rfl: 3    nitroglycerin (NITROSTAT) 0.4 MG SL tablet, 1 under the tongue as needed for angina, may repeat q5mins for up three doses (Patient taking differently: 1 under the tongue as needed for angina, may repeat q5mins for up three doses  Has them in his pocket, however he dose not generally use them), Disp: 45 tablet, Rfl: 11    tamsulosin (FLOMAX) 0.4 MG capsule 24 hr capsule, TAKE 1 CAPSULE BY MOUTH DAILY, Disp: 90 capsule, Rfl: 3     "  Objective:     Vitals:    04/18/24 1338   BP: 120/72   Pulse: 63   Weight: 90.7 kg (200 lb)   Height: 182.9 cm (72\")     Body mass index is 27.12 kg/m².       PHYSICAL EXAM:    Constitutional:       Appearance: Healthy appearance. Not in distress.   Neck:      Vascular: No JVR. JVD normal.   Pulmonary:      Effort: Pulmonary effort is normal.      Breath sounds: Normal breath sounds. No wheezing. No rhonchi. No rales.   Chest:      Chest wall: Not tender to palpatation.   Cardiovascular:      PMI at left midclavicular line. Normal rate. Regular rhythm. Normal S1. Normal S2.       Murmurs: There is no murmur.      No gallop.  No click. No rub.   Pulses:     Intact distal pulses.   Edema:     Peripheral edema absent.   Abdominal:      General: Bowel sounds are normal.      Palpations: Abdomen is soft.      Tenderness: There is no abdominal tenderness.   Musculoskeletal: Normal range of motion.         General: No tenderness. Skin:     General: Skin is warm and dry.   Neurological:      General: No focal deficit present.      Mental Status: Alert and oriented to person, place and time.           ECG 12 Lead    Date/Time: 4/18/2024 1:56 PM  Performed by: Giovanny Leyva MD    Authorized by: Giovanny Leyva MD  Comparison: compared with previous ECG from 3/31/2023  Similar to previous ECG  Rhythm: sinus rhythm  Rate: normal  QRS axis: right          2D echocardiogram 9/30/2022:  Calculated left ventricular EF = 63.5% Estimated left ventricular EF = 64% Estimated left ventricular EF was in agreement with the calculated left ventricular EF. Left ventricular systolic function is normal. Normal left ventricular cavity size and wall thickness noted. All left ventricular wall segments contract normally. Left ventricular diastolic function was normal.  No aortic valve regurgitation or stenosis is present. The aortic valve is abnormal in structure. There is mild calcification of the aortic valve.    Cardiolite " exercise stress test 8/28/2022:  Findings consistent with a normal ECG stress test.  Left ventricular ejection fraction is normal. (Calculated EF = 60%).  Myocardial perfusion imaging indicates a normal myocardial perfusion study with no evidence of ischemia.  Impressions are consistent with a low risk study.        Assessment:     Plan:       1.  Coronary artery disease: History of inferior MI in 2018 status post thrombectomy and CARRIE to RCA.  No beta-blocker as patient with history of junctional rhythm.  Stable EKG. No angina  -Continue aspirin 81 mg p.o. daily Lipitor 40 mg p.o. nightly.   - Denies angina    2.  Hypertension: Well-controlled.      3.  Hyperlipidemia: LDL 67 in January.  Well-controlled.  Continue atorvastatin current dose.  No elevation in transaminases.    4.  H/o tobacco abuse: Stopped smoking after MI in 2018.  Stopped vaping as well    5.  Junctional rhythm: Intermittent.  Not on beta-blockers           Your medication list            Accurate as of April 18, 2024  2:09 PM. If you have any questions, ask your nurse or doctor.                CHANGE how you take these medications        Instructions Last Dose Given Next Dose Due   nitroglycerin 0.4 MG SL tablet  Commonly known as: NITROSTAT  What changed: additional instructions      1 under the tongue as needed for angina, may repeat q5mins for up three doses              CONTINUE taking these medications        Instructions Last Dose Given Next Dose Due   aspirin 81 MG chewable tablet      Chew 1 tablet Daily.       atorvastatin 20 MG tablet  Commonly known as: LIPITOR      TAKE 1 TABLET BY MOUTH EVERY NIGHT       lisinopril 10 MG tablet  Commonly known as: PRINIVIL,ZESTRIL      Take 1 tablet by mouth Daily.       tamsulosin 0.4 MG capsule 24 hr capsule  Commonly known as: FLOMAX      TAKE 1 CAPSULE BY MOUTH DAILY

## 2024-05-24 ENCOUNTER — TELEPHONE (OUTPATIENT)
Dept: CARDIOLOGY | Facility: CLINIC | Age: 69
End: 2024-05-24
Payer: MEDICARE

## 2024-05-24 NOTE — TELEPHONE ENCOUNTER
Reviewed recommendations with patient, verbalized understanding, will call with any further questions or complaints.  Pt states that he called PCP, and they can't see him until June.  He states that he is going to try to find another PCP.    Emerita Mejia RN  Triage Nurse  05/24/24 12:11 EDT

## 2024-05-24 NOTE — TELEPHONE ENCOUNTER
"Pt called in to triage.  Pt states that since Wednesday evening, he has really not been feeling well.  He states that he had an episode that evening where he felt very dizzy, like he could hardly sit up, he felt nauseous, and like the room was spinning, particularly when he was laying on his left side.  Pt wonders if this could be positional vertigo.  He also states that he had similar symptoms years ago, and he was seen in the ED, and felt much better after ear wax was removed.  Pt states that currently he is slightly dizzy, lightheaded, jittery like he's had too many cups of coffee, like his heart isn't beating correctly, SOA all the time (states that for a while he has been experiencing BOX off and on) and like he has to \"catch his breath\".  Pt also states that he feels like his peripheral vision is blurry, \"almost, but not quite like tunnel vision\".  He has off and on HA lasting 1-2 min at a time.  Pt denies any CP, swelling, weakness in one side vs the other, just a generalized malaise and weakness \"I just don't feel right\".  Current HR/BP 64, 146/102.    I recommended to pt that if symptoms worsen, or if he has another episode like Wednesday, he should be evaluated in the ER.  Pt states that he doesn't feel like it is an emergency at this time, and would like to know what you think he should do.  Recommendations?    Thanks so much,  Emerita Mejia, RN  Triage RN  05/24/24 11:57 EDT    "

## 2024-08-01 ENCOUNTER — TELEPHONE (OUTPATIENT)
Dept: FAMILY MEDICINE CLINIC | Facility: CLINIC | Age: 69
End: 2024-08-01
Payer: MEDICARE

## 2024-08-01 NOTE — TELEPHONE ENCOUNTER
OKAY FOR HUB TO RELAY    I tried calling and speaking with the patient but did not get an answer. I left a brief message letting him know I was calling over a cologuard order that was placed for him and for him to call back. If he calls back:    Dr Link placed an order for you to do a cologuard. We were just wondering if this has been done yet or if you even planned on getting this done? Please just let us know.

## 2024-08-30 NOTE — TELEPHONE ENCOUNTER
"Caller: Flavio Frost \"Dash Frost\"    Relationship: Self    Best call back number: [phone]    Requested Prescriptions:   Requested Prescriptions     Pending Prescriptions Disp Refills    lisinopril (PRINIVIL,ZESTRIL) 10 MG tablet 90 tablet 3     Sig: Take 1 tablet by mouth Daily.        Pharmacy where request should be sent: [id number] - [facility] [address] [address] AT [place] [address] & [place] [address] - [phone] [facility] [phone]      Last office visit with prescribing clinician: Visit date not found   Last telemedicine visit with prescribing clinician: Visit date not found   Next office visit with prescribing clinician: Visit date not found         Does the patient have less than a 3 day supply:  [] Yes  [x] No    Would you like a call back once the refill request has been completed: [] Yes [x] No    If the office needs to give you a call back, can they leave a voicemail: [] Yes [x] No    Farzaneh Douglas Rep   08/30/24 10:03 EDT       "

## 2024-09-03 RX ORDER — LISINOPRIL 10 MG/1
10 TABLET ORAL DAILY
Qty: 90 TABLET | Refills: 3 | Status: SHIPPED | OUTPATIENT
Start: 2024-09-03

## 2024-09-23 RX ORDER — LISINOPRIL 10 MG/1
10 TABLET ORAL DAILY
Qty: 90 TABLET | Refills: 3 | Status: SHIPPED | OUTPATIENT
Start: 2024-09-23

## 2024-09-25 ENCOUNTER — OFFICE VISIT (OUTPATIENT)
Dept: CARDIOLOGY | Facility: CLINIC | Age: 69
End: 2024-09-25
Payer: MEDICARE

## 2024-09-25 VITALS
DIASTOLIC BLOOD PRESSURE: 78 MMHG | HEIGHT: 72 IN | HEART RATE: 53 BPM | BODY MASS INDEX: 25.63 KG/M2 | SYSTOLIC BLOOD PRESSURE: 132 MMHG | WEIGHT: 189.2 LBS

## 2024-09-25 DIAGNOSIS — I25.2 HISTORY OF ST ELEVATION MYOCARDIAL INFARCTION (STEMI): ICD-10-CM

## 2024-09-25 DIAGNOSIS — I25.118 CORONARY ARTERY DISEASE OF NATIVE ARTERY OF NATIVE HEART WITH STABLE ANGINA PECTORIS: ICD-10-CM

## 2024-09-25 DIAGNOSIS — E78.5 HYPERLIPIDEMIA, UNSPECIFIED HYPERLIPIDEMIA TYPE: ICD-10-CM

## 2024-09-25 DIAGNOSIS — I10 ESSENTIAL HYPERTENSION: ICD-10-CM

## 2024-09-25 DIAGNOSIS — Z95.5 STATUS POST INSERTION OF DRUG-ELUTING STENT INTO RIGHT CORONARY ARTERY FOR CORONARY ARTERY DISEASE: Primary | ICD-10-CM

## 2024-09-25 PROCEDURE — 99214 OFFICE O/P EST MOD 30 MIN: CPT | Performed by: NURSE PRACTITIONER

## 2024-09-25 PROCEDURE — 93000 ELECTROCARDIOGRAM COMPLETE: CPT | Performed by: NURSE PRACTITIONER

## 2024-09-25 PROCEDURE — 3078F DIAST BP <80 MM HG: CPT | Performed by: NURSE PRACTITIONER

## 2024-09-25 PROCEDURE — 3075F SYST BP GE 130 - 139MM HG: CPT | Performed by: NURSE PRACTITIONER

## 2024-12-04 ENCOUNTER — OFFICE VISIT (OUTPATIENT)
Dept: FAMILY MEDICINE CLINIC | Facility: CLINIC | Age: 69
End: 2024-12-04
Payer: MEDICARE

## 2024-12-04 VITALS
TEMPERATURE: 98 F | WEIGHT: 189.6 LBS | SYSTOLIC BLOOD PRESSURE: 130 MMHG | OXYGEN SATURATION: 96 % | DIASTOLIC BLOOD PRESSURE: 84 MMHG | HEART RATE: 67 BPM | BODY MASS INDEX: 25.68 KG/M2 | HEIGHT: 72 IN

## 2024-12-04 DIAGNOSIS — J01.90 ACUTE NON-RECURRENT SINUSITIS, UNSPECIFIED LOCATION: ICD-10-CM

## 2024-12-04 DIAGNOSIS — J20.9 ACUTE BRONCHITIS, UNSPECIFIED ORGANISM: Primary | ICD-10-CM

## 2024-12-04 DIAGNOSIS — R05.1 ACUTE COUGH: ICD-10-CM

## 2024-12-04 LAB
EXPIRATION DATE: NORMAL
FLUAV AG UPPER RESP QL IA.RAPID: NOT DETECTED
FLUBV AG UPPER RESP QL IA.RAPID: NOT DETECTED
INTERNAL CONTROL: NORMAL
Lab: NORMAL
SARS-COV-2 AG UPPER RESP QL IA.RAPID: NOT DETECTED

## 2024-12-04 PROCEDURE — 3075F SYST BP GE 130 - 139MM HG: CPT | Performed by: NURSE PRACTITIONER

## 2024-12-04 PROCEDURE — 99213 OFFICE O/P EST LOW 20 MIN: CPT | Performed by: NURSE PRACTITIONER

## 2024-12-04 PROCEDURE — 87428 SARSCOV & INF VIR A&B AG IA: CPT | Performed by: NURSE PRACTITIONER

## 2024-12-04 PROCEDURE — 1160F RVW MEDS BY RX/DR IN RCRD: CPT | Performed by: NURSE PRACTITIONER

## 2024-12-04 PROCEDURE — 3079F DIAST BP 80-89 MM HG: CPT | Performed by: NURSE PRACTITIONER

## 2024-12-04 PROCEDURE — 1159F MED LIST DOCD IN RCRD: CPT | Performed by: NURSE PRACTITIONER

## 2024-12-04 RX ORDER — AZITHROMYCIN 250 MG/1
TABLET, FILM COATED ORAL
Qty: 6 TABLET | Refills: 0 | Status: SHIPPED | OUTPATIENT
Start: 2024-12-04

## 2024-12-04 NOTE — PATIENT INSTRUCTIONS
Increase intake of clear liquids and rest.  Try plain Mucinex to thin secretions.  May try over the nasal steroid, such as Flonase or Nasacort.  May try over the counter Delsym as needed for cough.  Follow up if symptoms persist or worsen.

## 2024-12-04 NOTE — ASSESSMENT & PLAN NOTE
Increase intake of clear liquids and rest.  Try plain Mucinex to thin secretions.  May try over the counter Delsym as needed for cough.

## 2024-12-04 NOTE — PROGRESS NOTES
Subjective   Flavio Frost is a 69 y.o. male.     Chief Complaint   Patient presents with    URI     Starting after thanksgiving he started feeling sick. He had a headache and nasal congestion. He had chills as well. It has now moved into his chest.    Cough       History of Present Illness   Patient of Dr. Link and is new to me; patient presents with c/o headache and nasal congestion; started feeling bad on 11/29/24; felt feverish with chills for half day on 11/30/24, but resolved; has had cough and chest congestion as well; starting to feel some better at this point; has had some productive cough--light cream color; will cough if takes deep breath; some SOA with activity, no change; had follow up with cardiology due to some SOA recently; cardiology referred to pulmonary and had negative work up; had negative PFTs; stopped smoking after MI in past; had sore throat x1 day, then resolved; no ear pain; mild nausea, no vomiting or diarrhea; no OTC treatment; had sinus symptoms about 1.5 months ago, but resolved after 2 weeks.      The following portions of the patient's history were reviewed and updated as appropriate: allergies, current medications, past family history, past medical history, past social history, past surgical history and problem list.    Current Outpatient Medications on File Prior to Visit   Medication Sig    aspirin 81 MG chewable tablet Chew 1 tablet Daily.    atorvastatin (LIPITOR) 20 MG tablet TAKE 1 TABLET BY MOUTH EVERY NIGHT    lisinopril (PRINIVIL,ZESTRIL) 10 MG tablet TAKE 1 TABLET BY MOUTH DAILY    nitroglycerin (NITROSTAT) 0.4 MG SL tablet 1 under the tongue as needed for angina, may repeat q5mins for up three doses (Patient taking differently: 1 under the tongue as needed for angina, may repeat q5mins for up three doses    Has them in his pocket, however he dose not generally use them)    tamsulosin (FLOMAX) 0.4 MG capsule 24 hr capsule TAKE 1 CAPSULE BY MOUTH DAILY     No current  facility-administered medications on file prior to visit.       Past Medical History:   Diagnosis Date    BPH (benign prostatic hyperplasia)     Bradycardia     Cancer     skin cancer    Coronary artery disease     Enlarged prostate     Hyperlipidemia     Hypertension     Hypotension     Myocardial infarction        Past Surgical History:   Procedure Laterality Date    CARDIAC CATHETERIZATION      CARDIAC CATHETERIZATION N/A 8/20/2018    Procedure: Left Heart Cath;  Surgeon: Giovanny Leyva MD;  Location:  [facility];  Service: Cardiology    CARDIAC CATHETERIZATION N/A 8/20/2018    Procedure: Stent CARRIE coronary;  Surgeon: Giovanny Leyva MD;  Location:  [facility];  Service: Cardiology    CARDIAC CATHETERIZATION N/A 8/20/2018    Procedure: Coronary angiography;  Surgeon: Giovanny Leyva MD;  Location:  [facility];  Service: Cardiology    CARDIAC CATHETERIZATION N/A 8/20/2018    Procedure: Left ventriculography;  Surgeon: Giovanny Leyva MD;  Location:  [facility];  Service: Cardiology    CARDIAC CATHETERIZATION  8/20/2018    Procedure: Percutaneous Mechanical Thrombectomy;  Surgeon: Giovanny Leyva MD;  Location:  [facility];  Service: Cardiology    AR RT/LT HEART CATHETERS N/A 8/20/2018    Procedure: Percutaneous Coronary Intervention;  Surgeon: Giovanny Leyva MD;  Location:  [facility];  Service: Cardiology    SKIN BIOPSY      SKIN CANCER EXCISION  2001    Basal cell carcinoma x 2, malignant melanoma x 1    WRIST SURGERY Left 2007       Family History   Problem Relation Age of Onset    Heart attack Mother 60    Heart disease Mother     Hypertension Mother     Diabetes Mother     Heart attack Father 43    Heart disease Father     No Known Problems Daughter     No Known Problems Daughter        Social History     Socioeconomic History    Marital status: Single  "  Tobacco Use    Smoking status: Former     Current packs/day: 0.00     Types: Cigarettes, Electronic Cigarette     Start date: 1972     Quit date: 2018     Years since quittin.8     Passive exposure: Current    Smokeless tobacco: Never    Tobacco comments:     Quit smoking cigarettes 2018, now just uses E-Cig   24 no smoking e-cigarette    Vaping Use    Vaping status: Former    Start date: 2018    Quit date: 2020    Substances: Nicotine   Substance and Sexual Activity    Alcohol use: Yes     Comment: Less than 1 drink a week    Drug use: Never    Sexual activity: Not Currently       Review of Systems   Constitutional:  Positive for fatigue. Negative for appetite change, fever (see HPI), unexpected weight gain and unexpected weight loss.   HENT:  Positive for postnasal drip (has improved). Negative for sinus pressure and trouble swallowing. Rhinorrhea: runny/stuffy nose.   Eyes:  Negative for blurred vision and discharge.   Respiratory:  Positive for cough. Negative for chest tightness. Shortness of breath: see HPI.   Cardiovascular:  Negative for chest pain, palpitations and leg swelling.   Gastrointestinal:  Negative for abdominal pain.   Musculoskeletal:  Negative for back pain.   Skin:  Negative for rash.   Neurological:  Negative for headache. Light-headedness: mild.  Psychiatric/Behavioral:  Sleep disturbance: currently staying with daughter and her kids and has not been sleeping well.        Objective   Vitals:    24 1307   BP: 130/84   BP Location: Left arm   Patient Position: Sitting   Cuff Size: Large Adult   Pulse: 67   Temp: 98 °F (36.7 °C)   TempSrc: Temporal   SpO2: 96%   Weight: 86 kg (189 lb 9.6 oz)   Height: 182.9 cm (72.01\")     Body mass index is 25.71 kg/m².    Physical Exam  Vitals and nursing note reviewed.   Constitutional:       General: He is not in acute distress.     Appearance: He is well-developed and well-groomed. He is not diaphoretic.   HENT:      " Head: Normocephalic.      Right Ear: External ear normal. No decreased hearing noted. Impacted cerumen: cerumen partially blocking TM. Tympanic membrane is not erythematous.      Left Ear: External ear normal. No decreased hearing noted. Left ear middle ear effusion: TM dull. Tympanic membrane is not erythematous.      Nose: Nose normal.      Right Sinus: No maxillary sinus tenderness or frontal sinus tenderness.      Left Sinus: No maxillary sinus tenderness or frontal sinus tenderness.      Mouth/Throat:      Mouth: Mucous membranes are moist.      Pharynx: No oropharyngeal exudate. Posterior oropharyngeal erythema: mild.  Eyes:      Conjunctiva/sclera: Conjunctivae normal.   Neck:      Vascular: No carotid bruit.   Cardiovascular:      Rate and Rhythm: Normal rate and regular rhythm.      Pulses: Normal pulses.      Heart sounds: Normal heart sounds. No murmur heard.  Pulmonary:      Effort: Pulmonary effort is normal. No respiratory distress.      Breath sounds: Examination of the right-lower field reveals rhonchi. Examination of the left-lower field reveals rhonchi. Rhonchi (some improvement with cough) present. No decreased breath sounds. Wheezes: rare.  Abdominal:      General: Bowel sounds are normal.      Palpations: Abdomen is soft. There is no hepatomegaly or splenomegaly.      Tenderness: There is no abdominal tenderness. There is no guarding.   Musculoskeletal:      Cervical back: Normal range of motion and neck supple.      Right lower leg: No edema.      Left lower leg: No edema.   Lymphadenopathy:      Cervical: No cervical adenopathy.   Skin:     General: Skin is warm and dry.      Findings: No rash.   Neurological:      Mental Status: He is alert and oriented to person, place, and time.      Gait: Gait normal.   Psychiatric:         Mood and Affect: Mood normal.         Behavior: Behavior normal.         Thought Content: Thought content normal.         Cognition and Memory: Cognition normal.          Judgment: Judgment normal.         Lab Results   Component Value Date    WBC 6.20 08/28/2022    RBC 4.59 08/28/2022    HGB 13.3 08/28/2022    HCT 40.4 08/28/2022    MCV 88.0 08/28/2022    MCH 29.0 08/28/2022    MCHC 32.9 08/28/2022    RDW 13.6 08/28/2022    RDWSD 43.2 08/28/2022    MPV 10.5 08/28/2022     08/28/2022    NEUTRORELPCT 56.3 08/27/2022    LYMPHORELPCT 34.6 08/27/2022    MONORELPCT 7.4 08/27/2022    EOSRELPCT 1.0 08/27/2022    BASORELPCT 0.4 08/27/2022    AUTOIGPER 0.3 08/27/2022    NEUTROABS 4.32 08/27/2022    LYMPHSABS 2.66 08/27/2022    MONOSABS 0.57 08/27/2022    EOSABS 0.08 08/27/2022    BASOSABS 0.03 08/27/2022    AUTOIGNUM 0.02 08/27/2022    NRBC 0.0 08/27/2022     Lab Results   Component Value Date    GLUCOSE 91 01/11/2024    BUN 9 01/11/2024    CREATININE 0.98 01/11/2024    EGFRIFNONA 84 01/17/2022    EGFRIFAFRI 97 01/17/2022    BCR 9 (L) 01/11/2024    K 5.0 01/11/2024    CO2 25 01/11/2024    CALCIUM 9.6 01/11/2024    PROTENTOTREF 6.8 01/11/2024    ALBUMIN 4.4 01/11/2024    LABIL2 1.8 01/11/2024    AST 19 01/11/2024    ALT 17 01/11/2024      Lab Results   Component Value Date    CHLPL 125 01/11/2024    TRIG 103 01/11/2024    HDL 39 (L) 01/11/2024    VLDL 19 01/11/2024    LDL 67 01/11/2024     Lab Results   Component Value Date    TSH 0.901 08/03/2021     Lab Results   Component Value Date    HGBA1C 5.60 08/28/2022     Lab Results   Component Value Date    PSA 1.4 01/11/2024     Lab Results   Component Value Date    COLORU Yellow 08/03/2021    CLARITYU Clear 08/03/2021    LEUKOCYTESUR Negative 08/03/2021    GLUCOSEU Negative 08/03/2021    BLOODU Negative 08/03/2021    BILIRUBINUR Negative 08/03/2021    NITRITEU Negative 08/03/2021          Assessment    Problem List Items Addressed This Visit       Acute bronchitis - Primary    Current Assessment & Plan     Increase intake of clear liquids and rest.  Try plain Mucinex to thin secretions.  May try over the counter Delsym as needed for  cough.         Relevant Medications    azithromycin (Zithromax Z-Jordan) 250 MG tablet    Acute non-recurrent sinusitis    Current Assessment & Plan     Increase intake of clear liquids and rest.  Try plain Mucinex to thin secretions.  May try over the nasal steroid, such as Flonase or Nasacort.  May try over the counter Delsym as needed for cough.         Relevant Medications    azithromycin (Zithromax Z-Jordan) 250 MG tablet     Other Visit Diagnoses       Acute cough        Relevant Orders    POCT SARS-CoV-2 + Flu Antigen GAGANDEEP (Completed)             Return if symptoms worsen or fail to improve.  Negative for COVID-19 and flu today; symptoms started 6 days ago and has congestion in lungs; will send Rx for Z-jordan; will consider chest x-ray if symptoms persist.         COVID-19 Precautions - Patient was compliant in wearing a mask. When I saw the patient, I used appropriate personal protective equipment (PPE) including N95 mask, gloves, and eye shield (standard procedure).  Hand hygiene was completed before and after seeing the patient.

## 2024-12-04 NOTE — ASSESSMENT & PLAN NOTE
Increase intake of clear liquids and rest.  Try plain Mucinex to thin secretions.  May try over the nasal steroid, such as Flonase or Nasacort.  May try over the counter Delsym as needed for cough.

## 2025-03-21 ENCOUNTER — TELEPHONE (OUTPATIENT)
Dept: CARDIOLOGY | Age: 70
End: 2025-03-21
Payer: MEDICARE

## 2025-03-24 DIAGNOSIS — N40.1 BENIGN PROSTATIC HYPERPLASIA WITH LOWER URINARY TRACT SYMPTOMS, SYMPTOM DETAILS UNSPECIFIED: ICD-10-CM

## 2025-03-24 RX ORDER — TAMSULOSIN HYDROCHLORIDE 0.4 MG/1
1 CAPSULE ORAL DAILY
Qty: 90 CAPSULE | Refills: 0 | Status: SHIPPED | OUTPATIENT
Start: 2025-03-24

## 2025-03-24 NOTE — TELEPHONE ENCOUNTER
PATIENT CALLED FOR MEDICATION REFILL OF    tamsulosin (FLOMAX) 0.4 MG capsule 24 hr capsule   HE HAS A WEEK LEFT    [id number] - [facility] [id number] [address] [address] [place] [address] & [facility] [address] - [phone] [facility] [phone]  [phone]     CALL BACK NUMBER [phone]

## 2025-03-25 RX ORDER — ATORVASTATIN CALCIUM 20 MG/1
20 TABLET, FILM COATED ORAL NIGHTLY
Qty: 90 TABLET | Refills: 1 | Status: SHIPPED | OUTPATIENT
Start: 2025-03-25

## 2025-03-25 NOTE — TELEPHONE ENCOUNTER
Protocol Failed.    Last set of labs 1/11/2024    NOV 4/22/2025 (KN)    LOV 9/25/2024 (KN)    Plan:       1.  History of inferior STEMI  2.  Coronary artery disease: Status post aspiration thrombectomy and PCI with CARRIE placement from proximal to mid RCA 2018.  EF 30%--> 70% post intervention.  No beta-blocker as patient has history of junctional rhythm.  No angina.  Normal EKG.  3.  History of tobacco abuse: Stopped smoking after MI in 2018   4.  Hyperlipidemia goal LDL less than 70: Lipid panel in target range except HDL on statin therapy.  5.  Hypertension: controlled.  Will monitor BP at home.     Patient has a follow up apptmt schedule with me

## 2025-04-21 NOTE — PROGRESS NOTES
[facility] Follow Up Office Note     Encounter Date:25  Patient:Flavio Frost  :1955  MRN:[id number]      Chief Complaint:   Chief Complaint   Patient presents with    Follow-up         History of Presenting Illness:        Flavio Frost is a 69 y.o. male  that follows with Dr. Leyva and is new to me today. They have a medical history of dyslipidemia, tobacco abuse, hypertension, coronary artery disease.. They are here today for 6-month follow-up.      He had coronary artery disease with an inferior MI in 2018 for which he had thrombectomy and CARRIE placement to RCA. During that intervention, EF was 30%, follow-up echo showed EF 70%.     Patient underwent exercise Cardiolite stress test on 2022 which showed normal myocardial perfusion study with no evidence of ischemia, EF 60%. Echocardiogram on 2022 showed normal left ventricular function and normal LV cavity size and wall thickness.  There is mild calcification of aortic valve with no aortic regurgitation or stenosis.  Mitral valve structurally normal with no regurgitation or stenosis.  Normal RV size and systolic function and normal left and right atrium.       Patient had previous 14-day Holter monitor in 2021 that was relatively benign.  Showed episodes of junctional rhythm.  Palpitations were associated with isolated PACs, PVCs and junctional rhythm.  There were some occasional episodes of dizziness reported.  No evidence of atrial arrhythmias, supraventricular tachycardia or VT.  He also had a relatively benign 48-hour Holter monitor in .    Patient reports that he has been doing well since his last follow-up appointment.  He denies chest pain and peripheral edema.  He reports some shortness of breath that is about the same as previous.  Additionally reports similar palpitations.  I saw him in the office last week his heart rate additionally reports occasional dizziness and fatigue.  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  Unfortunately patient has started smoking cigars after stopping cigarette smoking in 2018.  Heart rate and blood pressure well-controlled.  Patient reports that he does not check his blood pressure is much as he should at home.    Review of Systems:  Review of Systems   Constitutional: Negative. Positive for malaise/fatigue.   HENT: Negative.     Eyes: Negative.    Cardiovascular:  Positive for dyspnea on exertion and palpitations. Negative for chest pain, leg swelling, orthopnea and syncope.   Respiratory:  Negative for cough, shortness of breath and snoring.    Hematologic/Lymphatic: Negative.    Skin: Negative.    Musculoskeletal: Negative.    Gastrointestinal: Negative.    Genitourinary: Negative.    Neurological:  Positive for dizziness and light-headedness. Negative for headaches.   Psychiatric/Behavioral: Negative.         Current Outpatient Medications on File Prior to Visit   Medication Sig Dispense Refill    aspirin 81 MG chewable tablet Chew 1 tablet Daily. 30 tablet 11    atorvastatin (LIPITOR) 20 MG tablet TAKE 1 TABLET BY MOUTH EVERY NIGHT 90 tablet 1    azithromycin (Zithromax Z-Jordan) 250 MG tablet Take 2 tablets the first day, then 1 tablet daily for 4 days. 6 tablet 0    lisinopril (PRINIVIL,ZESTRIL) 10 MG tablet TAKE 1 TABLET BY MOUTH DAILY 90 tablet 3    tamsulosin (FLOMAX) 0.4 MG capsule 24 hr capsule Take 1 capsule by mouth Daily. 90 capsule 0    [DISCONTINUED] nitroglycerin (NITROSTAT) 0.4 MG SL tablet 1 under the tongue as needed for angina, may repeat q5mins for up three doses (Patient taking differently: 1 under the tongue as needed for angina, may repeat q5mins for up three doses    Has them in his pocket, however he dose not generally use them) 45 tablet 11     No current facility-administered medications on file prior to visit.       Allergies   Allergen Reactions    Penicillins Hives    Sertraline Anxiety      ?halllucinations       Past Medical History:   Diagnosis Date    BPH (benign prostatic hyperplasia)     Bradycardia     Cancer     skin cancer    Coronary artery disease     Enlarged prostate     Hyperlipidemia     Hypertension     Hypotension     Myocardial infarction        Past Surgical History:   Procedure Laterality Date    CARDIAC CATHETERIZATION      CARDIAC CATHETERIZATION N/A 2018    Procedure: Left Heart Cath;  Surgeon: Giovanny Leyva MD;  Location:  [facility];  Service: Cardiology    CARDIAC CATHETERIZATION N/A 2018    Procedure: Stent CARRIE coronary;  Surgeon: Giovanny Leyva MD;  Location:  [facility];  Service: Cardiology    CARDIAC CATHETERIZATION N/A 2018    Procedure: Coronary angiography;  Surgeon: Giovanny Leyva MD;  Location:  [facility];  Service: Cardiology    CARDIAC CATHETERIZATION N/A 2018    Procedure: Left ventriculography;  Surgeon: Giovanny Leyva MD;  Location:  [facility];  Service: Cardiology    CARDIAC CATHETERIZATION  2018    Procedure: Percutaneous Mechanical Thrombectomy;  Surgeon: Giovanny Leyva MD;  Location:  [facility];  Service: Cardiology    DC RT/LT HEART CATHETERS N/A 2018    Procedure: Percutaneous Coronary Intervention;  Surgeon: Giovanny Leyva MD;  Location:  [facility];  Service: Cardiology    SKIN BIOPSY      SKIN CANCER EXCISION      Basal cell carcinoma x 2, malignant melanoma x 1    WRIST SURGERY Left 2007       Social History     Socioeconomic History    Marital status: Single   Tobacco Use    Smoking status: Former     Current packs/day: 0.00     Types: Cigarettes, Electronic Cigarette     Start date: 1972     Quit date: 2018     Years since quittin.2     Passive exposure: Current    Smokeless tobacco: Never    Tobacco comments:     Quit smoking cigarettes  "8/2018, now just uses E-Cig   9/25/24 no smoking e-cigarette    Vaping Use    Vaping status: Former    Start date: 8/1/2018    Quit date: 2/1/2020   Substance and Sexual Activity    Alcohol use: Yes     Comment: Less than 1 drink a week    Drug use: Never    Sexual activity: Not Currently       Family History   Problem Relation Age of Onset    Heart attack Mother 60    Heart disease Mother     Hypertension Mother     Diabetes Mother     Heart attack Father 43    Heart disease Father     No Known Problems Daughter     No Known Problems Daughter        The following portions of the patient's history were reviewed and updated as appropriate: allergies, current medications, past family history, past medical history, past social history, past surgical history and problem list.       Objective:       Vitals:    04/22/25 1125   BP: 112/80   BP Location: Left arm   Patient Position: Sitting   Cuff Size: Adult   Pulse: 54   SpO2: 96%   Weight: 83.5 kg (184 lb)   Height: 182.9 cm (72.01\")         Physical Exam  Vitals and nursing note reviewed.   Constitutional:       Appearance: Normal appearance. He is normal weight.   Eyes:      Extraocular Movements: Extraocular movements intact.      Pupils: Pupils are equal, round, and reactive to light.   Cardiovascular:      Rate and Rhythm: Normal rate and regular rhythm.      Chest Wall: PMI is not displaced. No thrill.      Pulses: Normal pulses.      Heart sounds: Normal heart sounds.   Pulmonary:      Effort: Pulmonary effort is normal.      Breath sounds: Normal breath sounds.   Musculoskeletal:      Cervical back: Normal range of motion.      Right lower leg: No edema.      Left lower leg: No edema.   Skin:     General: Skin is warm and dry.   Neurological:      General: No focal deficit present.      Mental Status: He is alert and oriented to person, place, and time. Mental status is at baseline.   Psychiatric:         Mood and Affect: Mood normal.         Behavior: Behavior " normal.         Thought Content: Thought content normal.         Judgment: Judgment normal.               Lab Results   Component Value Date     01/11/2024     08/28/2022    K 5.0 01/11/2024    K 4.2 08/28/2022     01/11/2024     (H) 08/28/2022    CO2 25 01/11/2024    CO2 24.3 08/28/2022    BUN 9 01/11/2024    BUN 10 08/28/2022    CREATININE 0.98 01/11/2024    CREATININE 0.77 08/28/2022    EGFRIFNONA 84 01/17/2022    EGFRIFNONA 83 08/03/2021    EGFRIFAFRI 97 01/17/2022    GLUCOSE 91 01/11/2024    GLUCOSE 95 08/28/2022    CALCIUM 9.6 01/11/2024    CALCIUM 8.6 08/28/2022    ALBUMIN 4.4 01/11/2024    ALBUMIN 4.10 08/27/2022    BILITOT 0.3 01/11/2024    BILITOT 0.4 08/27/2022    AST 19 01/11/2024    AST 25 08/27/2022    ALT 17 01/11/2024    ALT 26 08/27/2022     Lab Results   Component Value Date    WBC 6.20 08/28/2022    WBC 7.68 08/27/2022    HGB 13.3 08/28/2022    HGB 13.7 08/27/2022    HCT 40.4 08/28/2022    HCT 40.3 08/27/2022    MCV 88.0 08/28/2022    MCV 86.5 08/27/2022     08/28/2022     08/27/2022     Lab Results   Component Value Date    CHOL 116 03/31/2023    CHOL 95 08/28/2022    TRIG 103 01/11/2024    TRIG 109 03/31/2023    HDL 39 (L) 01/11/2024    HDL 40 03/31/2023    LDL 67 01/11/2024    LDL 56 03/31/2023     Lab Results   Component Value Date    PROBNP 32.3 08/27/2022    PROBNP 263.2 09/14/2018     Lab Results   Component Value Date    TROPONINT <0.010 08/28/2022     Lab Results   Component Value Date    TSH 0.901 08/03/2021    TSH 1.170 09/14/2018           ECG 12 Lead    Date/Time: 4/22/2025 11:40 AM  Performed by: Edwin Sanchez APRN    Authorized by: Edwin Sanchez APRN  Comparison: compared with previous ECG from 9/25/2024  Rhythm: sinus rhythm  Rate: normal  BPM: 54  Conduction: conduction normal  ST Segments: ST segments normal  T Waves: T waves normal  Other: no other findings        TTE 09/30/2022:  Calculated left ventricular EF = 63.5% Estimated left  ventricular EF = 64% Estimated left ventricular EF was in agreement with the calculated left ventricular EF. Left ventricular systolic function is normal. Normal left ventricular cavity size and wall thickness noted. All left ventricular wall segments contract normally. Left ventricular diastolic function was normal.  No aortic valve regurgitation or stenosis is present. The aortic valve is abnormal in structure. There is mild calcification of the aortic valve.      Stress Perfusion Study 08/28/2022:  Findings consistent with a normal ECG stress test.  Left ventricular ejection fraction is normal. (Calculated EF = 60%).  Myocardial perfusion imaging indicates a normal myocardial perfusion study with no evidence of ischemia.  Impressions are consistent with a low risk study.     14 Day Holter 09/01/2021:  Patient diary submitted. Palpitations and dizziness were reported during the monitoring period. Patient reported frequent episodes of palpitations. Palpitations were associated with isolated PACs, PVCs, and junctional rhythm. Patient reported occasional episodes of dizziness. No complications noted. The predominant rhythm noted during the testing period was sinus rhythm. Premature atrial contractions occured rarely. There was no evidence of atrial arrhythmias. There were no episodes of supraventricular tachycardia. Premature ventricular contractions occured rarely. Ventricular couplets and bigeminy. There were no episodes of ventricular tachycardia. Sinoatrial node conduction was normal. No atrioventricular block noted. Episodes of junctional rhythm.      Stress Perfusion study 10/15/2020:  GI artifact is present.  Myocardial perfusion imaging indicates a normal myocardial perfusion study with no evidence of ischemia.  Left ventricular ejection fraction is normal. (Calculated EF = 55%).  Impressions are consistent with a low risk study.     48-hour Holter monitor 9/24/2018:  A relatively benign monitor study.       2D Echocardiogram 08/20/2018:  Left ventricular systolic function is normal. Estimated EF = 70%.  Mild tricuspid valve regurgitation is present.  Estimated right ventricular systolic pressure from tricuspid regurgitation is normal (<35 mmHg).     Left heart cath 8/20/2018:  1. Left main: Normal  2. LAD: Discrete 70-80% apical stenosis  3. LCX: 60% proximal OM1 stenosis  4. RCA: Acutely occluded in the midsegment  5.  Moderately depressed left ventricular systolic function.  Ejection fraction 30%.  Basal to mid inferior wall akinesis.  Mild anterolateral hypokinesis  6.  Successful PCI and aspiration thrombectomy of the RCA.  3.0 x 38 mm Xience Lilo drug-eluting stent deployed across the proximal to mid segment and postdilated in the proximal to mid segment with a 3.5 x 20 mm noncompliant trek balloon to high pressure      Assessment:         Diagnoses and all orders for this visit:    1. Status post insertion of drug-eluting stent into right coronary artery for coronary artery disease (Primary)  -     ECG 12 Lead    2. Coronary artery disease of native artery of native heart with stable angina pectoris  -     CBC (No Diff); Future    3. Essential hypertension    4. Hyperlipidemia, unspecified hyperlipidemia type    5. History of ST elevation myocardial infarction (STEMI)    6. Dyslipidemia  -     Comprehensive Metabolic Panel; Future  -     Lipid Panel; Future    7. History of tobacco abuse    Other orders  -     nitroglycerin (NITROSTAT) 0.4 MG SL tablet; 1 under the tongue as needed for angina, may repeat q5mins for up three doses  Dispense: 45 tablet; Refill: 11            Plan:       1.  Coronary artery disease:   --History of inferior MI in 2018 status post thrombectomy and CARRIE to RCA.  --No beta-blocker as patient with history of junctional rhythm.    --Stable EKG. Denies angina  -Continue aspirin 81 mg p.o. daily, Lipitor 40 mg p.o. nightly.      2.  Hypertension: Well-controlled.  Will repeat CMP     3.   Hyperlipidemia: Last lipid panel 1/11/2024.  LDL 67.  Continue atorvastatin current dose.  AST/ALT normal--Will repeat CMP and lipid profile     4.  H/o tobacco abuse: Stopped smoking after MI in 2018.  Stopped vaping as well. Has started smoking cigars. Encouraged smoking cessation.      5.  Junctional rhythm: Intermittent.  Not on beta-blockers. In sinus rhythm today      Follow-up with Dr. Leyva in 1 year.      VARINDER Melo  [facility]  04/22/25  12:30 EDT

## 2025-04-22 ENCOUNTER — OFFICE VISIT (OUTPATIENT)
Dept: CARDIOLOGY | Age: 70
End: 2025-04-22
Payer: MEDICARE

## 2025-04-22 VITALS
HEART RATE: 54 BPM | HEIGHT: 72 IN | OXYGEN SATURATION: 96 % | WEIGHT: 184 LBS | BODY MASS INDEX: 24.92 KG/M2 | DIASTOLIC BLOOD PRESSURE: 80 MMHG | SYSTOLIC BLOOD PRESSURE: 112 MMHG

## 2025-04-22 DIAGNOSIS — Z87.891 HISTORY OF TOBACCO ABUSE: ICD-10-CM

## 2025-04-22 DIAGNOSIS — E78.5 HYPERLIPIDEMIA, UNSPECIFIED HYPERLIPIDEMIA TYPE: ICD-10-CM

## 2025-04-22 DIAGNOSIS — I25.2 HISTORY OF ST ELEVATION MYOCARDIAL INFARCTION (STEMI): ICD-10-CM

## 2025-04-22 DIAGNOSIS — I10 ESSENTIAL HYPERTENSION: ICD-10-CM

## 2025-04-22 DIAGNOSIS — Z95.5 STATUS POST INSERTION OF DRUG-ELUTING STENT INTO RIGHT CORONARY ARTERY FOR CORONARY ARTERY DISEASE: Primary | ICD-10-CM

## 2025-04-22 DIAGNOSIS — I25.118 CORONARY ARTERY DISEASE OF NATIVE ARTERY OF NATIVE HEART WITH STABLE ANGINA PECTORIS: ICD-10-CM

## 2025-04-22 DIAGNOSIS — E78.5 DYSLIPIDEMIA: ICD-10-CM

## 2025-04-22 PROCEDURE — 3079F DIAST BP 80-89 MM HG: CPT

## 2025-04-22 PROCEDURE — 93000 ELECTROCARDIOGRAM COMPLETE: CPT

## 2025-04-22 PROCEDURE — 3074F SYST BP LT 130 MM HG: CPT

## 2025-04-22 PROCEDURE — 1159F MED LIST DOCD IN RCRD: CPT

## 2025-04-22 PROCEDURE — 1160F RVW MEDS BY RX/DR IN RCRD: CPT

## 2025-04-22 PROCEDURE — 99214 OFFICE O/P EST MOD 30 MIN: CPT

## 2025-04-22 RX ORDER — NITROGLYCERIN 0.4 MG/1
TABLET SUBLINGUAL
Qty: 45 TABLET | Refills: 11 | Status: SHIPPED | OUTPATIENT
Start: 2025-04-22

## 2025-06-24 DIAGNOSIS — N40.1 BENIGN PROSTATIC HYPERPLASIA WITH LOWER URINARY TRACT SYMPTOMS, SYMPTOM DETAILS UNSPECIFIED: ICD-10-CM

## 2025-06-24 RX ORDER — TAMSULOSIN HYDROCHLORIDE 0.4 MG/1
1 CAPSULE ORAL DAILY
Qty: 90 CAPSULE | Refills: 1 | Status: SHIPPED | OUTPATIENT
Start: 2025-06-24

## 2025-06-24 RX ORDER — TAMSULOSIN HYDROCHLORIDE 0.4 MG/1
1 CAPSULE ORAL DAILY
Qty: 90 CAPSULE | Refills: 0 | OUTPATIENT
Start: 2025-06-24

## 2025-06-24 NOTE — TELEPHONE ENCOUNTER
LOV                  12/4/24  NOV                 no fu  Last refill       3/24/25       Protocol        met   Needs appt

## (undated) DEVICE — BND PRESS RADL COMFRT 14IN STRL

## (undated) DEVICE — NC TREK CORONARY DILATATION CATHETER 3.5 MM X 20 MM / RAPID-EXCHANGE: Brand: NC TREK

## (undated) DEVICE — DEV INDEFLATOR

## (undated) DEVICE — 6F .070 JR 4 100CM: Brand: CORDIS

## (undated) DEVICE — CATH DIAG IMPULSE PIG 5F 100CM

## (undated) DEVICE — PK CATH CARD 40

## (undated) DEVICE — TREK CORONARY DILATATION CATHETER 3.0 MM X 15 MM / RAPID-EXCHANGE: Brand: TREK

## (undated) DEVICE — GLIDESHEATH BASIC HYDROPHILIC COATED INTRODUCER SHEATH: Brand: GLIDESHEATH

## (undated) DEVICE — CATH ASPIR EXPORT AP .014IN 6F140CM

## (undated) DEVICE — GW EMR FIX EXCHG J STD .035 3MM 260CM

## (undated) DEVICE — HI-TORQUE VERSATURN F GUIDE WIRE FULLY COATED .014 STRAIGHT TIP 190 CM: Brand: HI-TORQUE VERSATURN

## (undated) DEVICE — CATH DIAG IMPULSE FL3.5 5F 100CM

## (undated) DEVICE — KT MANIFLD CARDIAC